# Patient Record
Sex: MALE | Race: WHITE | NOT HISPANIC OR LATINO | Employment: OTHER | URBAN - METROPOLITAN AREA
[De-identification: names, ages, dates, MRNs, and addresses within clinical notes are randomized per-mention and may not be internally consistent; named-entity substitution may affect disease eponyms.]

---

## 2024-01-03 ENCOUNTER — TRANSFERRED RECORDS (OUTPATIENT)
Dept: HEALTH INFORMATION MANAGEMENT | Facility: CLINIC | Age: 64
End: 2024-01-03

## 2024-02-05 ENCOUNTER — TRANSFERRED RECORDS (OUTPATIENT)
Dept: HEALTH INFORMATION MANAGEMENT | Facility: CLINIC | Age: 64
End: 2024-02-05

## 2024-02-13 ENCOUNTER — TRANSCRIBE ORDERS (OUTPATIENT)
Dept: OTHER | Age: 64
End: 2024-02-13

## 2024-02-13 DIAGNOSIS — D47.9 LYMPHOPROLIFERATIVE DISORDER (H): Primary | ICD-10-CM

## 2024-02-14 ENCOUNTER — PATIENT OUTREACH (OUTPATIENT)
Dept: ONCOLOGY | Facility: CLINIC | Age: 64
End: 2024-02-14
Payer: COMMERCIAL

## 2024-02-14 NOTE — PROGRESS NOTES
[facility]: Cancer Care                                                                   Hem/Onc  Referral reviewed   Adult Oncology/Hematology  Referral [[id number]]    Transcribed from faxed referral  by: Severson, Tammie, LPN on 02/14/24 0754     Referral Details    Referred By  Referred To   Rush Johnson MD   [address] [place] [address] [place] [address]   Phone: [phone]   Fax: [phone]    Diagnoses: Lymphoproliferative disorder (H)   Order: Adult Oncology/Hematology  Referral    [facility] / [facility]      Comment: Faxed referral received from Rush Johnson MD at [facility]   Phone: [phone]   Fax: [phone]        Order Questions    Question Answer   My Clinical Question Is: Lymphoproliferative disorder   Reason for Referral: Hematology   Hematology type: Malignant     ASSESSMENT      Clinical History (per Nurse review of records provided):    63 year old male patient referred as above, with skin bx and bmbx + for MF    Lives in [facility]      Records Location: [facility]   Pertinent labs -- BOOKMARKED  Pertinent pathology report(s) -- BOOKMARKED  Pertinent imaging -- BOOKMARKED  Referring provider note(s)-- BOOKMARKED    INTERVENTION(S)                                                      February 14, 2024  OUTGOING CALL to pt, no answer. Left voicemail introducing my role as nurse navigator with [facility] hematology/oncology clinics and that we have recd the referral  . Requested callback to number below (Mon - Fri 8am - 4:30pm) to speak to a  to set the consult date/time/location. Explained to pt that he will also receive a call from our scheduling intake team in the next 1-2 business days     -Referral Triage Scheduling Instructions added to Hem/Onc  referral for Patient Access rep    PLAN                                                       Hem/Onc consult at [facility]     Records Needed: YES : imaging reports and images, derm path and bmbx path slides for consult at [facility] hempath     See records team's Pre-Visit encounter documentation for additional records retrieval information.    Liyah Mccann, RN, BSN, OCN  Hematology/Oncology New Patient Nurse Navigator   [facility] [place]  [phone]

## 2024-02-16 NOTE — TELEPHONE ENCOUNTER
466  RECORDS STATUS - ALL OTHER DIAGNOSIS      Action February 16, 2024 12:20 PM    Action Taken - Request and label is faxed to [facility] Path and [facility] for slides to be sent to David at [facility].  - Request is faxed to [facility] and [facility] Film rooms to push images to  PACS.      Imaging Received  February 19, 2024 9:06 AM    Action: Images from [facility] received and resolved to PACS.     RECORDS RECEIVED FROM: [facility]   DATE RECEIVED:    NOTES STATUS DETAILS   OFFICE NOTE from referring provider [facility] 1/30/24: Dr. Rush Johnson   OFFICE NOTE from other specialist [facility] 1/3/24: BEAN Prajapati CNP   OPERATIVE REPORT [facility] 2/5/24: BMB  1/3/24: Punch Skin Bx   MEDICATION LIST [facility]    LABS     PATHOLOGY REPORTS Report in [facility]  (Slides Requested)  [facility] Fed Tracking #: [id number]  [facility] [employer] Tracking #: [id number] 2/5/24: [id number] (positive)  1/3/24: [id number] (positive)   ANYTHING RELATED TO DIAGNOSIS [facility] Most recent 1/30/24   IMAGING (NEED IMAGES & REPORT)     CT SCANS (Img req from [facility]) [facility]:  2/5/24: CT Bone Marrow Bx  2/1/24: CT Chest Abd Pel    [place]:  1/11/24: CT Head  1/11/24: CTA Chest

## 2024-02-19 PROBLEM — C84.09: Status: ACTIVE | Noted: 2024-02-19

## 2024-02-19 NOTE — PROGRESS NOTES
Virtual Visit Details    Type of service:  Video Visit     Originating Location (pt. 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  To summarize his course, he had a skin rash involving the arms that waxed an waned since about 2011.  It progressed to involve his torso.  Rash was raised, red/purple in color, and intermittently itched.  There was limited benefit from topical creams.  Skin biopsy in early 2021 suggested interface dermatitis.  Skin biopsy in 07/2021 had atypical lymphoid infiltrates but with no specific histologic diagnosis.  Skin biopsy in 01/2024 was felt to be most consistent with MF.  Bone marrow biopsy in 02/2024 had no obvious bone marrow involvement but peripheral blood flow cytometry showed about 0.5 x 10^9/L clonal T cells (Sezary cells).  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  He had no fever, night sweats, or unintentional weight loss.  He has received NBUVB treatment in the past with some improvement in itching and some relief from topical steroids but not durable.  Visit for recommendations regarding management of MF vs SS.    He is with wife Irma.  Energy level has been a bit lower - still working but not as much.  Some dyspnea since having influenza about a month ago.  Diffuse rash has been more itchy and bothersome recently.  No fevers, night sweats, or unintentional weight loss.  No headache or focal weakness or sensory changes.  Normal bowel function.  No urinary complaints.  No bleeding or unusual bruising.  No new lumps or bumps.   "Interested for recommendations regarding lymphoma.    PAST MEDICAL HISTORY:  Prothrombin gene mutation, hyperlipidemia    MEDICATIONS:  Current Outpatient Medications   Medication    clobetasol propionate (CLOBEX) 0.05 % external shampoo    pseudoePHEDrine (SUDAFED) 30 MG tablet    triamcinolone (KENALOG) 0.1 % external cream     No current facility-administered medications for this visit.     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  HEENT: Sclerae anicteric.  Lungs: Breathing comfortably. No cough.  MSK: Grossly normal movement.  Neuro: Grossly non-focal.  Skin/access: Visible skin erythematous, no visible ulceration or plaques/tumors, sparing around the eyes  Psych: Alert and oriented. No distress.  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    We reviewed his course as outlined above and the general features, natural history, and management of MF and SS.  He has pretty extensive skin involvement in the 80+% range with intermediate blood involvement (about 0.5 x 10^9/L involvement by Sezary cells) and no obvious visceral or lymph node involvement - that seems most consistent with clinical " stage IIIB MF.  We discussed that outcomes can vary widely for MF/SS and that survival for advanced stage disease has historically been in the 5-10 year range but that there is a large range with some going much better and some not as good and there are an increasing number of options for treatment.  We also discussed that standard treatments are generally not curative short of alloBMT and that continued treatment of one type or another is typically required for the best outcome.  There has been an inadequate response to skin directed therapy, I would tend to favor some type of systemic therapy with continued skin-directed treatment - possibly extracorporeal photopheresis (ECP).    I recommended a visit with our Derm Lymphoma team ASAP for their input on skin-directed treatment and consideration of ECP.  Based on the blood involvement, ECP may be a reasonable place to start if the Derm Lymphoma Team agrees to avoid the side effects and potential toxicity of other systemic treatment options.  If ECP is not favored by the Derm Lymphoma Team or not adequately effective or feasible, then systemic therapy with a systemic retinoid such as bexarotene or interferon would be reasonable choices +/- ECP.  Bexarotene may be a good choice to avoid common side effects of interferon treatment.    Once he is seen by our Derm Lymphoma Team we can discuss treatment options further including his local oncologist Dr. Rush Johnson to figure out logistics of treatment and follow-up.  I mentioned other resources our clinic can provide including Cancer Psychology, Social Work, PT, dietician, etc.  They agree with the plan.    He has no active ID issues.  I recommended Prevnar 20, the Shingrix vaccine series, a flu shot, RSV vaccine, and an updated COVID-19 vaccine if he has not received these.  He declined vaccines.    He will continue to work with his primary care clinic for health maintenance and other medical issues.  We will keep them  in the loop.    We will request a Derm Lymphoma visit ASAP and arrange additional follow-up after that visit.  I provided contact information for our clinic and asked them to contact us if questions, concerns, or new issues come up between visits.    Video start time: 12:53 PM  Video end time: 1:52 PM  Video duration: 59 minutes    Total of 85 minutes on patient visit, reviewing records, interpreting test results, placing orders, and documentation on the day of service.  Plus and additional 60 minutes reviewing records in preparation for the visit prior to date of service.    Roberto Everett MD, PhD  Attending Physician, [facility]  [phone] [facility]

## 2024-02-20 ENCOUNTER — LAB (OUTPATIENT)
Dept: LAB | Facility: CLINIC | Age: 64
End: 2024-02-20
Attending: INTERNAL MEDICINE
Payer: COMMERCIAL

## 2024-02-20 ENCOUNTER — PRE VISIT (OUTPATIENT)
Dept: ONCOLOGY | Facility: CLINIC | Age: 64
End: 2024-02-20
Payer: COMMERCIAL

## 2024-02-20 ENCOUNTER — VIRTUAL VISIT (OUTPATIENT)
Dept: ONCOLOGY | Facility: CLINIC | Age: 64
End: 2024-02-20
Attending: INTERNAL MEDICINE
Payer: COMMERCIAL

## 2024-02-20 VITALS — WEIGHT: 165 LBS | BODY MASS INDEX: 27.49 KG/M2 | HEIGHT: 65 IN

## 2024-02-20 DIAGNOSIS — C84.09 MYCOSIS FUNGOIDES INVOLVING EXTRANODAL SITE EXCLUDING SPLEEN AND OTHER SOLID ORGANS (H): Primary | ICD-10-CM

## 2024-02-20 DIAGNOSIS — D59.4 HEMOLYTIC ANEMIA ASSOCIATED WITH LYMPHOPROLIFERATIVE DISORDER (H): Primary | ICD-10-CM

## 2024-02-20 DIAGNOSIS — D47.9 HEMOLYTIC ANEMIA ASSOCIATED WITH LYMPHOPROLIFERATIVE DISORDER (H): Primary | ICD-10-CM

## 2024-02-20 PROCEDURE — 88321 CONSLTJ&REPRT SLD PREP ELSWR: CPT | Mod: GC | Performed by: PATHOLOGY

## 2024-02-20 PROCEDURE — 99205 OFFICE O/P NEW HI 60 MIN: CPT | Mod: 95 | Performed by: INTERNAL MEDICINE

## 2024-02-20 RX ORDER — TRIAMCINOLONE ACETONIDE 1 MG/G
CREAM TOPICAL
COMMUNITY

## 2024-02-20 RX ORDER — PSEUDOEPHEDRINE HCL 30 MG
30 TABLET ORAL
COMMUNITY

## 2024-02-20 RX ORDER — CLOBETASOL PROPIONATE 0.05 G/100ML
SHAMPOO TOPICAL
COMMUNITY

## 2024-02-20 NOTE — LETTER
"    2/20/2024         RE: Blane Ugalde  [address] [facility] [address]        Dear Colleague,    Thank you for referring your patient, Blane Ugalde, to the [facility]. Please see a copy of my visit note below.    Virtual Visit Details    Type of service:  Video Visit   Video Start Time: {video visit start/end time for provider to select:065661}  Video End Time:{video visit start/end time for provider to select:606723}    Originating Location (pt. Location): {video visit patient location:026921::\"Home\"}  {PROVIDER LOCATION On-site should be selected for visits conducted from your clinic location or adjoining [facility] hospital, academic office, or other nearby [facility] building. 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  To summarize his course, he had a skin rash involving the arms that waxed an waned since about 2011.  It progressed to involve his torso.  Rash was raised, red/purple in color, and intermittently itched.  There was limited benefit from topical creams.  Skin biopsy in early 2021 suggested interface dermatitis.  Skin biopsy in 07/2021 had atypical lymphoid infiltrates but with no specific histologic diagnosis.  Skin biopsy in 01/2024 was felt to be most consistent with MF.  Bone marrow biopsy in 02/2024 had no obvious bone marrow involvement but peripheral blood flow cytometry showed about 0.5 x " 10^9/L clonal T cells.  Overall, consistent with MF - clinical stage IIIB - with blood involvement approaching clinical stage IVA1 SS.  He had no fever, night sweats, or unintentional weight loss.  He has received NBUVB treatment with some improvement in itching.  Visit for recommendations regarding management of MF vs SS.    He is with ***.  Energy level is OK.  Diffuse rash has been more itchy and bothersome recently.  No fevers, night sweats, or unintentional weight loss.  No headache or focal weakness or sensory changes.  No dyspnea, cough, or chest pain.  Normal bowel function.  No urinary complaints.  No bleeding or unusual bruising.  No new lumps or bumps.  ***    PAST MEDICAL HISTORY:  Prothrombin gene mutation, hyperlipidemia    MEDICATIONS:  No current outpatient medications on file.     No current facility-administered medications for this visit.     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  Wt Readings from Last 5 Encounters:   No data found for Wt     General: Well appearing.  HEENT: Sclerae anicteric.  Lungs: Breathing comfortably. No cough.  MSK: Grossly normal movement.  Neuro: Grossly non-focal.  Skin/access: ***  Psych: Alert and oriented. No distress.  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    We reviewed his course as outlined above and the general features, natural history, and management of MF and SS.  He has pretty extensive skin involvement in the 80+% range with intermediate blood involvement (about 0.5 x 10^9/L involvement by Sezary cells) and no obvious visceral or lymph node involvement - that seems most consistent with clinical stage IIIB MF.  We discussed that outcomes can vary widely for MF/SS and that survival for advanced stage disease has historically been in the 5-10 year range but that there is a large range with some going much better and some not as good and there are an increasing number of options for treatment.  We also discussed that standard treatments are generally not curative short of alloBMT and that continued treatment of one type or another is typically required for the best outcome.  There has been an inadequate response to skin directed therapy, I would tend to favor some type of systemic therapy with continued skin-directed treatment - possibly extracorporeal photopheresis (ECP).    I would recommend a visit with our Derm Lymphoma team ASAP for their input on skin-directed treatment and consideration of ECP.  Based on the blood involvement ECP may be a reasonable place to start if the Derm Lymphoma Team agrees to avoid the side effects and potential toxicity of other systemic treatment options.  If ECP is not favored by the Derm Lymphoma Team or not adequately effective or feasible, then systemic therapy with a systemic retinoid such as bexarotene or interferon would be reasonable choices +/- ECP.  Bexarotene may be a good choice to avoid common side effects of interferon treatment.    Once he is seen by our Derm Lymphoma Team we can discuss treatment options further including his local oncologist Dr. Rush Johnson to figure out logistics of treatment and follow-up.  He agrees with the plan.    He has no active ID issues.  I recommended Prevnar 20, the Shingrix  vaccine series, a flu shot, RSV vaccine, and an updated COVID-19 vaccine if he has not received these.    He will continue to work with his primary care clinic for health maintenance and other medical issues.  We will keep them in the loop.    We will request a Derm Lymphoma visit ASA and arrange additional follow-up after that visit.  I provided contact information for our clinic and asked them to contact us if questions, concerns, or new issues come up between visits.    Video start time: ***  Video end time: ***  Video duration: *** minutes    Total of *** minutes on patient visit, reviewing records, interpreting test results, placing orders, and documentation on the day of service.  Plus and additional 60 minutes reviewing records in preparation for the visit prior to date of service.        Derm Lymphoma visit as scheduled  Further follow-up will be determined after that visit                      Virtual Visit Details    Type of service:  Video Visit     Originating Location (pt. Location): Home  {PROVIDER LOCATION On-site should be selected for visits conducted from your clinic location or adjoining [facility] hospital, academic office, or other nearby [facility] building. 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  To summarize his course, he had a skin rash involving the arms that waxed an waned since about 2011.  It progressed to involve his torso.  Rash was raised, red/purple in color, and intermittently itched.  There was limited benefit from topical creams.  Skin biopsy in  "early 2021 suggested interface dermatitis.  Skin biopsy in 07/2021 had atypical lymphoid infiltrates but with no specific histologic diagnosis.  Skin biopsy in 01/2024 was felt to be most consistent with MF.  Bone marrow biopsy in 02/2024 had no obvious bone marrow involvement but peripheral blood flow cytometry showed about 0.5 x 10^9/L clonal T cells (Sezary cells).  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  He had no fever, night sweats, or unintentional weight loss.  He has received NBUVB treatment in the past with some improvement in itching and some relief from topical steroids but not durable.  Visit for recommendations regarding management of MF vs SS.    He is with wife Irma.  Energy level has been a bit lower - still working but not as much.  Some dyspnea since having influenza about a month ago.  Diffuse rash has been more itchy and bothersome recently.  No fevers, night sweats, or unintentional weight loss.  No headache or focal weakness or sensory changes.  Normal bowel function.  No urinary complaints.  No bleeding or unusual bruising.  No new lumps or bumps.  Interested for recommendations regarding lymphoma.    PAST MEDICAL HISTORY:  Prothrombin gene mutation, hyperlipidemia    MEDICATIONS:  Current Outpatient Medications   Medication    clobetasol propionate (CLOBEX) 0.05 % external shampoo    pseudoePHEDrine (SUDAFED) 30 MG tablet    triamcinolone (KENALOG) 0.1 % external cream     No current facility-administered medications for this visit.     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  HEENT: Sclerae anicteric.  Lungs: Breathing comfortably. No cough.  MSK: Grossly normal movement.  Neuro: Grossly non-focal.  Skin/access: Visible skin erythematous, no visible ulceration or plaques/tumors, sparing around the eyes  Psych: Alert and oriented. No distress.  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    We reviewed his course as outlined above and the general features, natural history, and management of MF and SS.  He has pretty extensive skin involvement in the 80+% range with intermediate blood involvement (about 0.5 x 10^9/L involvement by Sezary cells) and no obvious visceral or lymph node involvement - that seems most consistent with clinical stage IIIB MF.  We discussed that outcomes can vary widely for MF/SS and that survival for advanced stage disease has historically been in the 5-10 year range but that there is a large range with some going much better and some not as good and there are an increasing number of options for treatment.  We also discussed that standard treatments are generally not curative short of alloBMT and that continued treatment of one type or another is typically required for the best outcome.  There has been an inadequate response to skin directed therapy, I would tend to favor some type of systemic therapy with continued skin-directed treatment - possibly extracorporeal photopheresis (ECP).    I recommended a visit with our Derm Lymphoma team ASAP for their input on  skin-directed treatment and consideration of ECP.  Based on the blood involvement, ECP may be a reasonable place to start if the Derm Lymphoma Team agrees to avoid the side effects and potential toxicity of other systemic treatment options.  If ECP is not favored by the Derm Lymphoma Team or not adequately effective or feasible, then systemic therapy with a systemic retinoid such as bexarotene or interferon would be reasonable choices +/- ECP.  Bexarotene may be a good choice to avoid common side effects of interferon treatment.    Once he is seen by our Derm Lymphoma Team we can discuss treatment options further including his local oncologist Dr. Rush Johnson to figure out logistics of treatment and follow-up.  I mentioned other resources our clinic can provide including Cancer Psychology, Social Work, PT, dietician, etc.  They agree with the plan.    He has no active ID issues.  I recommended Prevnar 20, the Shingrix vaccine series, a flu shot, RSV vaccine, and an updated COVID-19 vaccine if he has not received these.  He declined vaccines.    He will continue to work with his primary care clinic for health maintenance and other medical issues.  We will keep them in the loop.    We will request a Derm Lymphoma visit ASAP and arrange additional follow-up after that visit.  I provided contact information for our clinic and asked them to contact us if questions, concerns, or new issues come up between visits.    Video start time: 12:53 PM  Video end time: 1:52 PM  Video duration: 59 minutes    Total of *** minutes on patient visit, reviewing records, interpreting test results, placing orders, and documentation on the day of service.  Plus and additional 60 minutes reviewing records in preparation for the visit prior to date of service.        Derm Lymphoma visit as scheduled  Further follow-up will be determined after that visit                       Again, thank you for allowing me to participate in the care of your  patient.        Sincerely,        Roberto Everett MD

## 2024-02-20 NOTE — NURSING NOTE
Is the patient currently in the state of [place]? YES    Visit mode:VIDEO    If the visit is dropped, the patient can be reconnected by: TELEPHONE VISIT: Phone number: [phone]    Will anyone else be joining the visit? Yes,spouse Irma is there with.   (If patient encounters technical issues they should call [phone])    How would you like to obtain your AVS? MyChart    Are changes needed to the allergy or medication list? Yes Omeprazole 10mg as needed     Reason for visit: Consult    Stormy HARVEY

## 2024-02-20 NOTE — LETTER
2/20/2024       RE: Blane Ugalde  [address] [facility] [address]     Dear Colleague,    Thank you for referring your patient, Blane Ugalde, to the [facility]. Please see a copy of my visit note below.    Virtual Visit Details    Type of service:  Video Visit     Originating Location (pt. 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  To summarize his course, he had a skin rash involving the arms that waxed an waned since about 2011.  It progressed to involve his torso.  Rash was raised, red/purple in color, and intermittently itched.  There was limited benefit from topical creams.  Skin biopsy in early 2021 suggested interface dermatitis.  Skin biopsy in 07/2021 had atypical lymphoid infiltrates but with no specific histologic diagnosis.  Skin biopsy in 01/2024 was felt to be most consistent with MF.  Bone marrow biopsy in 02/2024 had no obvious bone marrow involvement but peripheral blood flow cytometry showed about 0.5 x 10^9/L clonal T cells (Sezary cells).  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  He had no fever, night sweats, or unintentional weight loss.  He has received NBUVB treatment in the past with some improvement in itching and some relief from topical steroids but not durable.  Visit for recommendations regarding management of MF vs SS.    He is with wife Irma.  Energy level has been a bit lower - still working but not as much.  Some dyspnea since having influenza about a month ago.  Diffuse rash has been more itchy and  "bothersome recently.  No fevers, night sweats, or unintentional weight loss.  No headache or focal weakness or sensory changes.  Normal bowel function.  No urinary complaints.  No bleeding or unusual bruising.  No new lumps or bumps.  Interested for recommendations regarding lymphoma.    PAST MEDICAL HISTORY:  Prothrombin gene mutation, hyperlipidemia    MEDICATIONS:  Current Outpatient Medications   Medication     clobetasol propionate (CLOBEX) 0.05 % external shampoo     pseudoePHEDrine (SUDAFED) 30 MG tablet     triamcinolone (KENALOG) 0.1 % external cream     No current facility-administered medications for this visit.     FAMILY HISTORY:  Family history of pancreatic and stomach cancer, no known blood cancers, several men with blood clots on father's side    SOCIAL HISTORY:  never smoker, , 3 kids, contractor, lives in [place], [place]    PHYSICAL EXAMINATION:  Ht 1.651 m (5' 5\")   Wt 74.8 kg (165 lb)   BMI 27.46 kg/m    Wt Readings from Last 5 Encounters:   02/20/24 74.8 kg (165 lb)     General: Well appearing.  HEENT: Sclerae anicteric.  Lungs: Breathing comfortably. No cough.  MSK: Grossly normal movement.  Neuro: Grossly non-focal.  Skin/access: Visible skin erythematous, no visible ulceration or plaques/tumors, sparing around the eyes  Psych: Alert and oriented. No distress.  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    We reviewed his course as outlined above and the general features, natural history, and management of MF and SS.  " He has pretty extensive skin involvement in the 80+% range with intermediate blood involvement (about 0.5 x 10^9/L involvement by Sezary cells) and no obvious visceral or lymph node involvement - that seems most consistent with clinical stage IIIB MF.  We discussed that outcomes can vary widely for MF/SS and that survival for advanced stage disease has historically been in the 5-10 year range but that there is a large range with some going much better and some not as good and there are an increasing number of options for treatment.  We also discussed that standard treatments are generally not curative short of alloBMT and that continued treatment of one type or another is typically required for the best outcome.  There has been an inadequate response to skin directed therapy, I would tend to favor some type of systemic therapy with continued skin-directed treatment - possibly extracorporeal photopheresis (ECP).    I recommended a visit with our Derm Lymphoma team ASAP for their input on skin-directed treatment and consideration of ECP.  Based on the blood involvement, ECP may be a reasonable place to start if the Derm Lymphoma Team agrees to avoid the side effects and potential toxicity of other systemic treatment options.  If ECP is not favored by the Derm Lymphoma Team or not adequately effective or feasible, then systemic therapy with a systemic retinoid such as bexarotene or interferon would be reasonable choices +/- ECP.  Bexarotene may be a good choice to avoid common side effects of interferon treatment.    Once he is seen by our Derm Lymphoma Team we can discuss treatment options further including his local oncologist Dr. Rush Johnson to figure out logistics of treatment and follow-up.  I mentioned other resources our clinic can provide including Cancer Psychology, Social Work, PT, dietician, etc.  They agree with the plan.    He has no active ID issues.  I recommended Prevnar 20, the Shingrix vaccine  series, a flu shot, RSV vaccine, and an updated COVID-19 vaccine if he has not received these.  He declined vaccines.    He will continue to work with his primary care clinic for health maintenance and other medical issues.  We will keep them in the loop.    We will request a Derm Lymphoma visit ASA and arrange additional follow-up after that visit.  I provided contact information for our clinic and asked them to contact us if questions, concerns, or new issues come up between visits.    Video start time: 12:53 PM  Video end time: 1:52 PM  Video duration: 59 minutes    Total of 85 minutes on patient visit, reviewing records, interpreting test results, placing orders, and documentation on the day of service.  Plus and additional 60 minutes reviewing records in preparation for the visit prior to date of service.    Roberto Everett MD, PhD  Attending Physician, [facility]  [phone] clinic      Again, thank you for allowing me to participate in the care of your patient.      Sincerely,    Roberto Everett MD

## 2024-02-22 ENCOUNTER — PATIENT OUTREACH (OUTPATIENT)
Dept: ONCOLOGY | Facility: CLINIC | Age: 64
End: 2024-02-22
Payer: COMMERCIAL

## 2024-02-22 LAB
PATH REPORT.COMMENTS IMP SPEC: ABNORMAL
PATH REPORT.COMMENTS IMP SPEC: YES
PATH REPORT.FINAL DX SPEC: ABNORMAL
PATH REPORT.GROSS SPEC: ABNORMAL
PATH REPORT.MICROSCOPIC SPEC OTHER STN: ABNORMAL
PATH REPORT.RELEVANT HX SPEC: ABNORMAL
PATH REPORT.RELEVANT HX SPEC: ABNORMAL
PATH REPORT.SITE OF ORIGIN SPEC: ABNORMAL

## 2024-02-22 NOTE — PROGRESS NOTES
[facility]: Cancer Care                                                                                          New pt to clinic on Tuesday.  Chart reviewed and enrollment status updated.  My chart message sent to pt to introduce self and role as RNCC.  Contact information for clinic provided.  Pt has appropriate follow up scheduled.    JACQUELINE Valderrama, RN  RN Care Coordinator  [facility]

## 2024-02-27 ENCOUNTER — OFFICE VISIT (OUTPATIENT)
Dept: DERMATOLOGY | Facility: CLINIC | Age: 64
End: 2024-02-27
Payer: COMMERCIAL

## 2024-02-27 DIAGNOSIS — C84.09 MYCOSIS FUNGOIDES INVOLVING EXTRANODAL SITE EXCLUDING SPLEEN AND OTHER SOLID ORGANS (H): ICD-10-CM

## 2024-02-27 DIAGNOSIS — Z79.899 ENCOUNTER FOR LONG-TERM (CURRENT) USE OF HIGH-RISK MEDICATION: Primary | ICD-10-CM

## 2024-02-27 PROCEDURE — 99205 OFFICE O/P NEW HI 60 MIN: CPT | Performed by: STUDENT IN AN ORGANIZED HEALTH CARE EDUCATION/TRAINING PROGRAM

## 2024-02-27 RX ORDER — OMEPRAZOLE 10 MG/1
20 CAPSULE, DELAYED RELEASE ORAL DAILY
COMMUNITY

## 2024-02-27 RX ORDER — METHOTREXATE 2.5 MG/1
25 TABLET ORAL
Qty: 40 TABLET | Refills: 3 | Status: SHIPPED | OUTPATIENT
Start: 2024-02-27 | End: 2024-06-24

## 2024-02-27 RX ORDER — FOLIC ACID 1 MG/1
1 TABLET ORAL DAILY
Qty: 26 TABLET | Refills: 11 | Status: SHIPPED | OUTPATIENT
Start: 2024-02-27

## 2024-02-27 NOTE — NURSING NOTE
Dermatology Rooming Note    Blane Ugalde's goals for this visit include:   Chief Complaint   Patient presents with    Autoimmune Disease     CTCL; discoloration and rashes all over body. Pt also states has constant pruritus.       Anthony Cano, EMEKA-B

## 2024-02-27 NOTE — PROGRESS NOTES
[facility] Dermatology Note    Encounter Date: Feb 27, 2024    Dermatology Problem List:  #MF stage IIIB  -Erythrodermic, flow with 500 Sezary cells, CT scan with 9 mm right axillary lymph node    CT scan 02/01/24  IMPRESSION:   1. Prominent, rounded but not pathologically enlarged RIGHT axillary lymph nodes indeterminate for lymphomatous involvement.   2.  No additional pathologically enlarged lymph nodes within the chest, abdomen or pelvis.   3.  Hepatic steatosis.   4.  Multiple hepatic cysts with additional hypodensities, too small to characterize.   5.  Bilateral renal cyst with additional hypodensities, too small to characterize.   6.  3-4 mm RIGHT lung pulmonary nodules, indeterminate.   7.  Small hiatal hernia.   8.  Enlarged main pulmonary artery can be seen with pulmonary arterial hypertension.   9.  Enlarged, heterogenous prostate.     -Treatment considerations include distance traveled, 300 miles from the [place]  - 02/27/2024 discussed treatment options plan to proceed with ECP in agreement with oncologist, start methotrexate 25 mg weekly with folic acid, assess response in 3 months keeping in mind that ECP has maximum results sometimes up to 6 months.  Continue home light boost therapy, not titrating up dose recommended finding the instruction manual for his light unit and titrating up as directed. Start bleach baths    ______________________________________    Impression/Plan:  Blane was seen today for autoimmune disease.    Diagnoses and all orders for this visit:    Encounter for long-term (current) use of high-risk medication  -     CBC with platelets differential; Standing  -     Comprehensive metabolic panel; Standing    Mycosis fungoides involving extranodal site excluding spleen and other solid organs (H) (chronic illness that poses risk to life, medication requiring intensive monitoring for toxicity)  -     Adult Dermatology  Referral  -     methotrexate 2.5 MG  tablet; Take 10 tablets (25 mg) by mouth every 7 days . Take the same day of the week.  -     folic acid (FOLVITE) 1 MG tablet; Take 1 tablet (1 mg) by mouth daily , except on days that you take methotrexate  -     Photopheresis Referral  -Recently met with oncologist  we discussed the variable disease course, difficult to predict prognosis, and the various treatment options available at his stage of disease  - Agree that extracorporeal photopheresis would be an excellent choice given his extensive skin involvement, mild blood involvement, and its low to minimal side effect profile.  The only issue with this treatment is that he lives about 300 miles away and treatments would at the very least starting out to be once monthly for 2 consecutive days.  Discussing this with him, his wife, and his son in the room they think that this is definitely doable and they are interested in proceeding with this treatment  - In addition methotrexate would be a good option given its relatively low side effect proper profile, good response rate and a wide range of disease stages, start 25 mg weekly, given instructions on lab monitoring frequency and use of folic acid  -Recheck in 3 months, would repeat flow cytometry at that time as well, if not improved we can consider switching to bexarotene, adding interferon, or referral for brentuximab  --Start bleach baths to help lower levels of any colonizing staph bacteria which may be present.  Exotoxins produced by staph bacteria have been shown experimentally to directly stimulate neoplastic T cells as well as stimulate nonneoplastic T cells to release growth signals which are consumed/used by the neoplastic T cells    Other orders  -     Adult Dermatology Clinic Follow-Up Order (Blank); Future        Follow-up in 3 mo .       Staff Involved:  Staff Only    Ezra Rico MD   of Dermatology  Department of Dermatology  [facility]   Medicine      CC:   Chief Complaint   Patient presents with    Autoimmune Disease     CTCL; discoloration and rashes all over body. Pt also states has constant pruritus.         History of Present Illness:  Mr. Blane Ugalde is a 63 year old male who presents as a new patient.    Pt presents today for evaluation of lonstanding waxing and waning pruritic rash ongoing for >10 years. Treated initially as eczema but was unresponsive to topical steroids. Has had multiple biopsies some in 2021 showing atypical lymphoid infiltrates but without definitive features of CTCL> RE biopsy more recently in January was c/w MF. Work up including BM bx, and flow cytometry showed no BM involvement but with 500 sezary cells. He is erythrodermic putting him at stage IIIB.     He recently had a virtual visit with Dr. Everett in Oncology on 02/20. They had an extensive discussion on the typical course of the disease and treatment options including ECP     Is doing nbUVB 3x weekly,.     Labs:      Physical exam:  Vitals: There were no vitals taken for this visit.  GEN: well developed, well-nourished, in no acute distress, in a pleasant mood.     SKIN: Beasley phototype 1  - Full skin, which includes the head/face, both arms, chest, back, abdomen,both legs, genitalia and/or groin buttocks, digits and/or nails, was examined.  -Around 80% body surface area with erythematous desquamating patches  - No other lesions of concern on areas examined.     Past Medical History:   History reviewed. No pertinent past medical history.  History reviewed. No pertinent surgical history.    Social History:   reports that he has never smoked. He has never used smokeless tobacco.    Family History:  History reviewed. No pertinent family history.    Medications:  Current Outpatient Medications   Medication Sig Dispense Refill    folic acid (FOLVITE) 1 MG tablet Take 1 tablet (1 mg) by mouth daily , except on days that you take methotrexate 26 tablet 11     methotrexate 2.5 MG tablet Take 10 tablets (25 mg) by mouth every 7 days . Take the same day of the week. 40 tablet 3    omeprazole (PRILOSEC) 10 MG DR capsule Take 20 mg by mouth daily      pseudoePHEDrine (SUDAFED) 30 MG tablet Take 30 mg by mouth      triamcinolone (KENALOG) 0.1 % external cream APPLY TO AFFECTED AREA 2 TIMES A DAY FOR 10 DAYS TO ARMS, CHEST, NECK, AND BACK      clobetasol propionate (CLOBEX) 0.05 % external shampoo APPLY TOPICALLY 3 TIMES A WEEK LEAVE IN SCALP FOR 10 MINUTES AND RINSE OUT (Patient not taking: Reported on 2/27/2024)       No Known Allergies

## 2024-02-27 NOTE — PATIENT INSTRUCTIONS
See below for instructions of your new regimen:     1.  Take 10 pills of methotrexate weekly, to the best of your ability you should take these on the same day.  You can occasionally get some mild nausea or loose stools after taking the medication if this were to happen and you found it intolerable you could split up your dose between 2 days instead of 1 day out of the week.   2.  Take 1 mg of folic acid which can taken by pill, obtained over the counter, or by prescription in a liquid  3.  You will need regular lab monitoring to check your blood counts and metabolic panel to make sure that the medication is not having any adverse effects.  Initially the interval for these lab checks will be biweekly, and eventually it would be as far spaced out as every 3 months.  We will always want to draw labs 6 days after your last dose of methotrexate, or said another way, the day before your next dose     Here is what your schedule look like during the fused first few weeks of this regimen:     Start methotrexate on day 0, redose weekly   Start folate 1 mg pills (or liquid) daily on day 1, hold folate on the day(s) that you take methotrexate   Take your second dose of methotrexate on day 7   The day before your third dose on day 13, have labs checked   Take your third dose of methotrexate on day 14   We will then recheck labs another 2 weeks after that dose   If those labs are normal we will check labs again a month from the second lab draw   If those labs are normal we will check labs again 3 months from the last lab draw     Methotrexate works well, it does work slowly we should give it at least 6 to 8 weeks for it to hit it's stride and see if it is working.  If it does not work satisfactorily, then we can look at other options. We advise you not to drink more than 2 alcohol beverages per week and to not get pregnant    I know this is a lot of information please message me or request a phone call and we can talk more if  "there is anything that I need to explain more clearly      Best,     EM     Dilute Bleach Bath Instructions     What are dilute bleach baths?  Dilute bleach baths are used to help fight bacteria that is commonly found on the skin; this bacteria may be preventing your skin from healing. If is also used to calm inflammation in skin, even if infection is not present. The dilution ratio we recommend is the same concentration that is in a swimming pool. This technique is safe and can help prevent your infant or child from needing oral antibiotics for basic staph bacteria on the skin.       Type of bleach:  - Regular, plain, household bleach used for cleaning clothing. Brand or Generic is okay.   - Make sure this is plain or concentrated bleach. The bleach bottle should not contain any of the following words \"pour safe, with fabric protection, with cloromax technology, splash free, splash less, gentle or color safe.\"   - There should not be any added fragrance to the bleach; such a lavender.     How do I make a dilute bleach bath?  - Pour 1/3 of concentrated bleach or 1/2 cup of plain of bleach into an adult size bath tub of 4-6 inches of lukewarm water.  - For smaller tubs (infant size tubs), add two tablespoons of bleach to the tub water.   - You can even add a tablespoon to a spray bottle and spray on the mixture in the shower before washing it off  - Bleach baths work better if you are able to submerge most of the  - Repeat bleach baths as recommended by your provider.     Other information:  - Do not pour bleach directly onto the skin.  - If is safe to get the bleach mixture on your face and scalp.  - Do not drink the bleach mixture.  - Keep bleach bottle out of reach of children        You can also try CLn wash which has dilute bleach in it                 "

## 2024-02-27 NOTE — LETTER
2/27/2024       RE: Blane Ugalde  [address]  [place] [facility] [address]     Dear Colleague,    Thank you for referring your patient, Blane Ugalde, to the [facility] DERMATOLOGY CLINIC [place] at [facility]. Please see a copy of my visit note below.    Dermatology Rooming Note    Blane Ugalde's goals for this visit include:   Chief Complaint   Patient presents with    Autoimmune Disease     CTCL; discoloration and rashes all over body. Pt also states has constant pruritus.       Anthony Cano, EMT-B      [facility] Dermatology Note    Encounter Date: Feb 27, 2024    Dermatology Problem List:  #MF stage IIIB  -Erythrodermic, flow with 500 Sezary cells, CT scan with 9 mm right axillary lymph node    CT scan 02/01/24  IMPRESSION:   1. Prominent, rounded but not pathologically enlarged RIGHT axillary lymph nodes indeterminate for lymphomatous involvement.   2.  No additional pathologically enlarged lymph nodes within the chest, abdomen or pelvis.   3.  Hepatic steatosis.   4.  Multiple hepatic cysts with additional hypodensities, too small to characterize.   5.  Bilateral renal cyst with additional hypodensities, too small to characterize.   6.  3-4 mm RIGHT lung pulmonary nodules, indeterminate.   7.  Small hiatal hernia.   8.  Enlarged main pulmonary artery can be seen with pulmonary arterial hypertension.   9.  Enlarged, heterogenous prostate.     -Treatment considerations include distance traveled, 300 miles from the [place]  - 02/27/2024 discussed treatment options plan to proceed with ECP in agreement with oncologist, start methotrexate 25 mg weekly with folic acid, assess response in 3 months keeping in mind that ECP has maximum results sometimes up to 6 months.  Continue home light boost therapy, not titrating up dose recommended finding the instruction manual for his light unit and titrating up as directed. Start bleach  jacqueline    ______________________________________    Impression/Plan:  Blane was seen today for autoimmune disease.    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 Take the same day of the week.  -     folic acid (FOLVITE) 1 MG tablet; Take 1 tablet (1 mg) by mouth daily , except on days that you take methotrexate  -     Photopheresis Referral  -Recently met with oncologist  we discussed the variable disease course, difficult to predict prognosis, and the various treatment options available at his stage of disease  - Agree that extracorporeal photopheresis would be an excellent choice given his extensive skin involvement, mild blood involvement, and its low to minimal side effect profile.  The only issue with this treatment is that he lives about 300 miles away and treatments would at the very least starting out to be once monthly for 2 consecutive days.  Discussing this with him, his wife, and his son in the room they think that this is definitely doable and they are interested in proceeding with this treatment  - In addition methotrexate would be a good option given its relatively low side effect proper profile, good response rate and a wide range of disease stages, start 25 mg weekly, given instructions on lab monitoring frequency and use of folic acid  -Recheck in 3 months, would repeat flow cytometry at that time as well, if not improved we can consider switching to bexarotene, adding interferon, or referral for brentuximab  --Start bleach  baths to help lower levels of any colonizing staph bacteria which may be present.  Exotoxins produced by staph bacteria have been shown experimentally to directly stimulate neoplastic T cells as well as stimulate nonneoplastic T cells to release growth signals which are consumed/used by the neoplastic T cells    Other orders  -     Adult Dermatology Clinic Follow-Up Order (Blank); Future        Follow-up in 3 mo .       Staff Involved:  Staff Only    Ezra Rico MD   of Dermatology  Department of Dermatology  [facility]      CC:   Chief Complaint   Patient presents with    Autoimmune Disease     CTCL; discoloration and rashes all over body. Pt also states has constant pruritus.         History of Present Illness:  Mr. Blane Ugalde is a 63 year old male who presents as a new patient.    Pt presents today for evaluation of lonstanding waxing and waning pruritic rash ongoing for >10 years. Treated initially as eczema but was unresponsive to topical steroids. Has had multiple biopsies some in 2021 showing atypical lymphoid infiltrates but without definitive features of CTCL> RE biopsy more recently in January was c/w MF. Work up including BM bx, and flow cytometry showed no BM involvement but with 500 sezary cells. He is erythrodermic putting him at stage IIIB.     He recently had a virtual visit with Dr. Everett in Oncology on 02/20. They had an extensive discussion on the typical course of the disease and treatment options including ECP     Is doing nbUVB 3x weekly,.     Labs:      Physical exam:  Vitals: There were no vitals taken for this visit.  GEN: well developed, well-nourished, in no acute distress, in a pleasant mood.     SKIN: Beasley phototype 1  - Full skin, which includes the head/face, both arms, chest, back, abdomen,both legs, genitalia and/or groin buttocks, digits and/or nails, was examined.  -Around 80% body surface area with erythematous  desquamating patches  - No other lesions of concern on areas examined.     Past Medical History:   History reviewed. No pertinent past medical history.  History reviewed. No pertinent surgical history.    Social History:   reports that he has never smoked. He has never used smokeless tobacco.    Family History:  History reviewed. No pertinent family history.    Medications:  Current Outpatient Medications   Medication Sig Dispense Refill    folic acid (FOLVITE) 1 MG tablet Take 1 tablet (1 mg) by mouth daily , except on days that you take methotrexate 26 tablet 11    methotrexate 2.5 MG tablet Take 10 tablets (25 mg) by mouth every 7 days . Take the same day of the week. 40 tablet 3    omeprazole (PRILOSEC) 10 MG DR capsule Take 20 mg by mouth daily      pseudoePHEDrine (SUDAFED) 30 MG tablet Take 30 mg by mouth      triamcinolone (KENALOG) 0.1 % external cream APPLY TO AFFECTED AREA 2 TIMES A DAY FOR 10 DAYS TO ARMS, CHEST, NECK, AND BACK      clobetasol propionate (CLOBEX) 0.05 % external shampoo APPLY TOPICALLY 3 TIMES A WEEK LEAVE IN SCALP FOR 10 MINUTES AND RINSE OUT (Patient not taking: Reported on 2/27/2024)       No Known Allergies

## 2024-02-28 ENCOUNTER — HOSPITAL ENCOUNTER (OUTPATIENT)
Dept: LAB | Facility: CLINIC | Age: 64
Discharge: HOME | End: 2024-02-28
Attending: STUDENT IN AN ORGANIZED HEALTH CARE EDUCATION/TRAINING PROGRAM | Admitting: STUDENT IN AN ORGANIZED HEALTH CARE EDUCATION/TRAINING PROGRAM
Payer: COMMERCIAL

## 2024-02-28 ENCOUNTER — TELEPHONE (OUTPATIENT)
Dept: DERMATOLOGY | Facility: CLINIC | Age: 64
End: 2024-02-28
Payer: COMMERCIAL

## 2024-02-28 VITALS
RESPIRATION RATE: 20 BRPM | WEIGHT: 173.72 LBS | BODY MASS INDEX: 28.94 KG/M2 | HEIGHT: 65 IN | HEART RATE: 72 BPM | DIASTOLIC BLOOD PRESSURE: 60 MMHG | SYSTOLIC BLOOD PRESSURE: 101 MMHG | TEMPERATURE: 97.8 F

## 2024-02-28 PROCEDURE — G0463 HOSPITAL OUTPT CLINIC VISIT: HCPCS

## 2024-02-28 NOTE — TELEPHONE ENCOUNTER
[facility] Call Center    Phone Message    May a detailed message be left on voicemail: yes     Reason for Call: Other: Please place order for ECP treatment. So it can submitted to his insurance. Thank you/Please call pt or spouse when complete     Action Taken: Message routed to:  [facility] DERM    Travel Screening: Not Applicable      Thank you!  [facility]

## 2024-02-28 NOTE — CONSULTS
"APHERESIS INITIAL CONSULT CHECKLIST    Current Encounter Information  Current Encounter Information: Reason for Visit, Allergies and Current Meds  Procedure Requested: Photopheresis  History of: (Reason for Apheresis): CTCL    Access Assessment  Access Assessment  Vein Assessment:  Veins are adequate: Yes (LR/BETH)  Needs a catheter placed for Apheresis?: No    Vital Signs  Vital Signs  BP: 101/60  Pulse: 72  Temp: 97.8  F (36.6  C)  Temp src: Oral  Resp: 20  Height: 165.1 cm (5' 5\")  Weight: 78.8 kg (173 lb 11.6 oz)    Reviewed   Review With Patient  Have you read the brochure Getting ready for Apheresis?: Yes  Have you had any invasive procedures, surgery, biopsy, bleeding in the last month?: Yes (bone marrow biopsy 2/5/24)  Transfusion medicine physician informed: Yes  Review medications and allergies: Yes  Have you ever been transfused?: No  Patient given tour of the unit: Yes  Photophoresis: sun precautions reviewed with patient: Yes    Additional Information  Notes, needs and time spent with patient  Explain procedure, side effects or reactions, instructions: Yes  Patient has special need?: No  Time spent: 30 minutes face to face time assessing pt. Reviewed low fat diet to follow before each procedure. Lorena Hill RN        "

## 2024-02-28 NOTE — CONSULTS
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 The patient has adequate veins, therefore central line will not be necessary for venous access at this time.      The plan is to perform 2 procedures per month on two consecutive days.      - ACD-A will be used for anticoagulation of the apheresis equipment with calcium gluconate given throughout to offset the effects.         Chief Complaint:   Transfusion medicine consultation.         History of Present Illness:   Blane Ugalde is a 63 year old male who is seen for consultation for extracorporeal photopheresis. In addition to cutaneous T-cell lymphoma (CTCL)/Sezary syndrome, his past medical history includes hyperlipidemia and he is known to have prothrombin gene mutation. He has been followed for rashes and cutaneous symptoms since 2011, but has not had a definitive diagnosis and staging for CTCL or Sezary until recently with skin biopsy, blood and bone marrow work-up in early 2024. He is currently well. The patient denies any back pain that would prevent him from tolerating the procedure and he has no allergies to latex. The procedure, risks/benefits were discussed with the patient and his wife and all of their questions were addressed at this time. Informed consent was obtained for the procedure.            Past Medical History:   No past medical history on file. (See HPI above)          Past Surgical History:   Left shoulder arthroplasty           Social History:     Social History     Tobacco Use    Smoking status: Never    Smokeless tobacco: Never   Substance Use Topics    Alcohol use:  "Not on file             Family History:   Family history of pancreatic and stomach cancer, no known blood cancers, several men with blood clots on father's side           Allergies:   No Known Allergies          Medications:     Current Outpatient Medications   Medication Sig    clobetasol propionate (CLOBEX) 0.05 % external shampoo     folic acid (FOLVITE) 1 MG tablet Take 1 tablet (1 mg) by mouth daily , except on days that you take methotrexate    methotrexate 2.5 MG tablet Take 10 tablets (25 mg) by mouth every 7 days . Take the same day of the week.    omeprazole (PRILOSEC) 10 MG DR capsule Take 20 mg by mouth daily    pseudoePHEDrine (SUDAFED) 30 MG tablet Take 30 mg by mouth    triamcinolone (KENALOG) 0.1 % external cream APPLY TO AFFECTED AREA 2 TIMES A DAY FOR 10 DAYS TO ARMS, CHEST, NECK, AND BACK     No current facility-administered medications for this encounter.             Review of Systems:     CONSTITUTIONAL:  negative for  fevers, chills, and sweats  RESPIRATORY:  negative for  dry cough, cough with sputum, and wheezing  CARDIOVASCULAR:  positive for  dyspnea on exertion  GASTROINTESTINAL:  negative for nausea, vomiting, change in bowel habits, diarrhea, and constipation  INTEGUMENT/BREAST:  positive for rash, dryness, and pruritus  HEMATOLOGIC/LYMPHATIC:  negative for easy bruising, bleeding, and petechiae  NEUROLOGICAL:  negative for headaches, seizures, and numbness           Vital Signs:   /60   Pulse 72   Temp 97.8  F (36.6  C) (Oral)   Resp 20   Ht 1.651 m (5' 5\")   Wt 78.8 kg (173 lb 11.6 oz)   BMI 28.91 kg/m           Marivel Ham MD  Resident, Laboratory medicine and pathology  [facility]  T:[phone]        ATTESTATION AND ADDENDUM: I, Jj Beebe MD, have reviewed the patient records and discussed this case with the Transfusion Medicine Resident (Dr. Ham) and apheresis nursing staff. I agree with the assessment and plan in this note.       "

## 2024-02-28 NOTE — TELEPHONE ENCOUNTER
This was placed into the patient's chart yesterday during their appointment     Informed the patient that they will be getting a call but they can call the clinic since they want to be seen CRISTINA NG, ISAC

## 2024-02-29 ENCOUNTER — TELEPHONE (OUTPATIENT)
Dept: DERMATOLOGY | Facility: CLINIC | Age: 64
End: 2024-02-29
Payer: COMMERCIAL

## 2024-02-29 NOTE — TELEPHONE ENCOUNTER
Writer called melanie to inform to get patient in for consult; pt's spouse already contact and scheduled consult.     Melanie stated they got authorization and would reach out to derm if anything arises in regards to insurance

## 2024-02-29 NOTE — TELEPHONE ENCOUNTER
Email sent to Linsey with order referral, will reach out to melanie once confirmation is heard back about insurance    Anthony Cano, EMT-B

## 2024-02-29 NOTE — TELEPHONE ENCOUNTER
Linsey Zambrano <[email or web address]>    Anthony Cano    ATTENTION: This email was sent to your  Physicians account from an external source. Please use extra caution before clicking links, opening attachments, or replying to or forwarding this email.     Cristal Bronson sent me this request yesterday. Auth has been submitted as of this morning. There is coverage for this procedure. ??

## 2024-03-05 RX ORDER — CALCIUM CARBONATE 500 MG/1
500 TABLET, CHEWABLE ORAL
Status: CANCELLED | OUTPATIENT
Start: 2024-03-05

## 2024-03-06 ENCOUNTER — HOSPITAL ENCOUNTER (OUTPATIENT)
Dept: LAB | Facility: CLINIC | Age: 64
Discharge: HOME | End: 2024-03-06
Attending: STUDENT IN AN ORGANIZED HEALTH CARE EDUCATION/TRAINING PROGRAM | Admitting: STUDENT IN AN ORGANIZED HEALTH CARE EDUCATION/TRAINING PROGRAM
Payer: COMMERCIAL

## 2024-03-06 VITALS
HEART RATE: 58 BPM | BODY MASS INDEX: 28.91 KG/M2 | TEMPERATURE: 98.1 F | DIASTOLIC BLOOD PRESSURE: 66 MMHG | WEIGHT: 173.72 LBS | SYSTOLIC BLOOD PRESSURE: 103 MMHG | RESPIRATION RATE: 18 BRPM

## 2024-03-06 LAB
BASOPHILS # BLD AUTO: 0.1 10E3/UL (ref 0–0.2)
BASOPHILS NFR BLD AUTO: 1 %
EOSINOPHIL # BLD AUTO: 0 10E3/UL (ref 0–0.7)
EOSINOPHIL NFR BLD AUTO: 0 %
ERYTHROCYTE [DISTWIDTH] IN BLOOD BY AUTOMATED COUNT: 13.1 % (ref 10–15)
HCT VFR BLD AUTO: 44 % (ref 40–53)
HGB BLD-MCNC: 14.8 G/DL (ref 13.3–17.7)
IMM GRANULOCYTES # BLD: 0 10E3/UL
IMM GRANULOCYTES NFR BLD: 0 %
LYMPHOCYTES # BLD AUTO: 1 10E3/UL (ref 0.8–5.3)
LYMPHOCYTES NFR BLD AUTO: 14 %
MCH RBC QN AUTO: 30.5 PG (ref 26.5–33)
MCHC RBC AUTO-ENTMCNC: 33.6 G/DL (ref 31.5–36.5)
MCV RBC AUTO: 91 FL (ref 78–100)
MONOCYTES # BLD AUTO: 0.7 10E3/UL (ref 0–1.3)
MONOCYTES NFR BLD AUTO: 10 %
NEUTROPHILS # BLD AUTO: 5.1 10E3/UL (ref 1.6–8.3)
NEUTROPHILS NFR BLD AUTO: 75 %
NRBC # BLD AUTO: 0 10E3/UL
NRBC BLD AUTO-RTO: 0 /100
PLATELET # BLD AUTO: 278 10E3/UL (ref 150–450)
RBC # BLD AUTO: 4.85 10E6/UL (ref 4.4–5.9)
WBC # BLD AUTO: 6.9 10E3/UL (ref 4–11)

## 2024-03-06 PROCEDURE — 36522 PHOTOPHERESIS: CPT

## 2024-03-06 PROCEDURE — 250N000011 HC RX IP 250 OP 636: Performed by: STUDENT IN AN ORGANIZED HEALTH CARE EDUCATION/TRAINING PROGRAM

## 2024-03-06 PROCEDURE — 36415 COLL VENOUS BLD VENIPUNCTURE: CPT | Performed by: STUDENT IN AN ORGANIZED HEALTH CARE EDUCATION/TRAINING PROGRAM

## 2024-03-06 PROCEDURE — 85025 COMPLETE CBC W/AUTO DIFF WBC: CPT | Performed by: STUDENT IN AN ORGANIZED HEALTH CARE EDUCATION/TRAINING PROGRAM

## 2024-03-06 PROCEDURE — 250N000009 HC RX 250: Performed by: STUDENT IN AN ORGANIZED HEALTH CARE EDUCATION/TRAINING PROGRAM

## 2024-03-06 RX ORDER — CALCIUM CARBONATE 500 MG/1
500 TABLET, CHEWABLE ORAL
Status: CANCELLED | OUTPATIENT
Start: 2024-03-06

## 2024-03-06 RX ADMIN — ANTICOAGULANT CITRATE DEXTROSE SOLUTION FORMULA A 143 ML: 12.25; 11; 3.65 SOLUTION INTRAVENOUS at 13:57

## 2024-03-06 RX ADMIN — HEPARIN SODIUM 10 ML: 1000 INJECTION INTRAVENOUS; SUBCUTANEOUS at 13:58

## 2024-03-06 NOTE — PROCEDURES
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  He is tolerating the procedure well and his next scheduled one is tomorrow     Attestation:   This patient has been seen and evaluated by me, Wilner Cifuentes.         History of Present Illness   Blane Ugalde is a 63 year old male with an h/o hyperlipidemia. a prothrombin gene mutation.  cutaneous T-cell lymphoma (CTCL)/Sézary syndrome. To summarize his course, he had a skin rash involving the arms that waxed an waned since about 2011. It progressed to involve his torso. Rash was raised, red/purple in color, and intermittently itched and basically did not improve with topical creams.  2 skin biopsies in 2021 were non-diagnostic, but one performed 01/2024 was felt to be most consistent with MF.     The plan is to perform 2 procedures per month on two consecutive days       Past Medical History:   No past medical history on file.          Past Surgical History:   No past surgical history on file.           Social History:     Social History     Tobacco Use    Smoking status: Never    Smokeless tobacco: Never   Substance Use Topics    Alcohol use: Not on file            Allergies:   No Known Allergies          Medications:     Current Outpatient Medications   Medication Sig Dispense Refill    folic acid (FOLVITE) 1 MG tablet Take 1 tablet (1 mg) by mouth daily , except on days that you take methotrexate 26 tablet 11    triamcinolone (KENALOG) 0.1 % external cream APPLY TO AFFECTED AREA 2 TIMES A DAY FOR 10  DAYS TO ARMS, CHEST, NECK, AND BACK      clobetasol propionate (CLOBEX) 0.05 % external shampoo       methotrexate 2.5 MG tablet Take 10 tablets (25 mg) by mouth every 7 days . Take the same day of the week. 40 tablet 3    omeprazole (PRILOSEC) 10 MG DR capsule Take 20 mg by mouth daily      pseudoePHEDrine (SUDAFED) 30 MG tablet Take 30 mg by mouth                 Abbreviated Physical Exam:   /66   Pulse 58   Temp 98.1  F (36.7  C) (Oral)   Resp 18   Wt 78.8 kg (173 lb 11.6 oz)   BMI 28.91 kg/m    Patient Alert & Oriented and in No Acute Distress         Laboratory Data:     BMPNo lab results found in last 7 days.  CBC  Recent Labs   Lab 03/06/24  1317   WBC 6.9   RBC 4.85   HGB 14.8   HCT 44.0   MCV 91   MCH 30.5   MCHC 33.6   RDW 13.1                 Procedure Summary:     A photopheresis is currently being  performed. Peripheral veins are being used for access. A Heparinized Saline prime was used but  Citrate  was the anticoagulant during the procedure.   The patient's vital signs are currently stable and he is tolerating the procedure well.    Attestation:   This patient has been seen and evaluated by me, Wilner Cifuentes.   Wilner Cifuentes MD   Division of Transfusion Medicine   [place] [address]   Pager: [phone]

## 2024-03-07 ENCOUNTER — HOSPITAL ENCOUNTER (OUTPATIENT)
Dept: LAB | Facility: CLINIC | Age: 64
Discharge: HOME | End: 2024-03-07
Attending: STUDENT IN AN ORGANIZED HEALTH CARE EDUCATION/TRAINING PROGRAM | Admitting: STUDENT IN AN ORGANIZED HEALTH CARE EDUCATION/TRAINING PROGRAM
Payer: COMMERCIAL

## 2024-03-07 VITALS
HEART RATE: 59 BPM | WEIGHT: 173.72 LBS | DIASTOLIC BLOOD PRESSURE: 63 MMHG | BODY MASS INDEX: 28.91 KG/M2 | TEMPERATURE: 97.9 F | SYSTOLIC BLOOD PRESSURE: 96 MMHG | RESPIRATION RATE: 18 BRPM

## 2024-03-07 PROCEDURE — 36522 PHOTOPHERESIS: CPT

## 2024-03-07 PROCEDURE — 250N000009 HC RX 250: Performed by: STUDENT IN AN ORGANIZED HEALTH CARE EDUCATION/TRAINING PROGRAM

## 2024-03-07 PROCEDURE — 250N000011 HC RX IP 250 OP 636: Performed by: STUDENT IN AN ORGANIZED HEALTH CARE EDUCATION/TRAINING PROGRAM

## 2024-03-07 RX ADMIN — HEPARIN SODIUM 10 ML: 1000 INJECTION INTRAVENOUS; SUBCUTANEOUS at 08:00

## 2024-03-07 RX ADMIN — ANTICOAGULANT CITRATE DEXTROSE SOLUTION FORMULA A 163 ML: 12.25; 11; 3.65 SOLUTION INTRAVENOUS at 08:30

## 2024-03-07 NOTE — PROCEDURES
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  He is tolerating the procedure well and his next one is scheduled for Thursday 4/4/2024.     Attestation:   This patient has been seen and evaluated by me, Wilner Cifuentes.         History of Present Illness   Blane Ugalde is a 63 year old male with an h/o hyperlipidemia. a prothrombin gene mutation.  cutaneous T-cell lymphoma (CTCL)/Sézary syndrome. To summarize his course, he had a skin rash involving the arms that waxed an waned since about 2011. It progressed to involve his torso. Rash was raised, red/purple in color, and intermittently itched and basically did not improve with topical creams.  2 skin biopsies in 2021 were non-diagnostic, but one performed 01/2024 was felt to be most consistent with MF.     The plan is to perform 2 procedures per month on two consecutive days       Past Medical History:   No past medical history on file.          Past Surgical History:   No past surgical history on file.           Social History:     Social History     Tobacco Use    Smoking status: Never    Smokeless tobacco: Never   Substance Use Topics    Alcohol use: Not on file            Allergies:   No Known Allergies          Medications:     Current Outpatient Medications   Medication Sig Dispense Refill    clobetasol propionate (CLOBEX) 0.05 % external shampoo       folic acid (FOLVITE) 1 MG tablet Take 1 tablet (1 mg) by mouth daily , except on days that you take methotrexate 26 tablet 11    omeprazole  (PRILOSEC) 10 MG DR capsule Take 20 mg by mouth daily      triamcinolone (KENALOG) 0.1 % external cream APPLY TO AFFECTED AREA 2 TIMES A DAY FOR 10 DAYS TO ARMS, CHEST, NECK, AND BACK      methotrexate 2.5 MG tablet Take 10 tablets (25 mg) by mouth every 7 days . Take the same day of the week. 40 tablet 3    pseudoePHEDrine (SUDAFED) 30 MG tablet Take 30 mg by mouth                 Abbreviated Physical Exam:   BP 96/63   Pulse 59   Temp 97.9  F (36.6  C) (Oral)   Resp 18   Wt 78.8 kg (173 lb 11.6 oz)   BMI 28.91 kg/m    Patient Alert & Oriented and in No Acute Distress         Laboratory Data:     BMPNo lab results found in last 7 days.  CBC  Recent Labs   Lab 03/06/24  1317   WBC 6.9   RBC 4.85   HGB 14.8   HCT 44.0   MCV 91   MCH 30.5   MCHC 33.6   RDW 13.1                 Procedure Summary:     A photopheresis is currently being  performed. Peripheral veins are being used for access. A Heparinized Saline prime was used but  Citrate  was the anticoagulant during the procedure.   The patient's vital signs are currently stable and he is tolerating the procedure well.    Attestation:   This patient has been seen and evaluated by me, Wilner Cifuentes.   Wilner Cifuentes MD   Division of Transfusion Medicine   Department [place] [address]   Pager: [phone]     Self/Spouse/RN

## 2024-03-10 ENCOUNTER — HEALTH MAINTENANCE LETTER (OUTPATIENT)
Age: 64
End: 2024-03-10

## 2024-03-14 ENCOUNTER — PATIENT OUTREACH (OUTPATIENT)
Dept: ONCOLOGY | Facility: CLINIC | Age: 64
End: 2024-03-14
Payer: COMMERCIAL

## 2024-03-14 NOTE — PROGRESS NOTES
[facility]: Cancer Care                                                                                          Completed chart audit to update Oncology Care Coordination enrollment status.  Reviewed POC and pt has appropriate follow up scheduled. Pt following with dermatology at this time, oncology will follow intermittently.    FAISAL ValderramaN, RN  RN Care Coordinator  [facility]

## 2024-04-04 ENCOUNTER — HOSPITAL ENCOUNTER (OUTPATIENT)
Dept: LAB | Facility: CLINIC | Age: 64
Discharge: HOME | End: 2024-04-04
Attending: STUDENT IN AN ORGANIZED HEALTH CARE EDUCATION/TRAINING PROGRAM | Admitting: PATHOLOGY
Payer: COMMERCIAL

## 2024-04-04 VITALS
TEMPERATURE: 97.8 F | SYSTOLIC BLOOD PRESSURE: 94 MMHG | BODY MASS INDEX: 28.91 KG/M2 | WEIGHT: 173.72 LBS | RESPIRATION RATE: 18 BRPM | DIASTOLIC BLOOD PRESSURE: 56 MMHG | HEART RATE: 57 BPM

## 2024-04-04 DIAGNOSIS — Z79.899 ENCOUNTER FOR LONG-TERM (CURRENT) USE OF HIGH-RISK MEDICATION: ICD-10-CM

## 2024-04-04 LAB
ALBUMIN SERPL BCG-MCNC: 4.1 G/DL (ref 3.5–5.2)
ALP SERPL-CCNC: 60 U/L (ref 40–150)
ALT SERPL W P-5'-P-CCNC: 18 U/L (ref 0–70)
ANION GAP SERPL CALCULATED.3IONS-SCNC: 8 MMOL/L (ref 7–15)
AST SERPL W P-5'-P-CCNC: 22 U/L (ref 0–45)
BASOPHILS # BLD AUTO: 0.1 10E3/UL (ref 0–0.2)
BASOPHILS NFR BLD AUTO: 1 %
BILIRUB SERPL-MCNC: 0.8 MG/DL
BUN SERPL-MCNC: 14.4 MG/DL (ref 8–23)
CALCIUM SERPL-MCNC: 9.2 MG/DL (ref 8.8–10.2)
CHLORIDE SERPL-SCNC: 102 MMOL/L (ref 98–107)
CREAT SERPL-MCNC: 0.83 MG/DL (ref 0.67–1.17)
DEPRECATED HCO3 PLAS-SCNC: 28 MMOL/L (ref 22–29)
EGFRCR SERPLBLD CKD-EPI 2021: >90 ML/MIN/1.73M2
EOSINOPHIL # BLD AUTO: 0 10E3/UL (ref 0–0.7)
EOSINOPHIL NFR BLD AUTO: 1 %
ERYTHROCYTE [DISTWIDTH] IN BLOOD BY AUTOMATED COUNT: 13.9 % (ref 10–15)
GLUCOSE SERPL-MCNC: 82 MG/DL (ref 70–99)
HCT VFR BLD AUTO: 41.7 % (ref 40–53)
HGB BLD-MCNC: 14.2 G/DL (ref 13.3–17.7)
IMM GRANULOCYTES # BLD: 0 10E3/UL
IMM GRANULOCYTES NFR BLD: 0 %
LYMPHOCYTES # BLD AUTO: 0.9 10E3/UL (ref 0.8–5.3)
LYMPHOCYTES NFR BLD AUTO: 16 %
MCH RBC QN AUTO: 31.2 PG (ref 26.5–33)
MCHC RBC AUTO-ENTMCNC: 34.1 G/DL (ref 31.5–36.5)
MCV RBC AUTO: 92 FL (ref 78–100)
MONOCYTES # BLD AUTO: 0.6 10E3/UL (ref 0–1.3)
MONOCYTES NFR BLD AUTO: 11 %
NEUTROPHILS # BLD AUTO: 3.8 10E3/UL (ref 1.6–8.3)
NEUTROPHILS NFR BLD AUTO: 71 %
NRBC # BLD AUTO: 0 10E3/UL
NRBC BLD AUTO-RTO: 0 /100
PLATELET # BLD AUTO: 234 10E3/UL (ref 150–450)
POTASSIUM SERPL-SCNC: 4.4 MMOL/L (ref 3.4–5.3)
PROT SERPL-MCNC: 6.6 G/DL (ref 6.4–8.3)
RBC # BLD AUTO: 4.55 10E6/UL (ref 4.4–5.9)
SODIUM SERPL-SCNC: 138 MMOL/L (ref 135–145)
WBC # BLD AUTO: 5.3 10E3/UL (ref 4–11)

## 2024-04-04 PROCEDURE — 250N000009 HC RX 250: Performed by: PATHOLOGY

## 2024-04-04 PROCEDURE — 80053 COMPREHEN METABOLIC PANEL: CPT

## 2024-04-04 PROCEDURE — 36415 COLL VENOUS BLD VENIPUNCTURE: CPT

## 2024-04-04 PROCEDURE — 250N000011 HC RX IP 250 OP 636: Performed by: PATHOLOGY

## 2024-04-04 PROCEDURE — 36522 PHOTOPHERESIS: CPT

## 2024-04-04 PROCEDURE — 85004 AUTOMATED DIFF WBC COUNT: CPT | Performed by: PATHOLOGY

## 2024-04-04 RX ORDER — OFLOXACIN 3 MG/ML
10 SOLUTION AURICULAR (OTIC) 2 TIMES DAILY
COMMUNITY
Start: 2024-03-28 | End: 2024-04-11

## 2024-04-04 RX ADMIN — HEPARIN SODIUM 10 ML: 1000 INJECTION INTRAVENOUS; SUBCUTANEOUS at 13:41

## 2024-04-04 RX ADMIN — ANTICOAGULANT CITRATE DEXTROSE SOLUTION FORMULA A 152 ML: 12.25; 11; 3.65 SOLUTION INTRAVENOUS at 13:41

## 2024-04-04 NOTE — PROCEDURES
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  He is tolerating the procedure well.  He notes overall feeling well today. Had a recent ear infection, and is completing a series of antibiotics.  Denies nausea, vomiting, fevers, chills. No significant changes to his skin at this point.    His next scheduled procedure is tomorrow.  Continue with plan per his clinical team.             History of Present Illness   Blane Ugalde is a 63 year old male with an h/o hyperlipidemia. a prothrombin gene mutation.  cutaneous T-cell lymphoma (CTCL)/Sézary syndrome. To summarize his course, he had a skin rash involving the arms that waxed an waned since about 2011. It progressed to involve his torso. Rash was raised, red/purple in color, and intermittently itched and basically did not improve with topical creams.  2 skin biopsies in 2021 were non-diagnostic, but one performed 01/2024 was felt to be most consistent with MF.   ECP treatment was started in March 2024.     The plan is to perform 2 procedures per month on two consecutive days       Past Medical History:   No past medical history on file.  CTCL          Past Surgical History:   No past surgical history on file.           Allergies:   No Known Allergies          Medications:     Current Outpatient Medications   Medication Sig Dispense Refill    amoxicillin-clavulanate (AUGMENTIN) 875-125 MG tablet Take 1 tablet by mouth 2 times daily      clobetasol propionate (CLOBEX) 0.05 % external shampoo       folic acid (FOLVITE) 1 MG tablet Take  1 tablet (1 mg) by mouth daily , except on days that you take methotrexate 26 tablet 11    methotrexate 2.5 MG tablet Take 10 tablets (25 mg) by mouth every 7 days . Take the same day of the week. 40 tablet 3    ofloxacin (FLOXIN) 0.3 % otic solution Place 10 drops into both ears 2 times daily      omeprazole (PRILOSEC) 10 MG DR capsule Take 20 mg by mouth daily      triamcinolone (KENALOG) 0.1 % external cream APPLY TO AFFECTED AREA 2 TIMES A DAY FOR 10 DAYS TO ARMS, CHEST, NECK, AND BACK      pseudoePHEDrine (SUDAFED) 30 MG tablet Take 30 mg by mouth                 Exam:   /74   Pulse 58   Temp 97.5  F (36.4  C) (Oral)   Resp 18   Alert, no apparent distress  Breathing appears comfortable on room air.  Peripheral IV access for the procedure.         Laboratory Data:     BMP  Recent Labs   Lab 04/04/24  1246      POTASSIUM 4.4   CHLORIDE 102   TASHA 9.2   CO2 28   BUN 14.4   CR 0.83   GLC 82     CBC  Recent Labs   Lab 04/04/24  1246   WBC 5.3   RBC 4.55   HGB 14.2   HCT 41.7   MCV 92   MCH 31.2   MCHC 34.1   RDW 13.9                     Procedure Summary:   Extracorporeal photopheresis was performed on a [employer] device. Peripheral IV was used for access. Heparinized saline was used to prime the circuit and ACD-A was used during the procedure for anticoagulation.  The patient's vital signs were stable throughout.  The patient tolerated the procedure well without complication.  See apheresis flowsheet for additional details.           Attestation: During the procedure the patient was directly seen and evaluated by me, Tremayne Gillis MD.    Tremayne Gillis MD  Transfusion Medicine Attending  Laboratory Medicine & Pathology

## 2024-04-04 NOTE — DISCHARGE INSTRUCTIONS
Apheresis Blood Donor Center Post Instructions  You may feel tired after your procedure today.   Please call your doctor if you have:  bleeding that doesn t stop, fever, pain where a needle or tube (catheter) was placed, seizures, trouble breathing, red urine, nausea or vomiting, other health concerns.     If your symptoms are severe, call 911.  If your veins were used, keep the bandages on for 2-4 hours.  Avoid heavy lifting with your arms.  If bleeding occurs from these sites, apply firm pressure for 5-10 minutes.  Call your physician if bleeding continues.    The Apheresis/Blood Donor Center is open Monday-Friday 7:30 a.m. to 5 p.m.  The phone number is [phone].  A Transfusion Medicine physician can be reached after 5:00 p.m. weekdays and on weekends /Holidays by calling [phone], and asking for the physician on call.      Photopheresis:  Avoid sunlight , and wear UVA-protective, full coverage sunglasses and sunscreen SPF 15 or higher  for 24 hours after your treatment.  The drug used in your treatment makes patients more sensitive to sunlight for about 24 hours after the treatment.

## 2024-04-05 ENCOUNTER — HOSPITAL ENCOUNTER (OUTPATIENT)
Dept: LAB | Facility: CLINIC | Age: 64
Discharge: HOME | End: 2024-04-05
Attending: STUDENT IN AN ORGANIZED HEALTH CARE EDUCATION/TRAINING PROGRAM | Admitting: STUDENT IN AN ORGANIZED HEALTH CARE EDUCATION/TRAINING PROGRAM
Payer: COMMERCIAL

## 2024-04-05 VITALS
RESPIRATION RATE: 18 BRPM | DIASTOLIC BLOOD PRESSURE: 56 MMHG | HEART RATE: 58 BPM | TEMPERATURE: 98.8 F | SYSTOLIC BLOOD PRESSURE: 93 MMHG

## 2024-04-05 PROCEDURE — 250N000009 HC RX 250: Performed by: PATHOLOGY

## 2024-04-05 PROCEDURE — 250N000011 HC RX IP 250 OP 636: Performed by: PATHOLOGY

## 2024-04-05 PROCEDURE — 36522 PHOTOPHERESIS: CPT

## 2024-04-05 RX ADMIN — HEPARIN SODIUM 10 ML: 1000 INJECTION INTRAVENOUS; SUBCUTANEOUS at 10:58

## 2024-04-05 RX ADMIN — ANTICOAGULANT CITRATE DEXTROSE SOLUTION FORMULA A: 12.25; 11; 3.65 SOLUTION INTRAVENOUS at 10:57

## 2024-04-06 NOTE — PROCEDURES
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  He is tolerating the procedure well.  He notes overall feeling well today.   Denies nausea, vomiting, fevers, chills. No significant changes to his skin at this point.    His next scheduled procedure series is in about a month.  Continue with plan per his clinical team.             History of Present Illness   Blane Ugalde is a 63 year old male with an h/o hyperlipidemia. a prothrombin gene mutation.  cutaneous T-cell lymphoma (CTCL)/Sézary syndrome. To summarize his course, he had a skin rash involving the arms that waxed an waned since about 2011. It progressed to involve his torso. Rash was raised, red/purple in color, and intermittently itched and basically did not improve with topical creams.  2 skin biopsies in 2021 were non-diagnostic, but one performed 01/2024 was felt to be most consistent with MF.   ECP treatment was started in March 2024.     The plan is to perform 2 procedures per month on two consecutive days       Past Medical History:   No past medical history on file.  CTCL          Past Surgical History:   No past surgical history on file.           Allergies:   No Known Allergies          Medications:     Current Outpatient Medications   Medication Sig Dispense Refill    amoxicillin-clavulanate (AUGMENTIN) 875-125 MG tablet Take 1 tablet by mouth 2 times daily      omeprazole (PRILOSEC) 10 MG DR capsule Take 20 mg by mouth daily      clobetasol propionate (CLOBEX) 0.05 % external shampoo       folic acid (FOLVITE) 1  MG tablet Take 1 tablet (1 mg) by mouth daily , except on days that you take methotrexate 26 tablet 11    methotrexate 2.5 MG tablet Take 10 tablets (25 mg) by mouth every 7 days . Take the same day of the week. 40 tablet 3    ofloxacin (FLOXIN) 0.3 % otic solution Place 10 drops into both ears 2 times daily      pseudoePHEDrine (SUDAFED) 30 MG tablet Take 30 mg by mouth      triamcinolone (KENALOG) 0.1 % external cream APPLY TO AFFECTED AREA 2 TIMES A DAY FOR 10 DAYS TO ARMS, CHEST, NECK, AND BACK                 Exam:   BP 93/56   Pulse 58   Temp 98.8  F (37.1  C) (Oral)   Resp 18   Alert, no apparent distress  Breathing appears comfortable on room air.  Peripheral IV access for the procedure.         Laboratory Data:     BMP  Recent Labs   Lab 04/04/24  1246      POTASSIUM 4.4   CHLORIDE 102   TASHA 9.2   CO2 28   BUN 14.4   CR 0.83   GLC 82     CBC  Recent Labs   Lab 04/04/24  1246   WBC 5.3   RBC 4.55   HGB 14.2   HCT 41.7   MCV 92   MCH 31.2   MCHC 34.1   RDW 13.9                     Procedure Summary:   Extracorporeal photopheresis was performed on a [employer] device. Peripheral IV was used for access. Heparinized saline was used to prime the circuit and ACD-A was used during the procedure for anticoagulation.  The patient's vital signs were stable throughout.  The patient tolerated the procedure well without complication.  See apheresis flowsheet for additional details.           Attestation: During the procedure the patient was directly seen and evaluated by me, Tremayne Gillis MD.    Tremayne Gillis MD  Transfusion Medicine Attending  Laboratory Medicine & Pathology

## 2024-04-18 ENCOUNTER — LAB (OUTPATIENT)
Dept: LAB | Facility: CLINIC | Age: 64
End: 2024-04-18
Payer: COMMERCIAL

## 2024-04-18 DIAGNOSIS — D47.9 LYMPHOPROLIFERATIVE DISORDER (H): Primary | ICD-10-CM

## 2024-04-18 PROCEDURE — 88321 CONSLTJ&REPRT SLD PREP ELSWR: CPT | Performed by: STUDENT IN AN ORGANIZED HEALTH CARE EDUCATION/TRAINING PROGRAM

## 2024-04-22 LAB
PATH REPORT.COMMENTS IMP SPEC: ABNORMAL
PATH REPORT.COMMENTS IMP SPEC: YES
PATH REPORT.FINAL DX SPEC: ABNORMAL
PATH REPORT.GROSS SPEC: ABNORMAL
PATH REPORT.MICROSCOPIC SPEC OTHER STN: ABNORMAL
PATH REPORT.RELEVANT HX SPEC: ABNORMAL
PATH REPORT.SITE OF ORIGIN SPEC: ABNORMAL

## 2024-04-30 DIAGNOSIS — C84.09 MYCOSIS FUNGOIDES INVOLVING EXTRANODAL SITE EXCLUDING SPLEEN AND OTHER SOLID ORGANS (H): Primary | ICD-10-CM

## 2024-05-01 ENCOUNTER — TELEPHONE (OUTPATIENT)
Dept: ONCOLOGY | Facility: CLINIC | Age: 64
End: 2024-05-01

## 2024-05-01 ENCOUNTER — ONCOLOGY VISIT (OUTPATIENT)
Dept: ONCOLOGY | Facility: CLINIC | Age: 64
End: 2024-05-01
Attending: INTERNAL MEDICINE
Payer: COMMERCIAL

## 2024-05-01 ENCOUNTER — APPOINTMENT (OUTPATIENT)
Dept: LAB | Facility: CLINIC | Age: 64
End: 2024-05-01
Attending: INTERNAL MEDICINE
Payer: COMMERCIAL

## 2024-05-01 ENCOUNTER — HOSPITAL ENCOUNTER (OUTPATIENT)
Dept: LAB | Facility: CLINIC | Age: 64
Discharge: HOME | End: 2024-05-01
Attending: STUDENT IN AN ORGANIZED HEALTH CARE EDUCATION/TRAINING PROGRAM
Payer: COMMERCIAL

## 2024-05-01 VITALS
BODY MASS INDEX: 28.91 KG/M2 | OXYGEN SATURATION: 96 % | WEIGHT: 173.7 LBS | HEART RATE: 60 BPM | TEMPERATURE: 97.7 F | RESPIRATION RATE: 16 BRPM | DIASTOLIC BLOOD PRESSURE: 78 MMHG | SYSTOLIC BLOOD PRESSURE: 122 MMHG

## 2024-05-01 VITALS
HEART RATE: 62 BPM | TEMPERATURE: 98 F | SYSTOLIC BLOOD PRESSURE: 93 MMHG | DIASTOLIC BLOOD PRESSURE: 51 MMHG | RESPIRATION RATE: 16 BRPM

## 2024-05-01 DIAGNOSIS — C84.09 MYCOSIS FUNGOIDES INVOLVING EXTRANODAL SITE EXCLUDING SPLEEN AND OTHER SOLID ORGANS (H): ICD-10-CM

## 2024-05-01 LAB
ALBUMIN SERPL BCG-MCNC: 4.3 G/DL (ref 3.5–5.2)
ALP SERPL-CCNC: 67 U/L (ref 40–150)
ALT SERPL W P-5'-P-CCNC: 27 U/L (ref 0–70)
ANION GAP SERPL CALCULATED.3IONS-SCNC: 9 MMOL/L (ref 7–15)
AST SERPL W P-5'-P-CCNC: 28 U/L (ref 0–45)
BASOPHILS # BLD AUTO: 0.1 10E3/UL (ref 0–0.2)
BASOPHILS NFR BLD AUTO: 1 %
BILIRUB SERPL-MCNC: 0.7 MG/DL
BUN SERPL-MCNC: 12.3 MG/DL (ref 8–23)
CALCIUM SERPL-MCNC: 9.4 MG/DL (ref 8.8–10.2)
CHLORIDE SERPL-SCNC: 104 MMOL/L (ref 98–107)
CREAT SERPL-MCNC: 0.95 MG/DL (ref 0.67–1.17)
DEPRECATED HCO3 PLAS-SCNC: 26 MMOL/L (ref 22–29)
EGFRCR SERPLBLD CKD-EPI 2021: 89 ML/MIN/1.73M2
EOSINOPHIL # BLD AUTO: 0 10E3/UL (ref 0–0.7)
EOSINOPHIL NFR BLD AUTO: 1 %
ERYTHROCYTE [DISTWIDTH] IN BLOOD BY AUTOMATED COUNT: 14.2 % (ref 10–15)
GLUCOSE SERPL-MCNC: 65 MG/DL (ref 70–99)
HCT VFR BLD AUTO: 43.3 % (ref 40–53)
HGB BLD-MCNC: 14.7 G/DL (ref 13.3–17.7)
IMM GRANULOCYTES # BLD: 0 10E3/UL
IMM GRANULOCYTES NFR BLD: 0 %
LDH SERPL L TO P-CCNC: 246 U/L (ref 0–250)
LYMPHOCYTES # BLD AUTO: 0.9 10E3/UL (ref 0.8–5.3)
LYMPHOCYTES NFR BLD AUTO: 17 %
MCH RBC QN AUTO: 31.8 PG (ref 26.5–33)
MCHC RBC AUTO-ENTMCNC: 33.9 G/DL (ref 31.5–36.5)
MCV RBC AUTO: 94 FL (ref 78–100)
MONOCYTES # BLD AUTO: 0.8 10E3/UL (ref 0–1.3)
MONOCYTES NFR BLD AUTO: 16 %
NEUTROPHILS # BLD AUTO: 3.4 10E3/UL (ref 1.6–8.3)
NEUTROPHILS NFR BLD AUTO: 65 %
NRBC # BLD AUTO: 0 10E3/UL
NRBC BLD AUTO-RTO: 0 /100
PATH REPORT.COMMENTS IMP SPEC: ABNORMAL
PATH REPORT.COMMENTS IMP SPEC: YES
PATH REPORT.FINAL DX SPEC: ABNORMAL
PATH REPORT.MICROSCOPIC SPEC OTHER STN: ABNORMAL
PATH REPORT.RELEVANT HX SPEC: ABNORMAL
PLATELET # BLD AUTO: 259 10E3/UL (ref 150–450)
POTASSIUM SERPL-SCNC: 4.3 MMOL/L (ref 3.4–5.3)
PROT SERPL-MCNC: 6.9 G/DL (ref 6.4–8.3)
RBC # BLD AUTO: 4.62 10E6/UL (ref 4.4–5.9)
SODIUM SERPL-SCNC: 139 MMOL/L (ref 135–145)
URATE SERPL-MCNC: 6.4 MG/DL (ref 3.4–7)
WBC # BLD AUTO: 5.1 10E3/UL (ref 4–11)

## 2024-05-01 PROCEDURE — 88184 FLOWCYTOMETRY/ TC 1 MARKER: CPT | Performed by: STUDENT IN AN ORGANIZED HEALTH CARE EDUCATION/TRAINING PROGRAM

## 2024-05-01 PROCEDURE — 36415 COLL VENOUS BLD VENIPUNCTURE: CPT | Performed by: INTERNAL MEDICINE

## 2024-05-01 PROCEDURE — 80053 COMPREHEN METABOLIC PANEL: CPT | Performed by: INTERNAL MEDICINE

## 2024-05-01 PROCEDURE — 250N000009 HC RX 250: Performed by: PATHOLOGY

## 2024-05-01 PROCEDURE — 84550 ASSAY OF BLOOD/URIC ACID: CPT | Performed by: INTERNAL MEDICINE

## 2024-05-01 PROCEDURE — 88188 FLOWCYTOMETRY/READ 9-15: CPT | Mod: GC | Performed by: STUDENT IN AN ORGANIZED HEALTH CARE EDUCATION/TRAINING PROGRAM

## 2024-05-01 PROCEDURE — 99215 OFFICE O/P EST HI 40 MIN: CPT | Performed by: INTERNAL MEDICINE

## 2024-05-01 PROCEDURE — 83615 LACTATE (LD) (LDH) ENZYME: CPT | Performed by: INTERNAL MEDICINE

## 2024-05-01 PROCEDURE — 88185 FLOWCYTOMETRY/TC ADD-ON: CPT | Performed by: INTERNAL MEDICINE

## 2024-05-01 PROCEDURE — G0463 HOSPITAL OUTPT CLINIC VISIT: HCPCS | Mod: 25 | Performed by: INTERNAL MEDICINE

## 2024-05-01 PROCEDURE — 85025 COMPLETE CBC W/AUTO DIFF WBC: CPT | Performed by: INTERNAL MEDICINE

## 2024-05-01 PROCEDURE — 36522 PHOTOPHERESIS: CPT

## 2024-05-01 PROCEDURE — 250N000011 HC RX IP 250 OP 636: Performed by: PATHOLOGY

## 2024-05-01 RX ORDER — CALCIUM CARBONATE 500 MG/1
500 TABLET, CHEWABLE ORAL
Status: DISCONTINUED | OUTPATIENT
Start: 2024-05-01 | End: 2024-05-01

## 2024-05-01 RX ORDER — CETIRIZINE HYDROCHLORIDE 10 MG/1
10 TABLET ORAL
COMMUNITY
Start: 2024-03-22 | End: 2025-03-27

## 2024-05-01 RX ORDER — HYDROXYZINE HYDROCHLORIDE 25 MG/1
25-50 TABLET, FILM COATED ORAL 3 TIMES DAILY PRN
Qty: 60 TABLET | Refills: 2 | Status: SHIPPED | OUTPATIENT
Start: 2024-05-01 | End: 2024-08-14

## 2024-05-01 RX ORDER — ACETAMINOPHEN 325 MG/1
650 TABLET ORAL
COMMUNITY

## 2024-05-01 RX ADMIN — ANTICOAGULANT CITRATE DEXTROSE SOLUTION FORMULA A 148 ML: 12.25; 11; 3.65 SOLUTION INTRAVENOUS at 15:28

## 2024-05-01 RX ADMIN — HEPARIN SODIUM 10 ML: 1000 INJECTION INTRAVENOUS; SUBCUTANEOUS at 14:02

## 2024-05-01 ASSESSMENT — PAIN SCALES - GENERAL: PAINLEVEL: NO PAIN (0)

## 2024-05-01 NOTE — PROCEDURES
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  He is tolerating the procedure well.  He notes overall feeling well today.  Denies nausea, vomiting, fevers, chills. No significant changes to his skin at this point.      His next scheduled procedure is tomorrow.  Continue with plan per his clinical team.             History of Present Illness   Blane Ugalde is a 63 year old male with an h/o hyperlipidemia. a prothrombin gene mutation.  cutaneous T-cell lymphoma (CTCL)/Sézary syndrome. To summarize his course, he had a skin rash involving the arms that waxed an waned since about 2011. It progressed to involve his torso. Rash was raised, red/purple in color, and intermittently itched and basically did not improve with topical creams.  2 skin biopsies in 2021 were non-diagnostic, but one performed 01/2024 was felt to be most consistent with MF.   ECP treatment was started in March 2024.     The plan is to perform 2 procedures per month on two consecutive days       Past Medical History:   No past medical history on file.  CTCL          Past Surgical History:   No past surgical history on file.           Allergies:   No Known Allergies          Medications:     Current Outpatient Medications   Medication Sig Dispense Refill    acetaminophen (TYLENOL) 325 MG tablet Take 650 mg by mouth      cetirizine (ZYRTEC) 10 MG tablet Take 10 mg by mouth      clobetasol propionate (CLOBEX) 0.05 % external shampoo       folic acid (FOLVITE) 1 MG tablet Take 1 tablet (1 mg) by mouth daily , except on  days that you take methotrexate 26 tablet 11    hydrOXYzine HCl (ATARAX) 25 MG tablet Take 1-2 tablets (25-50 mg) by mouth 3 times daily as needed for itching 60 tablet 2    methotrexate 2.5 MG tablet Take 10 tablets (25 mg) by mouth every 7 days . Take the same day of the week. 40 tablet 3    omeprazole (PRILOSEC) 10 MG DR capsule Take 20 mg by mouth daily      pseudoePHEDrine (SUDAFED) 30 MG tablet Take 30 mg by mouth      triamcinolone (KENALOG) 0.1 % external cream APPLY TO AFFECTED AREA 2 TIMES A DAY FOR 10 DAYS TO ARMS, CHEST, NECK, AND BACK                 Exam:   BP 93/51   Pulse 62   Temp 98  F (36.7  C) (Oral)   Resp 16   Alert, no apparent distress  Breathing appears comfortable on room air.  Peripheral IV access for the procedure.         Laboratory Data:     BMP  Recent Labs   Lab 05/01/24  1031      POTASSIUM 4.3   CHLORIDE 104   TASHA 9.4   CO2 26   BUN 12.3   CR 0.95   GLC 65*     CBC  Recent Labs   Lab 05/01/24  1031   WBC 5.1   RBC 4.62   HGB 14.7   HCT 43.3   MCV 94   MCH 31.8   MCHC 33.9   RDW 14.2                     Procedure Summary:   Extracorporeal photopheresis was performed on a [employer] device. Peripheral IV was used for access. Heparinized saline was used to prime the circuit and ACD-A was used during the procedure for anticoagulation.  The patient's vital signs were stable throughout.  The patient tolerated the procedure well without complication.  See apheresis flowsheet for additional details.       ATTESTATION STATEMENT:   During the procedure this patient was directly seen and evaluated by me , Alexa Alford MD, PhD.      Alexa Alford MD, PhD  Transfusion Medicine Attending  Medical Director, Blood Bank Laboratory  Pager [phone]

## 2024-05-01 NOTE — TELEPHONE ENCOUNTER
Retail Pharmacy Prior Authorization Team   Phone: [phone]    PA Initiation    Medication: HYDROXYZINE HCL 25 MG PO TABS  Insurance Company: [employer] - Phone [phone] Fax [phone]  Pharmacy Filling the Rx: [id number] - [place] [address] [facility]  Filling Pharmacy Phone: [phone]  Filling Pharmacy Fax: [phone]  Start Date: 5/1/2024

## 2024-05-01 NOTE — PROGRESS NOTES
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  To summarize his course, he had a skin rash involving the arms that waxed an waned since about 2011.  It progressed to involve his torso.  Rash was raised, red/purple in color, and intermittently itched.  There was limited benefit from topical creams.  Skin biopsy in early 2021 suggested interface dermatitis.  Skin biopsy in 07/2021 had atypical lymphoid infiltrates but with no specific histologic diagnosis.  Skin biopsy in 01/2024 was felt to be most consistent with MF.  Bone marrow biopsy in 02/2024 had no obvious bone marrow involvement but peripheral blood flow cytometry showed about 0.5 x 10^9/L clonal T cells (Sezary cells).  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  He had received NBUVB treatment in the past with some improvement in itching and some relief from topical steroids but not durable.  Connected with our Derm Lymphoma Team in 02/2024 and started ECP and weekly low-dose methotrexate.  Visit for recommendations regarding management of MF vs SS.  He had a CT scan on 4/26/2024 that showed prominent axillary lymphadenopathy on the right greater than left and slightly enlarged bilateral inguinal lymphadenopathy.  Visit for ongoing management of lymphoma.    He is with wife Irma and son Sterling.  Overall he is doing ok. Currently doing weekly low-dose methotrexate and once-monthly ECP, recently restarted home UV therapy. He does feel that his skin symptoms have improved, especially with improvement in the skin peeling over feet and hands which has almost resolved, the redness persists but is improving, LE more improved than UE. He also reports  being able to feel lumps/lymph nodes in his right elbow, right armpits and bilateral groins - slightly enlarged and more evident in the past .He reports significant pruritus globally, mildly improved with topical agents but is still bothersome. He had a recent ear infection 1 month ago that was treated locally with antibiotics. No fevers, night sweats, or unintentional weight loss.  No headache or focal weakness or sensory changes.  Normal bowel function.  No urinary complaints.  No bleeding or unusual bruising.  No other lumps other than described above. He is continuing to work full time, takes daily naps in the afternoon to recharge.     PAST MEDICAL HISTORY:  Prothrombin gene mutation, hyperlipidemia    MEDICATIONS:  Current Outpatient Medications   Medication Sig Dispense Refill    cetirizine (ZYRTEC) 10 MG tablet Take 10 mg by mouth      hydrOXYzine HCl (ATARAX) 25 MG tablet Take 1-2 tablets (25-50 mg) by mouth 3 times daily as needed for itching 60 tablet 2    acetaminophen (TYLENOL) 325 MG tablet Take 650 mg by mouth      clobetasol propionate (CLOBEX) 0.05 % external shampoo       folic acid (FOLVITE) 1 MG tablet Take 1 tablet (1 mg) by mouth daily , except on days that you take methotrexate 26 tablet 11    methotrexate 2.5 MG tablet Take 10 tablets (25 mg) by mouth every 7 days . Take the same day of the week. 40 tablet 3    omeprazole (PRILOSEC) 10 MG DR capsule Take 20 mg by mouth daily      pseudoePHEDrine (SUDAFED) 30 MG tablet Take 30 mg by mouth      triamcinolone (KENALOG) 0.1 % external cream APPLY TO AFFECTED AREA 2 TIMES A DAY FOR 10 DAYS TO ARMS, CHEST, NECK, AND BACK       No current facility-administered medications for this visit.     FAMILY HISTORY:  Family history of pancreatic and stomach cancer, no known blood cancers, several men with blood clots on father's side    SOCIAL HISTORY:  never smoker, , 3 kids, contractor, lives in [place], [place]    PHYSICAL EXAMINATION:  /78  (BP Location: Right arm, Patient Position: Sitting, Cuff Size: Adult Regular)   Pulse 60   Temp 97.7  F (36.5  C) (Oral)   Resp 16   Wt 78.8 kg (173 lb 11.2 oz)   SpO2 96%   BMI 28.91 kg/m    Wt Readings from Last 5 Encounters:   05/01/24 78.8 kg (173 lb 11.2 oz)   04/04/24 78.8 kg (173 lb 11.6 oz)   03/07/24 78.8 kg (173 lb 11.6 oz)   03/06/24 78.8 kg (173 lb 11.6 oz)   02/28/24 78.8 kg (173 lb 11.6 oz)     General: Well appearing. Facial erythema+  HEENT: Sclerae anicteric.  Lungs: Breathing comfortably. No cough.  MSK: Grossly normal movement.  Neuro: Grossly non-focal.  Lymph: palpable 1cm right medial epitrochlear lymph node +, 2cm diameter right axillary LN palpable, 1cm left axillary LN, palpable bilateral inguinal LN;   Abdomen: non-tender, soft, spleen not palpable.   Skin/access: Visible skin erythematous, no visible ulceration or plaques/tumors, sparing around the eyes; now improved clearing of erythema in bilateral legs, still persistent over feet; some residual signs of healing desquamation of dorsum feet and hands;   Psych: Alert and oriented. No distress.  Performance status: ECOG 1    LABORATORY DATA: Reviewed by me  Labs reviewed in Care Everywhere  4/24/2024 WBC 5.06, Hb 13.5, platelet 247, creatinine 0.9, liver labs normal    Recent Labs   Lab Test 04/04/24  1246 03/06/24  1317   WBC 5.3 6.9   HGB 14.2 14.8    278   ANEUTAUTO 3.8 5.1   ALYMPAUTO 0.9 1.0     Recent Labs   Lab Test 04/04/24  1246      POTASSIUM 4.4   CHLORIDE 102   CO2 28   BUN 14.4   CR 0.83   TASHA 9.2     Recent Labs   Lab Test 04/04/24  1246   AST 22   ALT 18   ALKPHOS 60   BILITOTAL 0.8      IMAGING DATA: CT CAP 4/26/2024 images reviewed by me showed prominent axillary lymphadenopathy on the right greater than left and bilateral inguinal lymphadenopathy - modestly increased from 02/2024    IMPRESSION AND PLAN:  Blane Tram is a 63 year old with cutaneous T cell lymphoma most consistent with mycosis fungoides  (MF) vs Sézary Syndrome (SS).    Clinical stage IIIB MF has been treated with ECP([id number]) and weekly oral methotrexate since early 03/2024.  There has not been much change to skin, some peeling that seems to be associated with concurrent NBUVB treatments that improved with holding them, and some modest lymphadenopathy in the epitrochlear and inguinal areas.  We discussed that responses can often take weeks to several months - so it may be to early to make any major changes.  While there is some new but modest lymphadenopathy. Given the temporal correlation of these enlarged lymph nodes with his recent skin desquamation episode, it is uncertain whether this could be reactive vs lymphoma related. If there is continued progression of lymph nodes, skin, or symptoms we discussed obtaining a PET/CT to better characterize LAD and consider a biopsy of any larger and/or more hypermetabolic lymph nodes (also to assess for CD30 expression.  In the mean time, there is no urgent need to make changes, and I recommended he continue the current treatment that could still lead to an improved response.    If there is no improvement in near future, then changing methotrexate to something like bexarotene would be reasonable and if there is worrisome/threatening progression and/or grecia disease we could consider transitioning to something more intensive like romidepsin or brentuximab depending on CD30 expression.    He does have significant pruritus, only moderately improved with topical agents, will add on PO Atarax as needed, discussed concerns with urinary retention, sedation.     He has no active ID issues.  I have recommended Prevnar 20, the Shingrix vaccine series, a flu shot, RSV vaccine, and an updated COVID-19 vaccine if he has not received these.  He declined vaccines.    He will continue to work with his primary care clinic for health maintenance and other medical issues.  We will keep them in the loop.    He will keep his  scheduled  Derm Lymphoma visit with Dr. Rico on 5/28.  We will also touch base with Dr. Rico regarding our impressions today and schedule follow-up with our clinic in 3 months time.  I provided contact information for our clinic and asked them to contact us if questions, concerns, or new issues come up between visits.    The patient was seen with and the above assessment and plan was discussed with staff physician Dr. Roberto Everett PhD MD who agreed with the same.    Lenin Shay   PGY-6 Fellow, Hematology,Oncology and BMT  [facility]        ATTENDING ATTESTATION:  The patient was seen and evaluated by me.  I have reviewed the vital signs, medications, labs, and imaging results independently, and have discussed the patient and plan with the resident/fellow, and agree with the findings and plan outlined in this note.  The impression and plan were jointly made.    Roberto Everett MD, PhD  Attending Physician, [facility]  [phone] [facility]

## 2024-05-01 NOTE — NURSING NOTE
"Oncology Rooming Note    May 1, 2024 10:49 AM   Blane Ugalde is a 63 year old male who presents for:    Chief Complaint   Patient presents with    Blood Draw     Labs drawn via  by RN in lab. VS Taken.     Oncology Clinic Visit     Mycosis fungoides involving extranodal site excluding spleen and other solid organs      Initial Vitals: /78 (BP Location: Right arm, Patient Position: Sitting, Cuff Size: Adult Regular)   Pulse 60   Temp 97.7  F (36.5  C) (Oral)   Resp 16   Wt 78.8 kg (173 lb 11.2 oz)   SpO2 96%   BMI 28.91 kg/m   Estimated body mass index is 28.91 kg/m  as calculated from the following:    Height as of 2/28/24: 1.651 m (5' 5\").    Weight as of this encounter: 78.8 kg (173 lb 11.2 oz). Body surface area is 1.9 meters squared.  No Pain (0) Comment: Data Unavailable   No LMP for male patient.  Allergies reviewed: Yes  Medications reviewed: Yes    Medications: Medication refills not needed today.  Pharmacy name entered into EPIC: [id number] - [place], [place] - [address] [facility]    Frailty Screening:   Is the patient here for a new oncology consult visit in cancer care? 2. No      Clinical concerns: would like to know if he can continue kenalog or if he can get a refill.       Obed Coe              "

## 2024-05-01 NOTE — LETTER
5/1/2024         RE: Blane Ugalde  [address] [facility] [address]        Dear Colleague,    Thank you for referring your patient, Blane Ugalde, to the [facility]. Please see a copy of my visit note below.    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  To summarize his course, he had a skin rash involving the arms that waxed an waned since about 2011.  It progressed to involve his torso.  Rash was raised, red/purple in color, and intermittently itched.  There was limited benefit from topical creams.  Skin biopsy in early 2021 suggested interface dermatitis.  Skin biopsy in 07/2021 had atypical lymphoid infiltrates but with no specific histologic diagnosis.  Skin biopsy in 01/2024 was felt to be most consistent with MF.  Bone marrow biopsy in 02/2024 had no obvious bone marrow involvement but peripheral blood flow cytometry showed about 0.5 x 10^9/L clonal T cells (Sezary cells).  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  He had received NBUVB treatment in the past with some improvement in itching and some relief from topical steroids but not durable.  Connected with our Derm Lymphoma Team in 02/2024 and started ECP and weekly low-dose methotrexate.  Visit for recommendations regarding management of MF vs SS.  He had a CT scan on 4/26/2024 that showed prominent axillary lymphadenopathy on the right greater than left and slightly enlarged bilateral inguinal lymphadenopathy.  Visit for ongoing management of lymphoma.    He is with wife Irma and son Sterling.  Overall he is doing ok. Currently doing weekly low-dose methotrexate and once-monthly ECP, recently  restarted home UV therapy. He does feel that his skin symptoms have improved, especially with improvement in the skin peeling over feet and hands which has almost resolved, the redness persists but is improving, LE more improved than UE. He also reports being able to feel lumps/lymph nodes in his right elbow, right armpits and bilateral groins - slightly enlarged and more evident in the past .He reports significant pruritus globally, mildly improved with topical agents but is still bothersome. He had a recent ear infection 1 month ago that was treated locally with antibiotics. No fevers, night sweats, or unintentional weight loss.  No headache or focal weakness or sensory changes.  Normal bowel function.  No urinary complaints.  No bleeding or unusual bruising.  No other lumps other than described above. He is continuing to work full time, takes daily naps in the afternoon to recharge.     PAST MEDICAL HISTORY:  Prothrombin gene mutation, hyperlipidemia    MEDICATIONS:  Current Outpatient Medications   Medication Sig Dispense Refill    cetirizine (ZYRTEC) 10 MG tablet Take 10 mg by mouth      hydrOXYzine HCl (ATARAX) 25 MG tablet Take 1-2 tablets (25-50 mg) by mouth 3 times daily as needed for itching 60 tablet 2    acetaminophen (TYLENOL) 325 MG tablet Take 650 mg by mouth      clobetasol propionate (CLOBEX) 0.05 % external shampoo       folic acid (FOLVITE) 1 MG tablet Take 1 tablet (1 mg) by mouth daily , except on days that you take methotrexate 26 tablet 11    methotrexate 2.5 MG tablet Take 10 tablets (25 mg) by mouth every 7 days . Take the same day of the week. 40 tablet 3    omeprazole (PRILOSEC) 10 MG DR capsule Take 20 mg by mouth daily      pseudoePHEDrine (SUDAFED) 30 MG tablet Take 30 mg by mouth      triamcinolone (KENALOG) 0.1 % external cream APPLY TO AFFECTED AREA 2 TIMES A DAY FOR 10 DAYS TO ARMS, CHEST, NECK, AND BACK       No current facility-administered medications for this visit.     FAMILY  HISTORY:  Family history of pancreatic and stomach cancer, no known blood cancers, several men with blood clots on father's side    SOCIAL HISTORY:  never smoker, , 3 kids, contractor, lives in [place], [place]    PHYSICAL EXAMINATION:  /78 (BP Location: Right arm, Patient Position: Sitting, Cuff Size: Adult Regular)   Pulse 60   Temp 97.7  F (36.5  C) (Oral)   Resp 16   Wt 78.8 kg (173 lb 11.2 oz)   SpO2 96%   BMI 28.91 kg/m    Wt Readings from Last 5 Encounters:   05/01/24 78.8 kg (173 lb 11.2 oz)   04/04/24 78.8 kg (173 lb 11.6 oz)   03/07/24 78.8 kg (173 lb 11.6 oz)   03/06/24 78.8 kg (173 lb 11.6 oz)   02/28/24 78.8 kg (173 lb 11.6 oz)     General: Well appearing. Facial erythema+  HEENT: Sclerae anicteric.  Lungs: Breathing comfortably. No cough.  MSK: Grossly normal movement.  Neuro: Grossly non-focal.  Lymph: palpable 1cm right medial epitrochlear lymph node +, 2cm diameter right axillary LN palpable, 1cm left axillary LN, palpable bilateral inguinal LN;   Abdomen: non-tender, soft, spleen not palpable.   Skin/access: Visible skin erythematous, no visible ulceration or plaques/tumors, sparing around the eyes; now improved clearing of erythema in bilateral legs, still persistent over feet; some residual signs of healing desquamation of dorsum feet and hands;   Psych: Alert and oriented. No distress.  Performance status: ECOG 1    LABORATORY DATA: Reviewed by me  Labs reviewed in Care Everywhere  4/24/2024 WBC 5.06, Hb 13.5, platelet 247, creatinine 0.9, liver labs normal    Recent Labs   Lab Test 04/04/24  1246 03/06/24  1317   WBC 5.3 6.9   HGB 14.2 14.8    278   ANEUTAUTO 3.8 5.1   ALYMPAUTO 0.9 1.0     Recent Labs   Lab Test 04/04/24  1246      POTASSIUM 4.4   CHLORIDE 102   CO2 28   BUN 14.4   CR 0.83   TASHA 9.2     Recent Labs   Lab Test 04/04/24  1246   AST 22   ALT 18   ALKPHOS 60   BILITOTAL 0.8      IMAGING DATA: CT CAP 4/26/2024 images reviewed by me showed prominent  axillary lymphadenopathy on the right greater than left and bilateral inguinal lymphadenopathy - modestly increased from 02/2024    IMPRESSION AND PLAN:  Blane Ugalde is a 63 year old with 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    Clinical stage IIIB MF has been treated with ECP([id number]) and weekly oral methotrexate since early 03/2024.  There has not been much change to skin, some peeling that seems to be associated with concurrent NBUVB treatments that improved with holding them, and some modest lymphadenopathy in the epitrochlear and inguinal areas.  We discussed that responses can often take weeks to several months - so it may be to early to make any major changes.  While there is some new but modest lymphadenopathy. Given the temporal correlation of these enlarged lymph nodes with his recent skin desquamation episode, it is uncertain whether this could be reactive vs lymphoma related. If there is continued progression of lymph nodes, skin, or symptoms we discussed obtaining a PET/CT to better characterize LAD and consider a biopsy of any larger and/or more hypermetabolic lymph nodes (also to assess for CD30 expression.  In the mean time, there is no urgent need to make changes, and I recommended he continue the current treatment that could still lead to an improved response.    If there is no improvement in near future, then changing methotrexate to something like bexarotene would be reasonable and if there is worrisome/threatening progression and/or grecia disease we could consider transitioning to something more intensive like romidepsin or brentuximab depending on CD30 expression.    He does have significant pruritus, only moderately improved with topical agents, will add on PO Atarax as needed, discussed concerns with urinary retention, sedation.     He has no active ID issues.  I have recommended Prevnar 20, the Shingrix vaccine series, a flu shot,  RSV vaccine, and an updated COVID-19 vaccine if he has not received these.  He declined vaccines.    He will continue to work with his primary care clinic for health maintenance and other medical issues.  We will keep them in the loop.    He will keep his scheduled  Derm Lymphoma visit with Dr. Rico on 5/28.  We will also touch base with Dr. Rico regarding our impressions today and schedule follow-up with our clinic in 3 months time.  I provided contact information for our clinic and asked them to contact us if questions, concerns, or new issues come up between visits.    The patient was seen with and the above assessment and plan was discussed with staff physician Dr. Roberto Everett PhD MD who agreed with the same.    Lenin Shay   PGY-6 Fellow, Hematology,Oncology and BMT  [facility]        ATTENDING ATTESTATION:  The patient was seen and evaluated by me.  I have reviewed the vital signs, medications, labs, and imaging results independently, and have discussed the patient and plan with the resident/fellow, and agree with the findings and plan outlined in this note.  The impression and plan were jointly made.    Roberto Everett MD, PhD  Attending Physician, [facility]  [phone] [facility]

## 2024-05-01 NOTE — NURSING NOTE
Chief Complaint   Patient presents with    Blood Draw     Labs drawn via  by RN in lab. VS Taken.      Labs collected from venipuncture by RN. Vital signs taken. Checked in for appointment(s).    Nicky Morataya RN

## 2024-05-02 ENCOUNTER — HOSPITAL ENCOUNTER (OUTPATIENT)
Dept: LAB | Facility: CLINIC | Age: 64
Discharge: HOME | End: 2024-05-02
Attending: STUDENT IN AN ORGANIZED HEALTH CARE EDUCATION/TRAINING PROGRAM | Admitting: STUDENT IN AN ORGANIZED HEALTH CARE EDUCATION/TRAINING PROGRAM
Payer: COMMERCIAL

## 2024-05-02 VITALS
RESPIRATION RATE: 18 BRPM | DIASTOLIC BLOOD PRESSURE: 60 MMHG | SYSTOLIC BLOOD PRESSURE: 102 MMHG | TEMPERATURE: 98.3 F | HEART RATE: 60 BPM

## 2024-05-02 PROCEDURE — 250N000011 HC RX IP 250 OP 636: Performed by: PATHOLOGY

## 2024-05-02 PROCEDURE — 250N000009 HC RX 250: Performed by: PATHOLOGY

## 2024-05-02 PROCEDURE — 36522 PHOTOPHERESIS: CPT

## 2024-05-02 RX ADMIN — HEPARIN SODIUM 10 ML: 1000 INJECTION INTRAVENOUS; SUBCUTANEOUS at 08:30

## 2024-05-02 RX ADMIN — ANTICOAGULANT CITRATE DEXTROSE SOLUTION FORMULA A 152 ML: 12.25; 11; 3.65 SOLUTION INTRAVENOUS at 08:45

## 2024-05-02 NOTE — PROCEDURES
Laboratory Medicine and Pathology  Transfusion Medicine - Apheresis Procedure Note    Blane Ugalde MRN# [id number]   YOB: 1960 Age: 63 year old   Procedure: Extracorporeal photopheresis (ECP)   Reason for Procedure: CTCL/Sézary s Syndrome                     Assessment and Plan:   Blane Ugalde is a 63 year old male with history of cutaneous T cell lymphoma (mycosis fungoides)/Sézary syndrome who  is undergoing extracorporeal photopheresis therapy.  He tolerated the procedure with no complaints. Denies nausea, vomiting, fevers, chills. No significant changes to his skin at this point.       Continue with plan per his clinical team. Next procedure in approximately one month.            History of Present Illness   Blane Ugalde is a 63 year old male with an h/o hyperlipidemia. a prothrombin gene mutation.  cutaneous T-cell lymphoma (CTCL)/Sézary syndrome. To summarize his course, he had a skin rash involving the arms that waxed an waned since about 2011. It progressed to involve his torso. Rash was raised, red/purple in color, and intermittently itched and basically did not improve with topical creams.  2 skin biopsies in 2021 were non-diagnostic, but one performed 01/2024 was felt to be most consistent with MF.   ECP treatment was started in March 2024.     The plan is to perform 2 procedures per month on two consecutive days       Past Medical History:   No past medical history on file.  CTCL          Past Surgical History:   No past surgical history on file.           Allergies:   No Known Allergies          Medications:     Current Outpatient Medications   Medication Sig Dispense Refill    acetaminophen (TYLENOL) 325 MG tablet Take 650 mg by mouth      cetirizine (ZYRTEC) 10 MG tablet Take 10 mg by mouth      clobetasol propionate (CLOBEX) 0.05 % external shampoo       folic acid (FOLVITE) 1 MG tablet Take 1 tablet (1 mg) by mouth daily , except on days that you take  methotrexate 26 tablet 11    methotrexate 2.5 MG tablet Take 10 tablets (25 mg) by mouth every 7 days . Take the same day of the week. 40 tablet 3    omeprazole (PRILOSEC) 10 MG DR capsule Take 20 mg by mouth daily      triamcinolone (KENALOG) 0.1 % external cream APPLY TO AFFECTED AREA 2 TIMES A DAY FOR 10 DAYS TO ARMS, CHEST, NECK, AND BACK      hydrOXYzine HCl (ATARAX) 25 MG tablet Take 1-2 tablets (25-50 mg) by mouth 3 times daily as needed for itching 60 tablet 2    pseudoePHEDrine (SUDAFED) 30 MG tablet Take 30 mg by mouth                 Exam:   /60   Pulse 60   Temp 98.3  F (36.8  C) (Oral)   Resp 18   Alert, no apparent distress  Breathing appears comfortable on room air.  Peripheral IV access for the procedure.         Laboratory Data:     BMP  Recent Labs   Lab 05/01/24  1031      POTASSIUM 4.3   CHLORIDE 104   TASHA 9.4   CO2 26   BUN 12.3   CR 0.95   GLC 65*     CBC  Recent Labs   Lab 05/01/24  1031   WBC 5.1   RBC 4.62   HGB 14.7   HCT 43.3   MCV 94   MCH 31.8   MCHC 33.9   RDW 14.2                     Procedure Summary:   Extracorporeal photopheresis was performed on a [employer] device. Peripheral IV was used for access. Heparinized saline was used to prime the circuit and ACD-A was used during the procedure for anticoagulation.  The patient's vital signs were stable throughout.  The patient tolerated the procedure well without complication.  See apheresis flowsheet for additional details.       ATTESTATION STATEMENT:   During the procedure this patient was directly seen and evaluated by me , Alexa Alford MD, PhD.      Alexa Alford MD, PhD  Transfusion Medicine Attending  Medical Director, Blood Bank Laboratory  Pager [phone]

## 2024-05-02 NOTE — DISCHARGE INSTRUCTIONS
Apheresis Blood Donor Center Post Instructions  You may feel tired after your procedure today.   Please call your doctor if you have:  bleeding that doesn t stop, pain where a needle was placed, seizures, other health concerns.     If your symptoms are severe, call 911.  Your vein was used, keep the bandages on for 2-4 hours.  Avoid heavy lifting with your arms.  If bleeding occurs from these sites, apply firm pressure for 5-10 minutes.  Call your physician if bleeding continues.    The Apheresis/Blood Donor Center is open Monday-Friday 7:30 a.m. to 5 p.m.  The phone number is [phone].    Photopheresis:  Avoid sunlight , and wear UVA-protective, full coverage sunglasses and sunscreen SPF 15 or higher  for 24 hours after your treatment.  The drug used in your treatment makes patients more sensitive to sunlight for about 24 hours after the treatment.

## 2024-05-02 NOTE — TELEPHONE ENCOUNTER
Prior Authorization Approval    Medication: HYDROXYZINE HCL 25 MG PO TABS  Authorization Effective Date: 2/1/2024  Authorization Expiration Date: 5/1/2025  Approved Dose/Quantity:   Reference #:     Insurance Company: [employer] - Phone [phone] Fax [phone]  Expected CoPay: $    CoPay Card Available:      Financial Assistance Needed:   Which Pharmacy is filling the prescription: [id number] - [place] [address] [facility]  Pharmacy Notified: Yes  Patient Notified:

## 2024-05-28 ENCOUNTER — HOSPITAL ENCOUNTER (OUTPATIENT)
Dept: LAB | Facility: CLINIC | Age: 64
Discharge: HOME | End: 2024-05-28
Attending: DERMATOLOGY | Admitting: DERMATOLOGY
Payer: COMMERCIAL

## 2024-05-28 ENCOUNTER — TELEPHONE (OUTPATIENT)
Dept: DERMATOLOGY | Facility: CLINIC | Age: 64
End: 2024-05-28

## 2024-05-28 ENCOUNTER — OFFICE VISIT (OUTPATIENT)
Dept: DERMATOLOGY | Facility: CLINIC | Age: 64
End: 2024-05-28
Attending: STUDENT IN AN ORGANIZED HEALTH CARE EDUCATION/TRAINING PROGRAM
Payer: COMMERCIAL

## 2024-05-28 VITALS
RESPIRATION RATE: 16 BRPM | HEART RATE: 57 BPM | TEMPERATURE: 97.5 F | SYSTOLIC BLOOD PRESSURE: 105 MMHG | DIASTOLIC BLOOD PRESSURE: 73 MMHG | BODY MASS INDEX: 29.24 KG/M2 | WEIGHT: 175.71 LBS

## 2024-05-28 DIAGNOSIS — C84.00 MYCOSIS FUNGOIDES, UNSPECIFIED BODY REGION (H): Primary | ICD-10-CM

## 2024-05-28 DIAGNOSIS — Z79.899 ENCOUNTER FOR LONG-TERM (CURRENT) USE OF HIGH-RISK MEDICATION: ICD-10-CM

## 2024-05-28 LAB
ACANTHOCYTES BLD QL SMEAR: SLIGHT
ALBUMIN SERPL BCG-MCNC: 4 G/DL (ref 3.5–5.2)
ALP SERPL-CCNC: 64 U/L (ref 40–150)
ALT SERPL W P-5'-P-CCNC: 18 U/L (ref 0–70)
ANION GAP SERPL CALCULATED.3IONS-SCNC: 10 MMOL/L (ref 7–15)
AST SERPL W P-5'-P-CCNC: 30 U/L (ref 0–45)
BASOPHILS # BLD AUTO: ABNORMAL 10*3/UL
BASOPHILS # BLD MANUAL: 0.1 10E3/UL (ref 0–0.2)
BASOPHILS NFR BLD AUTO: ABNORMAL %
BASOPHILS NFR BLD MANUAL: 2 %
BILIRUB SERPL-MCNC: 0.9 MG/DL
BUN SERPL-MCNC: 13.1 MG/DL (ref 8–23)
CALCIUM SERPL-MCNC: 9.1 MG/DL (ref 8.8–10.2)
CHLORIDE SERPL-SCNC: 102 MMOL/L (ref 98–107)
CREAT SERPL-MCNC: 0.87 MG/DL (ref 0.67–1.17)
DEPRECATED HCO3 PLAS-SCNC: 26 MMOL/L (ref 22–29)
EGFRCR SERPLBLD CKD-EPI 2021: >90 ML/MIN/1.73M2
EOSINOPHIL # BLD AUTO: ABNORMAL 10*3/UL
EOSINOPHIL # BLD MANUAL: 0.1 10E3/UL (ref 0–0.7)
EOSINOPHIL NFR BLD AUTO: ABNORMAL %
EOSINOPHIL NFR BLD MANUAL: 1 %
ERYTHROCYTE [DISTWIDTH] IN BLOOD BY AUTOMATED COUNT: 13.6 % (ref 10–15)
GLUCOSE SERPL-MCNC: 94 MG/DL (ref 70–99)
HCT VFR BLD AUTO: 40.1 % (ref 40–53)
HGB BLD-MCNC: 13.7 G/DL (ref 13.3–17.7)
IMM GRANULOCYTES # BLD: ABNORMAL 10*3/UL
IMM GRANULOCYTES NFR BLD: ABNORMAL %
LYMPHOCYTES # BLD AUTO: ABNORMAL 10*3/UL
LYMPHOCYTES # BLD MANUAL: 0.8 10E3/UL (ref 0.8–5.3)
LYMPHOCYTES NFR BLD AUTO: ABNORMAL %
LYMPHOCYTES NFR BLD MANUAL: 16 %
MCH RBC QN AUTO: 32.1 PG (ref 26.5–33)
MCHC RBC AUTO-ENTMCNC: 34.2 G/DL (ref 31.5–36.5)
MCV RBC AUTO: 94 FL (ref 78–100)
MONOCYTES # BLD AUTO: ABNORMAL 10*3/UL
MONOCYTES # BLD MANUAL: 0.5 10E3/UL (ref 0–1.3)
MONOCYTES NFR BLD AUTO: ABNORMAL %
MONOCYTES NFR BLD MANUAL: 10 %
NEUTROPHILS # BLD AUTO: ABNORMAL 10*3/UL
NEUTROPHILS # BLD MANUAL: 3.6 10E3/UL (ref 1.6–8.3)
NEUTROPHILS NFR BLD AUTO: ABNORMAL %
NEUTROPHILS NFR BLD MANUAL: 71 %
NRBC # BLD AUTO: 0 10E3/UL
NRBC BLD AUTO-RTO: 0 /100
PLAT MORPH BLD: ABNORMAL
PLATELET # BLD AUTO: 242 10E3/UL (ref 150–450)
POTASSIUM SERPL-SCNC: 4.2 MMOL/L (ref 3.4–5.3)
PROT SERPL-MCNC: 6.3 G/DL (ref 6.4–8.3)
RBC # BLD AUTO: 4.27 10E6/UL (ref 4.4–5.9)
RBC MORPH BLD: ABNORMAL
SODIUM SERPL-SCNC: 138 MMOL/L (ref 135–145)
WBC # BLD AUTO: 5.1 10E3/UL (ref 4–11)

## 2024-05-28 PROCEDURE — 82040 ASSAY OF SERUM ALBUMIN: CPT

## 2024-05-28 PROCEDURE — 36522 PHOTOPHERESIS: CPT

## 2024-05-28 PROCEDURE — G2211 COMPLEX E/M VISIT ADD ON: HCPCS | Performed by: STUDENT IN AN ORGANIZED HEALTH CARE EDUCATION/TRAINING PROGRAM

## 2024-05-28 PROCEDURE — 250N000011 HC RX IP 250 OP 636: Performed by: PATHOLOGY

## 2024-05-28 PROCEDURE — 85007 BL SMEAR W/DIFF WBC COUNT: CPT

## 2024-05-28 PROCEDURE — 85018 HEMOGLOBIN: CPT

## 2024-05-28 PROCEDURE — 36415 COLL VENOUS BLD VENIPUNCTURE: CPT

## 2024-05-28 PROCEDURE — 99215 OFFICE O/P EST HI 40 MIN: CPT | Performed by: STUDENT IN AN ORGANIZED HEALTH CARE EDUCATION/TRAINING PROGRAM

## 2024-05-28 PROCEDURE — 250N000009 HC RX 250: Performed by: PATHOLOGY

## 2024-05-28 RX ORDER — TAZAROTENE 1 MG/G
CREAM TOPICAL
Qty: 60 G | Refills: 3 | Status: SHIPPED | OUTPATIENT
Start: 2024-05-28 | End: 2024-05-29

## 2024-05-28 RX ORDER — CLOBETASOL PROPIONATE 0.5 MG/G
OINTMENT TOPICAL 2 TIMES DAILY
Qty: 60 G | Refills: 3 | Status: SHIPPED | OUTPATIENT
Start: 2024-05-28

## 2024-05-28 RX ADMIN — ANTICOAGULANT CITRATE DEXTROSE SOLUTION FORMULA A 150 ML: 12.25; 11; 3.65 SOLUTION INTRAVENOUS at 12:49

## 2024-05-28 RX ADMIN — HEPARIN SODIUM 10 ML: 1000 INJECTION INTRAVENOUS; SUBCUTANEOUS at 12:49

## 2024-05-28 NOTE — PROGRESS NOTES
[facility] Dermatology Note    Encounter Date: May 28, 2024    Dermatology Problem List:  #MF stage IIIB  -Erythrodermic, flow with 500 Sezary cells, CT scan with 9 mm right axillary lymph node    CT scan 02/01/24  IMPRESSION:   1. Prominent, rounded but not pathologically enlarged RIGHT axillary lymph nodes indeterminate for lymphomatous involvement.   2.  No additional pathologically enlarged lymph nodes within the chest, abdomen or pelvis.   3.  Hepatic steatosis.   4.  Multiple hepatic cysts with additional hypodensities, too small to characterize.   5.  Bilateral renal cyst with additional hypodensities, too small to characterize.   6.  3-4 mm RIGHT lung pulmonary nodules, indeterminate.   7.  Small hiatal hernia.   8.  Enlarged main pulmonary artery can be seen with pulmonary arterial hypertension.   9.  Enlarged, heterogenous prostate.     -Treatment considerations include distance traveled, 300 miles from the [place]  - 02/27/2024 discussed treatment options plan to proceed with ECP in agreement with oncologist, start methotrexate 25 mg weekly with folic acid, assess response in 3 months keeping in mind that ECP has maximum results sometimes up to 6 months.  Continue home light boost therapy, not titrating up dose recommended finding the instruction manual for his light unit and titrating up as directed. Start bleach baths  - 04/28/24 enlarged but still nominally normal LN in axilla, new inguinal LAD  - 05/01/24 Eval'd by onc, enlarged LN noted discussed careful monitoring w/ consideration of future imaging and bx  - 05/28/24 after discussion w/ pt they would prefer US of lN for size/structure eval. IR referral for bx L LN. Consider transition from methotrexate to bexarotene or IFN. Updated Onc.     ______________________________________    Impression/Plan:  Blane was seen today for autoimmune disease.    Diagnoses and all orders for this visit:    Encounter for long-term (current) use  of high-risk medication  -     CBC with platelets differential; Standing  -     Comprehensive metabolic panel; Standing    #Mycosis fungoides involving extranodal site excluding spleen and other solid organs (H) (chronic illness that poses risk to life, medication requiring intensive monitoring for toxicity)(Continuing focal point for medical care services related to serious/complex condition)  Recently met with oncologist  on May1st, no major worsening but pt also denies sig improvements in pruritus. Notes worsening of palms which is very bothersome. Frequently picks at the hyperkeratosis Last CT 4/26 showing new inguinal lymphadenopathy and enlarging but normal axillary LAD. Onc physical exam w/ borderline enlarged LN axillae. After discussion w/ pt of options. Will complete ultrasound and IR biopsy of nodules and lymphadenopathy  -Continue PCP methotrexate as patient has noted some improvement in the overall redness of the skin and irritation of his scalp, discussed that there may be additional improvement on the table with continued ECP, future treatments if we do not succeed in obtaining additional improvement might be transition to bexarotene plus or minus the addition of interferon.  Additionally pending the results of the lymph node biopsy he be a candidate for systemic therapy such as an H DAC inhibitor or brentuximab    -     Adult Dermatology  Referral  -     methotrexate 2.5 MG tablet; Take 10 tablets (25 mg) by mouth every 7 days . Take the same day of the week.  -     folic acid (FOLVITE) 1 MG tablet; Take 1 tablet (1 mg) by mouth daily , except on days that you take methotrexate  -     Start Clobetasol (TEMOVATE) 0.05 % external ointment BID to hyperkeratotic hands  -  Start Tazarotene (TAZORAC) 0.1 % external cream BID to hyperkeratotic hands  - Continue Triamcinolone PRN on body for pruritus  - Continue Hydroxyzine 50mg nightly for pruritus  - Continue and increase bleach baths  to 3x/week to help lower levels of any colonizing staph bacteria which may be present.  Exotoxins produced by staph bacteria have been shown experimentally to directly stimulate neoplastic T cells  -  repeat CT chest abdomen on 8/26/24  - Ordered IR biopsy for lymphadenopathy on L axilla  -  Ordered US Soft Tissue Chest Wall or Upper Back US Soft Tissue Pelvic Region  -Recheck in 3 months    Psoriasis  - red scaly skin  - tazorac daily    Other orders  -     Adult Dermatology Clinic Follow-Up Order (Blank); Future        Follow-up in 3 mo .       Staff and Medical Student involved:    Jt Mosqueda, MS4  Ezra Rico MD   of Dermatology  Department of Dermatology  [facility] [employer]      CC:   No chief complaint on file.      History of Present Illness:  Mr. 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 Rico.      Since last visit with dermatology, patient noted nodules found on bilateral elbows since elbow. Non painful.  Also swelling in axillas and inguinal region consistent with lymphadenopathy. Patient is concerned about skin peeling and rough texture of hands that cause pruritus and pain when peeling.      CT chest abdomen/chest 4/26 New Bilateral inguinal lymphadenopathy   Flow Cytometry 5/1: (improved from last flow cytometry results): CD4 positive T cells negative for CD7 comprise 78% of lymphocytes and are monotypic negative  for TCRcB1 (values above 40% and monotypic TCRcB1 may indicate circulating CTCL)..   17% T cells with a CD4:CD8 ratio of 42:1    Current Medication Regimen:  methotrexate 3/2/2024 w/ folic acid. Last labs were 5/1/24  extracorporeal photopheresis 1 treatment per month  bleach baths 2 times per week  and home light boost therapy Held do due concern of skin peeling but has been approved to continue again (completed 4 treatments  at this time) with schedule of 3x per week  For pruritus: Hydroxyzine 50mg nightly Zyrtec 10mg morning, triamcinolone 2x per week       No significant weight lose noted, no night sweats, no fever  Patient notes Runny nose comes out clear, watere eyes, itchy eyes no known allergies    Labs:      Physical exam:  Vitals: There were no vitals taken for this visit.  GEN: well developed, well-nourished, in no acute distress, in a pleasant mood.     SKIN: Beasley phototype 1  - Full skin, which includes the head/face, both arms, chest, back, abdomen,both legs, genitalia and/or groin buttocks, digits and/or nails, was examined.  -Around 80% body surface area with erythematous violacious changes desquamating patches throughout body with scale on lower back,   Keratoderma, hyperkeratosis, and scale on palmar side of hands,       - No other lesions of concern on areas examined.     Past Medical History:   No past medical history on file.  No past surgical history on file.    Social History:   reports that he has never smoked. He has never used smokeless tobacco.    Family History:  No family history on file.    Medications:  Current Outpatient Medications   Medication Sig Dispense Refill    acetaminophen (TYLENOL) 325 MG tablet Take 650 mg by mouth      cetirizine (ZYRTEC) 10 MG tablet Take 10 mg by mouth      clobetasol propionate (CLOBEX) 0.05 % external shampoo       folic acid (FOLVITE) 1 MG tablet Take 1 tablet (1 mg) by mouth daily , except on days that you take methotrexate 26 tablet 11    hydrOXYzine HCl (ATARAX) 25 MG tablet Take 1-2 tablets (25-50 mg) by mouth 3 times daily as needed for itching 60 tablet 2    methotrexate 2.5 MG tablet Take 10 tablets (25 mg) by mouth every 7 days . Take the same day of the week. 40 tablet 3    omeprazole (PRILOSEC) 10 MG DR capsule Take 20 mg by mouth daily      pseudoePHEDrine (SUDAFED) 30 MG tablet Take 30 mg by mouth      triamcinolone (KENALOG) 0.1 % external cream APPLY TO  AFFECTED AREA 2 TIMES A DAY FOR 10 DAYS TO ARMS, CHEST, NECK, AND BACK       No Known Allergies

## 2024-05-28 NOTE — PROGRESS NOTES
Dermatology Rooming Note    Blane Ugalde's goals for this visit include:   Chief Complaint   Patient presents with    Autoimmune Disease     Follow up for mycosis fungoides, currently doing photopheresis.      Anthony Cano, EMT-B

## 2024-05-28 NOTE — LETTER
5/28/2024       RE: Blane Ugalde  [address]  [facility] [address]     Dear Colleague,    Thank you for referring your patient, Blane Ugalde, to the [facility]ERMAT[facility] [place] at [facility]. Please see a copy of my visit note below.    [facility] Dermatology Note    Encounter Date: May 28, 2024    Dermatology Problem List:  #MF stage IIIB  -Erythrodermic, flow with 500 Sezary cells, CT scan with 9 mm right axillary lymph node    CT scan 02/01/24  IMPRESSION:   1. Prominent, rounded but not pathologically enlarged RIGHT axillary lymph nodes indeterminate for lymphomatous involvement.   2.  No additional pathologically enlarged lymph nodes within the chest, abdomen or pelvis.   3.  Hepatic steatosis.   4.  Multiple hepatic cysts with additional hypodensities, too small to characterize.   5.  Bilateral renal cyst with additional hypodensities, too small to characterize.   6.  3-4 mm RIGHT lung pulmonary nodules, indeterminate.   7.  Small hiatal hernia.   8.  Enlarged main pulmonary artery can be seen with pulmonary arterial hypertension.   9.  Enlarged, heterogenous prostate.     -Treatment considerations include distance traveled, 300 miles from the [place]  - 02/27/2024 discussed treatment options plan to proceed with ECP in agreement with oncologist, start methotrexate 25 mg weekly with folic acid, assess response in 3 months keeping in mind that ECP has maximum results sometimes up to 6 months.  Continue home light boost therapy, not titrating up dose recommended finding the instruction manual for his light unit and titrating up as directed. Start bleach baths  - 04/28/24 enlarged but still nominally normal LN in axilla, new inguinal LAD  - 05/01/24 Eval'd by onc, enlarged LN noted discussed careful monitoring w/ consideration of future imaging and bx  - 05/28/24 after discussion w/ pt they would prefer US of lN  for size/structure eval. IR referral for bx L LN. Consider transition from methotrexate to bexarotene or IFN. Updated Onc.     ______________________________________    Impression/Plan:  Blane was seen today for autoimmune disease.    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 Notes worsening of palms which is very bothersome. Frequently picks at the hyperkeratosis Last CT 4/26 showing new inguinal lymphadenopathy and enlarging but normal axillary LAD. Onc physical exam w/ borderline enlarged LN axillae. After discussion w/ pt of options. Will complete ultrasound and IR biopsy of nodules and lymphadenopathy  -Continue PCP methotrexate as patient has noted some improvement in the overall redness of the skin and irritation of his scalp, discussed that there may be additional improvement on the table with continued ECP, future treatments if we do not succeed in obtaining additional improvement might be transition to bexarotene plus or minus the addition of interferon.  Additionally pending the results of the lymph node biopsy he be a candidate for systemic therapy such as an H DAC inhibitor or brentuximab    -     Adult Dermatology  Referral  -     methotrexate 2.5 MG tablet; Take 10 tablets (25 mg) by mouth every 7 days . Take the same day of the week.  -     folic acid (FOLVITE) 1 MG tablet; Take 1 tablet (1 mg) by mouth daily , except on days that you take  methotrexate  -     Start Clobetasol (TEMOVATE) 0.05 % external ointment BID to hyperkeratotic hands  -  Start Tazarotene (TAZORAC) 0.1 % external cream BID to hyperkeratotic hands  - Continue Triamcinolone PRN on body for pruritus  - Continue Hydroxyzine 50mg nightly for pruritus  - Continue and increase bleach baths to 3x/week to help lower levels of any colonizing staph bacteria which may be present.  Exotoxins produced by staph bacteria have been shown experimentally to directly stimulate neoplastic T cells  -  repeat CT chest abdomen on 8/26/24  - Ordered IR biopsy for lymphadenopathy on L axilla  -  Ordered US Soft Tissue Chest Wall or Upper Back US Soft Tissue Pelvic Region  -Recheck in 3 months      Other orders  -     Adult Dermatology Clinic Follow-Up Order (Blank); Future        Follow-up in 3 mo .       Staff and Medical Student involved:    Jt Mosqueda, MS4  Ezra Rico MD   of Dermatology  Department of Dermatology  [employer] [employer]      CC:   No chief complaint on file.      History of Present Illness:  Mr. 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 Rico.      Since last visit with dermatology, patient noted nodules found on bilateral elbows since elbow. Non painful.  Also swelling in axillas and inguinal region consistent with lymphadenopathy. Patient is concerned about skin peeling and rough texture of hands that cause pruritus and pain when peeling.      CT chest abdomen/chest 4/26 New Bilateral inguinal lymphadenopathy   Flow Cytometry 5/1: (improved from last flow cytometry results): CD4 positive T cells negative for CD7 comprise 78% of lymphocytes and are monotypic negative  for TCRcB1 (values above 40% and monotypic TCRcB1 may indicate circulating CTCL)..   17% T cells with a CD4:CD8 ratio of 42:1    Current Medication  Regimen:  methotrexate 3/2/2024 w/ folic acid. Last labs were 5/1/24  extracorporeal photopheresis 1 treatment per month  bleach baths 2 times per week  and home light boost therapy Held do due concern of skin peeling but has been approved to continue again (completed 4 treatments at this time) with schedule of 3x per week  For pruritus: Hydroxyzine 50mg nightly Zyrtec 10mg morning, triamcinolone 2x per week       No significant weight lose noted, no night sweats, no fever  Patient notes Runny nose comes out clear, watere eyes, itchy eyes no known allergies    Labs:      Physical exam:  Vitals: There were no vitals taken for this visit.  GEN: well developed, well-nourished, in no acute distress, in a pleasant mood.     SKIN: Beasley phototype 1  - Full skin, which includes the head/face, both arms, chest, back, abdomen,both legs, genitalia and/or groin buttocks, digits and/or nails, was examined.  -Around 80% body surface area with erythematous violacious changes desquamating patches throughout body with scale on lower back,   Keratoderma, hyperkeratosis, and scale on palmar side of hands,       - No other lesions of concern on areas examined.     Past Medical History:   No past medical history on file.  No past surgical history on file.    Social History:   reports that he has never smoked. He has never used smokeless tobacco.    Family History:  No family history on file.    Medications:  Current Outpatient Medications   Medication Sig Dispense Refill    acetaminophen (TYLENOL) 325 MG tablet Take 650 mg by mouth      cetirizine (ZYRTEC) 10 MG tablet Take 10 mg by mouth      clobetasol propionate (CLOBEX) 0.05 % external shampoo       folic acid (FOLVITE) 1 MG tablet Take 1 tablet (1 mg) by mouth daily , except on days that you take methotrexate 26 tablet 11    hydrOXYzine HCl (ATARAX) 25 MG tablet Take 1-2 tablets (25-50 mg) by mouth 3 times daily as needed for itching 60 tablet 2    methotrexate 2.5 MG tablet  Take 10 tablets (25 mg) by mouth every 7 days . Take the same day of the week. 40 tablet 3    omeprazole (PRILOSEC) 10 MG DR capsule Take 20 mg by mouth daily      pseudoePHEDrine (SUDAFED) 30 MG tablet Take 30 mg by mouth      triamcinolone (KENALOG) 0.1 % external cream APPLY TO AFFECTED AREA 2 TIMES A DAY FOR 10 DAYS TO ARMS, CHEST, NECK, AND BACK       No Known Allergies                Attestation with edits by Ezra Rico MD at 5/28/2024  2:06 PM:  I have personally examined this patient and agree with the medical student's documentation and plan of care. I have reviewed and amended the medical student's note as necessary.The documentation accurately reflects my clinical observations, diagnoses, treatment and follow-up plans.     Ezra Rico MD  Dermatology Staff      Dermatology Rooming Note    Blane MATTHEW Tram's goals for this visit include:   Chief Complaint   Patient presents with    Autoimmune Disease     Follow up for mycosis fungoides, currently doing photopheresis.      Anthony Cano, EMT-B

## 2024-05-28 NOTE — TELEPHONE ENCOUNTER
[facility] to schedule appointment, clinic number provided to call back. Needs Return T-Cell scheduled for early August with AICHA Landry

## 2024-05-28 NOTE — PATIENT INSTRUCTIONS
Use clobetasol twice daily during the day for hands and feet. Can also apply to bothersome areas on the body    Use tazorac to hands and feet only, use at night.     If you don't receive a call to be scheduled for your imaging appointment within 48 hours, please call this number to initiate the scheduling process: [phone]

## 2024-05-28 NOTE — PROCEDURES
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  He is tolerating the procedure well.  He notes overall feeling well today.  Denies nausea, vomiting, fevers, chills. No significant changes to his skin at this point.      His next scheduled procedure is tomorrow.  Continue with plan per his clinical team.             History of Present Illness   Blane Ugalde is a 63 year old male with an h/o hyperlipidemia. a prothrombin gene mutation.  cutaneous T-cell lymphoma (CTCL)/Sézary syndrome. To summarize his course, he had a skin rash involving the arms that waxed an waned since about 2011. It progressed to involve his torso. Rash was raised, red/purple in color, and intermittently itched and basically did not improve with topical creams.  2 skin biopsies in 2021 were non-diagnostic, but one performed 01/2024 was felt to be most consistent with MF.   ECP treatment was started in March 2024.     The plan is to perform 2 procedures per month on two consecutive days       Past Medical History:   No past medical history on file.  CTCL          Past Surgical History:   No past surgical history on file.           Allergies:   No Known Allergies          Medications:     Current Outpatient Medications   Medication Sig Dispense Refill    acetaminophen (TYLENOL) 325 MG tablet Take 650 mg by mouth      cetirizine (ZYRTEC) 10 MG tablet Take 10 mg by mouth      clobetasol (TEMOVATE) 0.05 % external ointment Apply topically 2 times daily 60 g 3    clobetasol propionate (CLOBEX) 0.05 % external shampoo        folic acid (FOLVITE) 1 MG tablet Take 1 tablet (1 mg) by mouth daily , except on days that you take methotrexate 26 tablet 11    hydrOXYzine HCl (ATARAX) 25 MG tablet Take 1-2 tablets (25-50 mg) by mouth 3 times daily as needed for itching 60 tablet 2    methotrexate 2.5 MG tablet Take 10 tablets (25 mg) by mouth every 7 days . Take the same day of the week. 40 tablet 3    omeprazole (PRILOSEC) 10 MG DR capsule Take 20 mg by mouth daily      pseudoePHEDrine (SUDAFED) 30 MG tablet Take 30 mg by mouth (Patient not taking: Reported on 5/28/2024)      tazarotene (TAZORAC) 0.1 % external cream Apply to areas of thick scaling on hands and feet 60 g 3    triamcinolone (KENALOG) 0.1 % external cream APPLY TO AFFECTED AREA 2 TIMES A DAY FOR 10 DAYS TO ARMS, CHEST, NECK, AND BACK                 Exam:   /73   Pulse 57   Temp 97.5  F (36.4  C) (Oral)   Resp 16   Wt 79.7 kg (175 lb 11.3 oz)   BMI 29.24 kg/m    Alert, no apparent distress  Breathing appears comfortable on room air.  Peripheral IV access for the procedure.         Laboratory Data:     BMP  Recent Labs   Lab 05/28/24  1249      POTASSIUM 4.2   CHLORIDE 102   TASHA 9.1   CO2 26   BUN 13.1   CR 0.87   GLC 94     CBC  Recent Labs   Lab 05/28/24  1249   WBC 5.1   RBC 4.27*   HGB 13.7   HCT 40.1   MCV 94   MCH 32.1   MCHC 34.2   RDW 13.6                     Procedure Summary:   Extracorporeal photopheresis was performed on a [employer] device. Peripheral IV was used for access. Heparinized saline was used to prime the circuit and ACD-A was used during the procedure for anticoagulation.  The patient's vital signs were stable throughout.  The patient tolerated the procedure well without complication.  See apheresis flowsheet for additional details.       Attestation: During the procedure the patient was directly seen and evaluated by me, Alexa Alford MD.  I have reviewed the chart and pertinent laboratory findings, and discussed the patient  and the current procedure with the Apheresis nursing staff.    Alexa Alford MD  Transfusion Medicine Attending  Laboratory Medicine & Pathology

## 2024-05-28 NOTE — PROGRESS NOTES
Outpatient IR Referral  05/28/24    Referring Provider: Ezra Rico MD   IR Referral Request: Left axillary lymph node biopsy   Recommendations/Plan:  US guided biopsy of left axillary Lymph node (superior node which is larger).  See 4/26/2024 CT  series 6 image 48.  Confirmed with Dr. Rico that he wants the larger lymph node for biopsy.      Surg and leukemia lymphoma path entered under referrer.      Case and imaging was reviewed with Dr. Caroline Lion MD.  IR recommendations also relayed Epic messaging.    IR referral converted to procedure order.      Brief History:    Blane Ugalde is a 63 year old male with history of with advanced stage mycosis fungoides and lymph node involvement with multiple lymphadenopathy on recent CT.    Pertinent Medications:    Current Outpatient Medications   Medication Sig Dispense Refill    acetaminophen (TYLENOL) 325 MG tablet Take 650 mg by mouth      cetirizine (ZYRTEC) 10 MG tablet Take 10 mg by mouth      clobetasol (TEMOVATE) 0.05 % external ointment Apply topically 2 times daily 60 g 3    clobetasol propionate (CLOBEX) 0.05 % external shampoo       folic acid (FOLVITE) 1 MG tablet Take 1 tablet (1 mg) by mouth daily , except on days that you take methotrexate 26 tablet 11    hydrOXYzine HCl (ATARAX) 25 MG tablet Take 1-2 tablets (25-50 mg) by mouth 3 times daily as needed for itching 60 tablet 2    methotrexate 2.5 MG tablet Take 10 tablets (25 mg) by mouth every 7 days . Take the same day of the week. 40 tablet 3    omeprazole (PRILOSEC) 10 MG DR capsule Take 20 mg by mouth daily      pseudoePHEDrine (SUDAFED) 30 MG tablet Take 30 mg by mouth (Patient not taking: Reported on 5/28/2024)      tazarotene (TAZORAC) 0.1 % external cream Apply to areas of thick scaling on hands and feet 60 g 3    triamcinolone (KENALOG) 0.1 % external cream APPLY TO AFFECTED AREA 2 TIMES A DAY FOR 10 DAYS TO ARMS, CHEST, NECK, AND BACK       No current facility-administered medications  "for this visit.     Facility-Administered Medications Ordered in Other Visits   Medication Dose Route Frequency Provider Last Rate Last Admin    Anticoagulant Citrate Dextrose Formula A at ratio of 1:10 with blood (Apheresis Center)   Apheresis During Apheresis (from stock) Rei Pollock MD        Heparinized Saline to be used in [facility] for Prime  10 mL Apheresis During Apheresis (from stock) Rei Pollock MD        methoxsalen (photopheresis) SOLN   Apheresis DOES NOT GO TO MAR Rei Pollock MD           Pertinent Imaging Reviewed:    4/26/2024:  CT scan       Most Recent Labs:  Lab Results   Component Value Date    WBC 5.1 05/01/2024     Lab Results   Component Value Date    RBC 4.62 05/01/2024     Lab Results   Component Value Date    HGB 14.7 05/01/2024     Lab Results   Component Value Date    HCT 43.3 05/01/2024     Lab Results   Component Value Date     05/01/2024    Last Comprehensive Metabolic Panel:  Lab Results   Component Value Date     05/01/2024    POTASSIUM 4.3 05/01/2024    CHLORIDE 104 05/01/2024    CO2 26 05/01/2024    ANIONGAP 9 05/01/2024    GLC 65 (L) 05/01/2024    BUN 12.3 05/01/2024    CR 0.95 05/01/2024    GFRESTIMATED 89 05/01/2024    TASHA 9.4 05/01/2024      No results found for: \"INR\"       If requesting team would like sample sent for anything else please use the IR order set \"IR RAD Biopsy or Fluid Aspirate Specimens\" to select your necessary diagnostic labs and pend for admission.     If there are labs you desire that are not found in this order set or you have questions regarding specific diagnostic labs please call the associated lab personnel.       Anjana Everett PA-C  Interventional Radiology   [phone] (IR RN triage)    "

## 2024-05-28 NOTE — TELEPHONE ENCOUNTER
----- Message from Ezra Rico MD sent at 5/28/2024 12:59 PM CDT -----  Can you get him scheduled for early August please? Ok to do 12:45 slot if necessary

## 2024-05-29 ENCOUNTER — TELEPHONE (OUTPATIENT)
Dept: DERMATOLOGY | Facility: CLINIC | Age: 64
End: 2024-05-29

## 2024-05-29 ENCOUNTER — TELEPHONE (OUTPATIENT)
Dept: DERMATOLOGY | Facility: CLINIC | Age: 64
End: 2024-05-29
Payer: COMMERCIAL

## 2024-05-29 ENCOUNTER — HOSPITAL ENCOUNTER (OUTPATIENT)
Dept: LAB | Facility: CLINIC | Age: 64
Discharge: HOME | End: 2024-05-29
Attending: DERMATOLOGY | Admitting: PATHOLOGY
Payer: COMMERCIAL

## 2024-05-29 VITALS
HEART RATE: 52 BPM | DIASTOLIC BLOOD PRESSURE: 66 MMHG | SYSTOLIC BLOOD PRESSURE: 99 MMHG | TEMPERATURE: 97.9 F | RESPIRATION RATE: 16 BRPM

## 2024-05-29 DIAGNOSIS — C84.00 MYCOSIS FUNGOIDES, UNSPECIFIED BODY REGION (H): ICD-10-CM

## 2024-05-29 PROCEDURE — 36522 PHOTOPHERESIS: CPT

## 2024-05-29 PROCEDURE — 250N000011 HC RX IP 250 OP 636

## 2024-05-29 PROCEDURE — 250N000009 HC RX 250

## 2024-05-29 RX ORDER — TAZAROTENE 1 MG/G
CREAM TOPICAL
Qty: 60 G | Refills: 3 | Status: SHIPPED | OUTPATIENT
Start: 2024-05-29 | End: 2024-05-30

## 2024-05-29 RX ADMIN — ANTICOAGULANT CITRATE DEXTROSE SOLUTION FORMULA A 154 ML: 12.25; 11; 3.65 SOLUTION INTRAVENOUS at 08:32

## 2024-05-29 RX ADMIN — HEPARIN SODIUM 10 ML: 1000 INJECTION INTRAVENOUS; SUBCUTANEOUS at 08:32

## 2024-05-29 NOTE — DISCHARGE INSTRUCTIONS
Photopheresis:  Avoid sunlight , and wear UVA-protective, full coverage sunglasses and sunscreen SPF 15 or higher  for 24 hours after your treatment.  The drug used in your treatment makes patients more sensitive to sunlight for about 24 hours after the treatment.  Apheresis Blood Donor Center Post Instructions  You may feel tired after your procedure today.   Please call your doctor if you have:  bleeding that doesn t stop, fever, pain where a needle or tube (catheter) was placed, seizures, trouble breathing, red urine, nausea or vomiting, other health concerns.     If your symptoms are severe, call 911.  If your veins were used, keep the bandages on for 2-4 hours.  Avoid heavy lifting with your arms.  If bleeding occurs from these sites, apply firm pressure for 5-10 minutes.  Call your physician if bleeding continues.    The Apheresis/Blood Donor Center is open Monday-Friday 7:30 a.m. to 5 p.m.  The phone number is [phone].  A Transfusion Medicine physician can be reached after 5:00 p.m. weekdays and on weekends /Holidays by calling [phone], and asking for the physician on call.

## 2024-05-29 NOTE — PROCEDURES
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  He is tolerating the procedure well.  He notes overall feeling well today.  Denies nausea, vomiting, fevers, chills. No significant changes to his skin at this point.      This is ECP day 2 of 2.  Continue with plan per his clinical team.             History of Present Illness   Blane Ugalde is a 63 year old male with an h/o hyperlipidemia. a prothrombin gene mutation.  cutaneous T-cell lymphoma (CTCL)/Sézary syndrome. To summarize his course, he had a skin rash involving the arms that waxed an waned since about 2011. It progressed to involve his torso. Rash was raised, red/purple in color, and intermittently itched and basically did not improve with topical creams.  2 skin biopsies in 2021 were non-diagnostic, but one performed 01/2024 was felt to be most consistent with MF.   ECP treatment was started in March 2024.     The plan is to perform 2 procedures per month on two consecutive days       Past Medical History:   No past medical history on file.  CTCL          Past Surgical History:   No past surgical history on file.           Allergies:   No Known Allergies          Medications:     Current Outpatient Medications   Medication Sig Dispense Refill    acetaminophen (TYLENOL) 325 MG tablet Take 650 mg by mouth      cetirizine (ZYRTEC) 10 MG tablet Take 10 mg by mouth      clobetasol (TEMOVATE) 0.05 % external ointment Apply topically 2 times daily 60 g 3    clobetasol propionate (CLOBEX) 0.05 % external shampoo       folic acid  (FOLVITE) 1 MG tablet Take 1 tablet (1 mg) by mouth daily , except on days that you take methotrexate 26 tablet 11    hydrOXYzine HCl (ATARAX) 25 MG tablet Take 1-2 tablets (25-50 mg) by mouth 3 times daily as needed for itching 60 tablet 2    methotrexate 2.5 MG tablet Take 10 tablets (25 mg) by mouth every 7 days . Take the same day of the week. 40 tablet 3    omeprazole (PRILOSEC) 10 MG DR capsule Take 20 mg by mouth daily      pseudoePHEDrine (SUDAFED) 30 MG tablet Take 30 mg by mouth (Patient not taking: Reported on 5/28/2024)      tazarotene (TAZORAC) 0.1 % external cream Apply to areas of thick scaling on hands and feet 60 g 3    triamcinolone (KENALOG) 0.1 % external cream APPLY TO AFFECTED AREA 2 TIMES A DAY FOR 10 DAYS TO ARMS, CHEST, NECK, AND BACK                 Exam:   BP 99/66   Pulse 52   Temp 97.9  F (36.6  C) (Oral)   Resp 16   Alert, no apparent distress  Breathing appears comfortable on room air.  Peripheral IV access for the procedure.         Laboratory Data:     BMP  Recent Labs   Lab 05/28/24  1249      POTASSIUM 4.2   CHLORIDE 102   TASHA 9.1   CO2 26   BUN 13.1   CR 0.87   GLC 94     CBC  Recent Labs   Lab 05/28/24  1249   WBC 5.1   RBC 4.27*   HGB 13.7   HCT 40.1   MCV 94   MCH 32.1   MCHC 34.2   RDW 13.6                     Procedure Summary:   Extracorporeal photopheresis was performed on a [employer] device. Peripheral IV was used for access. Heparinized saline was used to prime the circuit and ACD-A was used during the procedure for anticoagulation.  The patient's vital signs were stable throughout.  The patient tolerated the procedure well without complication.  See apheresis flowsheet for additional details.       Attestation: During the procedure the patient was directly seen and evaluated by me, Alexa Alford MD.  I have reviewed the chart and pertinent laboratory findings, and discussed the patient and the current procedure with the Apheresis nursing  staff.    Alexa Alford MD  Transfusion Medicine Attending  Laboratory Medicine & Pathology

## 2024-05-29 NOTE — TELEPHONE ENCOUNTER
tazarotene (TAZORAC) 0.1 % external cream 60 g 3 5/28/2024 -- No   Sig: Apply to areas of thick scaling on hands and feet     Associated Diagnoses    Mycosis fungoides, unspecified body region (H) [C84.00]  - Primary

## 2024-05-29 NOTE — TELEPHONE ENCOUNTER
[facility] Call Center    Phone Message    May a detailed message be left on voicemail: yes     Reason for Call: Medication Question or concern regarding medication   Prescription Clarification  Name of Medication:    Disp Refills Start End BRANDO   tazarotene (TAZORAC) 0.1 % external cream          Prescribing Provider: Dr. Rico   Pharmacy:   [id number] [place], [place] - [address]      What on the order needs clarification? Pharmacist states they need directions clarified, how many times a day should the Rx be applied.       Action Taken: Message routed to:  [facility] Derm    Travel Screening: Not Applicable

## 2024-05-30 DIAGNOSIS — C84.00 MYCOSIS FUNGOIDES, UNSPECIFIED BODY REGION (H): ICD-10-CM

## 2024-05-30 DIAGNOSIS — L40.9 PSORIASIS: Primary | ICD-10-CM

## 2024-05-30 RX ORDER — TAZAROTENE 1 MG/G
CREAM TOPICAL
Qty: 60 G | Refills: 3 | Status: SHIPPED | OUTPATIENT
Start: 2024-05-30

## 2024-05-30 NOTE — TELEPHONE ENCOUNTER
PRIOR AUTHORIZATION DENIED    Medication: TAZAROTENE 0.1 % EX CREA  Insurance Company: [employer] [place] - Phone [phone] Fax [phone]  Denial Date: 5/29/2024  Denial Reason(s): NOT COVERED FOR DX    Appeal Information:     Patient Notified: NO

## 2024-06-05 ENCOUNTER — TELEPHONE (OUTPATIENT)
Dept: DERMATOLOGY | Facility: CLINIC | Age: 64
End: 2024-06-05
Payer: COMMERCIAL

## 2024-06-05 NOTE — TELEPHONE ENCOUNTER
[facility] Call Center    Phone Message    May a detailed message be left on voicemail: yes     Reason for Call: Other: Patient called to see about getting his biopsy scheduled. Please call patient back to discuss. Thanks.     Action Taken: Message routed to:  [facility] Derm    Travel Screening: Not Applicable     Date of Service:

## 2024-06-05 NOTE — TELEPHONE ENCOUNTER
Patient was asking for a lymph node biopsy not a skin biopsy. Clinic phone number given to patient    Mariano NG CMA

## 2024-06-12 DIAGNOSIS — C84.09 MYCOSIS FUNGOIDES INVOLVING EXTRANODAL SITE EXCLUDING SPLEEN AND OTHER SOLID ORGANS (H): ICD-10-CM

## 2024-06-18 NOTE — TELEPHONE ENCOUNTER
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 As a dermatologist specializing in the treatment of lymphoma-associated dermatologic conditions, I have been managing Mr. Ugalde s case closely.    Blane Ugalde presents with a profound case of palmoplantar keratoderma, which has arisen as a debilitating consequence of his underlying mycosis fungoides. This condition manifests as painful hyperkeratotic plaques on the palms and soles, severely limiting his ability to perform daily activities due to pain and functional impairment. The excessive keratinization has led to fissuring and discomfort, significantly impacting his quality of life.    Tazorac, a topical retinoid containing tazarotene, has been selected as a therapeutic option due to its efficacy in reducing hyperkeratosis and promoting desquamation of abnormal keratinocytes. The mechanism of action of tazarotene involves modulation of cell differentiation and proliferation, thereby addressing the pathological hyperkeratosis observed in Blane noriega palmoplantar keratoderma.    I kindly request approval for the prescription of Tazorac for Blane Ugalde under his current medical insurance coverage. This treatment is essential to mitigate his suffering, improve his quality of life, and address the significant functional limitations imposed by his condition. I am available to provide any additional information or discuss Blane noriega case further to facilitate the approval process.    Thank you for your attention to this matter. Your prompt consideration of this request is greatly appreciated.    Sincerely,    Ezra Rico   of Dermatology   [facility]

## 2024-06-19 NOTE — TELEPHONE ENCOUNTER
Medication Appeal Initiation    Medication: TAZAROTENE 0.1 % EX CREA  Appeal Start Date:  6/19/2024  Insurance Company: [employer]  Insurance Phone: [phone]  Insurance Fax: [phone]  Comments: Sent appeal with LMN, chart note and tried/failed meds  [id number]

## 2024-06-20 ENCOUNTER — MYC MEDICAL ADVICE (OUTPATIENT)
Dept: INTERVENTIONAL RADIOLOGY/VASCULAR | Facility: CLINIC | Age: 64
End: 2024-06-20
Payer: COMMERCIAL

## 2024-06-20 NOTE — TELEPHONE ENCOUNTER
MEDICATION APPEAL APPROVED    Medication: TAZAROTENE 0.1 % EX CREA  Authorization Effective Date: 3/21/2024  Authorization Expiration Date: 6/19/2025  Approved Dose/Quantity: 60G PER 30 DAYS  Filling Pharmacy: [id number] - [place] [address] [address] [facility]  Patient Notified: YES AND PHARMACY - Instructed pharmacy to notify patient once order is ready.   Comments:

## 2024-06-21 NOTE — TELEPHONE ENCOUNTER
methotrexate 2.5 MG tablet 40 tablet 3 2/27/2024 -- No   Sig - Route: Take 10 tablets (25 mg) by mouth every 7 days . Take the same day of the week. - Oral     ----------------------  Last Office Visit : 5/28/2024  [facility] [place]     Ezra Rico MD     Future Office visit:     7/30/2024 11:15 AM (15 min)  [place]   Arrive by: 11:00 AM   RETURN T-CELL   [facility]   Ezra Rico MD     ----------------------      Medication not on protocol.

## 2024-06-24 RX ORDER — METHOTREXATE 2.5 MG/1
25 TABLET ORAL
Qty: 40 TABLET | Refills: 5 | Status: SHIPPED | OUTPATIENT
Start: 2024-06-24

## 2024-06-28 RX ORDER — CALCIUM CARBONATE 500 MG/1
500 TABLET, CHEWABLE ORAL
Status: CANCELLED | OUTPATIENT
Start: 2024-06-28

## 2024-07-01 ENCOUNTER — HOSPITAL ENCOUNTER (OUTPATIENT)
Dept: LAB | Facility: CLINIC | Age: 64
Discharge: HOME | End: 2024-07-01
Attending: INTERNAL MEDICINE | Admitting: INTERNAL MEDICINE
Payer: COMMERCIAL

## 2024-07-01 VITALS
TEMPERATURE: 98.5 F | DIASTOLIC BLOOD PRESSURE: 61 MMHG | SYSTOLIC BLOOD PRESSURE: 102 MMHG | RESPIRATION RATE: 18 BRPM | HEART RATE: 60 BPM

## 2024-07-01 LAB
BASOPHILS # BLD AUTO: NORMAL 10*3/UL
BASOPHILS # BLD MANUAL: 0 10E3/UL (ref 0–0.2)
BASOPHILS NFR BLD AUTO: NORMAL %
BASOPHILS NFR BLD MANUAL: 0 %
EOSINOPHIL # BLD AUTO: NORMAL 10*3/UL
EOSINOPHIL # BLD MANUAL: 0 10E3/UL (ref 0–0.7)
EOSINOPHIL NFR BLD AUTO: NORMAL %
EOSINOPHIL NFR BLD MANUAL: 1 %
ERYTHROCYTE [DISTWIDTH] IN BLOOD BY AUTOMATED COUNT: 13.5 % (ref 10–15)
HCT VFR BLD AUTO: 41.3 % (ref 40–53)
HGB BLD-MCNC: 14.2 G/DL (ref 13.3–17.7)
IMM GRANULOCYTES # BLD: NORMAL 10*3/UL
IMM GRANULOCYTES NFR BLD: NORMAL %
LYMPHOCYTES # BLD AUTO: NORMAL 10*3/UL
LYMPHOCYTES # BLD MANUAL: 0.8 10E3/UL (ref 0.8–5.3)
LYMPHOCYTES NFR BLD AUTO: NORMAL %
LYMPHOCYTES NFR BLD MANUAL: 17 %
MCH RBC QN AUTO: 32 PG (ref 26.5–33)
MCHC RBC AUTO-ENTMCNC: 34.4 G/DL (ref 31.5–36.5)
MCV RBC AUTO: 93 FL (ref 78–100)
MONOCYTES # BLD AUTO: NORMAL 10*3/UL
MONOCYTES # BLD MANUAL: 0.4 10E3/UL (ref 0–1.3)
MONOCYTES NFR BLD AUTO: NORMAL %
MONOCYTES NFR BLD MANUAL: 8 %
NEUTROPHILS # BLD AUTO: NORMAL 10*3/UL
NEUTROPHILS # BLD MANUAL: 3.4 10E3/UL (ref 1.6–8.3)
NEUTROPHILS NFR BLD AUTO: NORMAL %
NEUTROPHILS NFR BLD MANUAL: 74 %
NRBC # BLD AUTO: 0 10E3/UL
NRBC BLD AUTO-RTO: 0 /100
PLAT MORPH BLD: NORMAL
PLATELET # BLD AUTO: 232 10E3/UL (ref 150–450)
RBC # BLD AUTO: 4.44 10E6/UL (ref 4.4–5.9)
RBC MORPH BLD: NORMAL
WBC # BLD AUTO: 4.6 10E3/UL (ref 4–11)

## 2024-07-01 PROCEDURE — 85041 AUTOMATED RBC COUNT: CPT | Performed by: STUDENT IN AN ORGANIZED HEALTH CARE EDUCATION/TRAINING PROGRAM

## 2024-07-01 PROCEDURE — 36415 COLL VENOUS BLD VENIPUNCTURE: CPT | Performed by: STUDENT IN AN ORGANIZED HEALTH CARE EDUCATION/TRAINING PROGRAM

## 2024-07-01 PROCEDURE — 250N000011 HC RX IP 250 OP 636: Performed by: STUDENT IN AN ORGANIZED HEALTH CARE EDUCATION/TRAINING PROGRAM

## 2024-07-01 PROCEDURE — 250N000009 HC RX 250: Performed by: STUDENT IN AN ORGANIZED HEALTH CARE EDUCATION/TRAINING PROGRAM

## 2024-07-01 PROCEDURE — 36522 PHOTOPHERESIS: CPT

## 2024-07-01 PROCEDURE — 36514 APHERESIS PLASMA: CPT

## 2024-07-01 PROCEDURE — 85007 BL SMEAR W/DIFF WBC COUNT: CPT | Performed by: STUDENT IN AN ORGANIZED HEALTH CARE EDUCATION/TRAINING PROGRAM

## 2024-07-01 RX ORDER — CALCIUM CARBONATE 500 MG/1
500 TABLET, CHEWABLE ORAL
Status: CANCELLED | OUTPATIENT
Start: 2024-07-01

## 2024-07-01 RX ADMIN — HEPARIN SODIUM 10 ML: 1000 INJECTION INTRAVENOUS; SUBCUTANEOUS at 12:45

## 2024-07-01 RX ADMIN — ANTICOAGULANT CITRATE DEXTROSE SOLUTION FORMULA A: 12.25; 11; 3.65 SOLUTION INTRAVENOUS at 13:00

## 2024-07-01 NOTE — DISCHARGE INSTRUCTIONS
Apheresis Blood Donor Center Post Instructions  You may feel tired after your procedure today.  Your vein was used, keep the bandages on for 2-4 hours.  Avoid heavy lifting with your arms.  If bleeding occurs from these sites, apply firm pressure for 5-10 minutes.  Call your physician if bleeding continues.    The Apheresis/Blood Donor Center is open Monday-Friday 7:30 a.m. to 5 p.m.  The phone number is [phone].    Photopheresis:  Avoid sunlight , and wear UVA-protective, full coverage sunglasses and sunscreen SPF 15 or higher  for 24 hours after your treatment.  The drug used in your treatment makes patients more sensitive to sunlight for about 24 hours after the treatment.

## 2024-07-02 ENCOUNTER — APPOINTMENT (OUTPATIENT)
Dept: MEDSURG UNIT | Facility: CLINIC | Age: 64
End: 2024-07-02
Attending: INTERNAL MEDICINE
Payer: COMMERCIAL

## 2024-07-02 ENCOUNTER — APPOINTMENT (OUTPATIENT)
Dept: INTERVENTIONAL RADIOLOGY/VASCULAR | Facility: CLINIC | Age: 64
End: 2024-07-02
Attending: STUDENT IN AN ORGANIZED HEALTH CARE EDUCATION/TRAINING PROGRAM
Payer: COMMERCIAL

## 2024-07-02 ENCOUNTER — HOSPITAL ENCOUNTER (OUTPATIENT)
Facility: CLINIC | Age: 64
Discharge: HOME | End: 2024-07-02
Attending: INTERNAL MEDICINE | Admitting: RADIOLOGY
Payer: COMMERCIAL

## 2024-07-02 ENCOUNTER — HOSPITAL ENCOUNTER (OUTPATIENT)
Dept: LAB | Facility: CLINIC | Age: 64
Discharge: HOME | End: 2024-07-02
Attending: STUDENT IN AN ORGANIZED HEALTH CARE EDUCATION/TRAINING PROGRAM | Admitting: STUDENT IN AN ORGANIZED HEALTH CARE EDUCATION/TRAINING PROGRAM
Payer: COMMERCIAL

## 2024-07-02 VITALS
DIASTOLIC BLOOD PRESSURE: 69 MMHG | SYSTOLIC BLOOD PRESSURE: 107 MMHG | OXYGEN SATURATION: 99 % | HEART RATE: 68 BPM | RESPIRATION RATE: 16 BRPM

## 2024-07-02 VITALS
HEART RATE: 52 BPM | DIASTOLIC BLOOD PRESSURE: 71 MMHG | TEMPERATURE: 97.7 F | RESPIRATION RATE: 16 BRPM | SYSTOLIC BLOOD PRESSURE: 107 MMHG

## 2024-07-02 DIAGNOSIS — C84.00 MYCOSIS FUNGOIDES, UNSPECIFIED BODY REGION (H): ICD-10-CM

## 2024-07-02 DIAGNOSIS — Z79.899 ENCOUNTER FOR LONG-TERM (CURRENT) USE OF HIGH-RISK MEDICATION: ICD-10-CM

## 2024-07-02 LAB
ALBUMIN SERPL BCG-MCNC: 4 G/DL (ref 3.5–5.2)
ALP SERPL-CCNC: 68 U/L (ref 40–150)
ALT SERPL W P-5'-P-CCNC: 18 U/L (ref 0–70)
ANION GAP SERPL CALCULATED.3IONS-SCNC: 10 MMOL/L (ref 7–15)
AST SERPL W P-5'-P-CCNC: 24 U/L (ref 0–45)
BILIRUB SERPL-MCNC: 1 MG/DL
BUN SERPL-MCNC: 9.9 MG/DL (ref 8–23)
CALCIUM SERPL-MCNC: 9.3 MG/DL (ref 8.8–10.2)
CHLORIDE SERPL-SCNC: 103 MMOL/L (ref 98–107)
CREAT SERPL-MCNC: 0.98 MG/DL (ref 0.67–1.17)
DEPRECATED HCO3 PLAS-SCNC: 26 MMOL/L (ref 22–29)
EGFRCR SERPLBLD CKD-EPI 2021: 86 ML/MIN/1.73M2
GLUCOSE SERPL-MCNC: 102 MG/DL (ref 70–99)
INR PPP: 0.9 (ref 0.85–1.15)
POTASSIUM SERPL-SCNC: 4.3 MMOL/L (ref 3.4–5.3)
PROT SERPL-MCNC: 6.3 G/DL (ref 6.4–8.3)
SODIUM SERPL-SCNC: 139 MMOL/L (ref 135–145)

## 2024-07-02 PROCEDURE — 258N000003 HC RX IP 258 OP 636: Performed by: NURSE PRACTITIONER

## 2024-07-02 PROCEDURE — 999N000132 HC STATISTIC PP CARE STAGE 1

## 2024-07-02 PROCEDURE — 250N000011 HC RX IP 250 OP 636

## 2024-07-02 PROCEDURE — 88305 TISSUE EXAM BY PATHOLOGIST: CPT | Mod: 26 | Performed by: PATHOLOGY

## 2024-07-02 PROCEDURE — 88333 PATH CONSLTJ SURG CYTO XM 1: CPT | Mod: 26 | Performed by: PATHOLOGY

## 2024-07-02 PROCEDURE — 88184 FLOWCYTOMETRY/ TC 1 MARKER: CPT | Performed by: STUDENT IN AN ORGANIZED HEALTH CARE EDUCATION/TRAINING PROGRAM

## 2024-07-02 PROCEDURE — 999N000142 HC STATISTIC PROCEDURE PREP ONLY

## 2024-07-02 PROCEDURE — 88341 IMHCHEM/IMCYTCHM EA ADD ANTB: CPT | Mod: 26 | Performed by: PATHOLOGY

## 2024-07-02 PROCEDURE — 88342 IMHCHEM/IMCYTCHM 1ST ANTB: CPT | Mod: 26 | Performed by: PATHOLOGY

## 2024-07-02 PROCEDURE — 88188 FLOWCYTOMETRY/READ 9-15: CPT | Mod: GC | Performed by: STUDENT IN AN ORGANIZED HEALTH CARE EDUCATION/TRAINING PROGRAM

## 2024-07-02 PROCEDURE — 93005 ELECTROCARDIOGRAM TRACING: CPT | Mod: XU

## 2024-07-02 PROCEDURE — 36415 COLL VENOUS BLD VENIPUNCTURE: CPT

## 2024-07-02 PROCEDURE — 85610 PROTHROMBIN TIME: CPT | Performed by: NURSE PRACTITIONER

## 2024-07-02 PROCEDURE — 36415 COLL VENOUS BLD VENIPUNCTURE: CPT | Performed by: NURSE PRACTITIONER

## 2024-07-02 PROCEDURE — 250N000009 HC RX 250: Performed by: PATHOLOGY

## 2024-07-02 PROCEDURE — 38505 NEEDLE BIOPSY LYMPH NODES: CPT | Mod: LT | Performed by: RADIOLOGY

## 2024-07-02 PROCEDURE — 99152 MOD SED SAME PHYS/QHP 5/>YRS: CPT

## 2024-07-02 PROCEDURE — 88333 PATH CONSLTJ SURG CYTO XM 1: CPT | Mod: TC | Performed by: STUDENT IN AN ORGANIZED HEALTH CARE EDUCATION/TRAINING PROGRAM

## 2024-07-02 PROCEDURE — 88342 IMHCHEM/IMCYTCHM 1ST ANTB: CPT | Mod: TC,XU | Performed by: STUDENT IN AN ORGANIZED HEALTH CARE EDUCATION/TRAINING PROGRAM

## 2024-07-02 PROCEDURE — 250N000009 HC RX 250

## 2024-07-02 PROCEDURE — 88185 FLOWCYTOMETRY/TC ADD-ON: CPT | Performed by: STUDENT IN AN ORGANIZED HEALTH CARE EDUCATION/TRAINING PROGRAM

## 2024-07-02 PROCEDURE — 36522 PHOTOPHERESIS: CPT

## 2024-07-02 PROCEDURE — 82040 ASSAY OF SERUM ALBUMIN: CPT

## 2024-07-02 PROCEDURE — 76942 ECHO GUIDE FOR BIOPSY: CPT | Mod: 26 | Performed by: RADIOLOGY

## 2024-07-02 RX ORDER — NALOXONE HYDROCHLORIDE 0.4 MG/ML
0.4 INJECTION, SOLUTION INTRAMUSCULAR; INTRAVENOUS; SUBCUTANEOUS
Status: DISCONTINUED | OUTPATIENT
Start: 2024-07-02 | End: 2024-07-02 | Stop reason: HOSPADM

## 2024-07-02 RX ORDER — NALOXONE HYDROCHLORIDE 0.4 MG/ML
0.2 INJECTION, SOLUTION INTRAMUSCULAR; INTRAVENOUS; SUBCUTANEOUS
Status: DISCONTINUED | OUTPATIENT
Start: 2024-07-02 | End: 2024-07-02 | Stop reason: HOSPADM

## 2024-07-02 RX ORDER — LIDOCAINE 40 MG/G
CREAM TOPICAL
Status: DISCONTINUED | OUTPATIENT
Start: 2024-07-02 | End: 2024-07-02 | Stop reason: HOSPADM

## 2024-07-02 RX ORDER — SODIUM CHLORIDE 9 MG/ML
INJECTION, SOLUTION INTRAVENOUS CONTINUOUS
Status: DISCONTINUED | OUTPATIENT
Start: 2024-07-02 | End: 2024-07-02 | Stop reason: HOSPADM

## 2024-07-02 RX ORDER — FLUMAZENIL 0.1 MG/ML
0.2 INJECTION, SOLUTION INTRAVENOUS
Status: DISCONTINUED | OUTPATIENT
Start: 2024-07-02 | End: 2024-07-02 | Stop reason: HOSPADM

## 2024-07-02 RX ORDER — FENTANYL CITRATE 50 UG/ML
25-50 INJECTION, SOLUTION INTRAMUSCULAR; INTRAVENOUS EVERY 5 MIN PRN
Status: DISCONTINUED | OUTPATIENT
Start: 2024-07-02 | End: 2024-07-02 | Stop reason: HOSPADM

## 2024-07-02 RX ADMIN — ANTICOAGULANT CITRATE DEXTROSE SOLUTION FORMULA A 209 ML: 12.25; 11; 3.65 SOLUTION INTRAVENOUS at 09:19

## 2024-07-02 RX ADMIN — FENTANYL CITRATE 50 MCG: 50 INJECTION, SOLUTION INTRAMUSCULAR; INTRAVENOUS at 13:54

## 2024-07-02 RX ADMIN — MIDAZOLAM 1 MG: 1 INJECTION INTRAMUSCULAR; INTRAVENOUS at 13:54

## 2024-07-02 RX ADMIN — SODIUM CHLORIDE: 0.9 INJECTION, SOLUTION INTRAVENOUS at 12:43

## 2024-07-02 RX ADMIN — Medication: at 09:19

## 2024-07-02 RX ADMIN — LIDOCAINE HYDROCHLORIDE 15 ML: 10 INJECTION, SOLUTION EPIDURAL; INFILTRATION; INTRACAUDAL; PERINEURAL at 13:56

## 2024-07-02 ASSESSMENT — ACTIVITIES OF DAILY LIVING (ADL)
ADLS_ACUITY_SCORE: 35

## 2024-07-02 NOTE — PROGRESS NOTES
Prep for lymph node biopsy. Pt alert and oriented. VSS. Denies pain. Appropriately NPO. Left PIV infusing. INR processing. EKG- Sinus pipe. Wife Irma bedside.

## 2024-07-02 NOTE — PROCEDURES
[facility]    Procedure: IR Procedure Note    Date/Time: 7/2/2024 2:24 PM    Performed by: Margarita Ramos MD  Authorized by: Oral Burnett MD  IR Fellow Physician: Norma Sanchez DO  Radiology Resident Physician: Nakia Ramos MD        UNIVERSAL PROTOCOL   Site Marked: Yes  Prior Images Obtained and Reviewed:  Yes  Required items: Required blood products, implants, devices and special equipment available    Patient identity confirmed:  Verbally with patient and anonymous protocol, patient vented/unresponsive  Patient was reevaluated immediately before administering moderate or deep sedation or anesthesia  Confirmation Checklist:  Patient's identity using two indicators  Time out: Immediately prior to the procedure a time out was called    Universal Protocol: the Joint Commission Universal Protocol was followed    Preparation: Patient was prepped and draped in usual sterile fashion       ANESTHESIA    Local Anesthetic: Lidocaine 1% without epinephrine      SEDATION  Patient Sedated: Yes    Vital signs: Vital signs monitored during sedation    Findings: Left axillary lymph node core needle biopsies x6     Specimens: core needle biopsy specimens sent for pathological analysis    Complications: None    Condition: Stable    Plan: Bedrest 1 hr      PROCEDURE    Length of time physician/provider present for 1:1 monitoring during sedation: 25    Nakia Ramos MD  PGY-3 IR/

## 2024-07-02 NOTE — PROCEDURES
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  He tolerated the procedure well.  He notes overall feeling well today, no changes.  Denies nausea, vomiting, fevers, chills. No significant changes to his skin at this point.       His next scheduled procedure is in about a month.  Continue with plan per his clinical team.  He has a lymph node biopsy scheduled today.              History of Present Illness:   Blane Ugalde is a 63 year old male with an h/o hyperlipidemia. a prothrombin gene mutation.  cutaneous T-cell lymphoma (CTCL)/Sézary syndrome. To summarize his course, he had a skin rash involving the arms that waxed an waned since about 2011. It progressed to involve his torso. Rash was raised, red/purple in color, and intermittently itched and basically did not improve with topical creams.  2 skin biopsies in 2021 were non-diagnostic, but one performed 01/2024 was felt to be most consistent with MF.   ECP treatment was started in March 2024.      The plan is to perform 2 procedures per month on two consecutive days              Past Medical History:   No past medical history on file.  CTCL          Past Surgical History:     Past Surgical History:   Procedure Laterality Date    APPENDECTOMY               Allergies:   No Known Allergies          Medications:     Current Outpatient Medications   Medication Sig Dispense Refill    acetaminophen (TYLENOL) 325 MG tablet Take 650 mg by mouth      cetirizine (ZYRTEC) 10 MG tablet Take 10 mg by mouth      clobetasol (TEMOVATE) 0.05 % external ointment Apply topically 2 times daily 60 g 3    clobetasol propionate (CLOBEX) 0.05 % external shampoo        folic acid (FOLVITE) 1 MG tablet Take 1 tablet (1 mg) by mouth daily , except on days that you take methotrexate 26 tablet 11    hydrOXYzine HCl (ATARAX) 25 MG tablet Take 1-2 tablets (25-50 mg) by mouth 3 times daily as needed for itching 60 tablet 2    methotrexate 2.5 MG tablet Take 10 tablets (25 mg) by mouth every 7 days . Take the same day of the week. 40 tablet 5    omeprazole (PRILOSEC) 10 MG DR capsule Take 20 mg by mouth daily      pseudoePHEDrine (SUDAFED) 30 MG tablet Take 30 mg by mouth (Patient not taking: Reported on 5/28/2024)      tazarotene (TAZORAC) 0.1 % external cream Apply to areas of thick scaling on hands and feet daily 60 g 3    triamcinolone (KENALOG) 0.1 % external cream APPLY TO AFFECTED AREA 2 TIMES A DAY FOR 10 DAYS TO ARMS, CHEST, NECK, AND BACK       Current Facility-Administered Medications   Medication Dose Route Frequency Provider Last Rate Last Admin    methoxsalen (photopheresis) SOLN   Apheresis DOES NOT GO TO Tremayne Peters MD         Facility-Administered Medications Ordered in Other Encounters   Medication Dose Route Frequency Provider Last Rate Last Admin    fentaNYL (PF) (SUBLIMAZE) injection 25-50 mcg  25-50 mcg Intravenous Q5 Min PRN Margarita Ramos MD   50 mcg at 07/02/24 1354    flumazenil (ROMAZICON) injection 0.2 mg  0.2 mg Intravenous q1 min prn Margarita Ramos MD        lidocaine (LMX4) cream   Topical Q1H PRN Stephanie Sosa APRN CNP        lidocaine 1 % 0.1-1 mL  0.1-1 mL Other Q1H PRN Stephanie Sosa APRN CNP        midazolam (VERSED) injection 0.5-2 mg  0.5-2 mg Intravenous Q4 Min PRN Margarita Ramos MD   1 mg at 07/02/24 1354    naloxone (NARCAN) injection 0.2 mg  0.2 mg Intravenous Q2 Min PRN Margarita Ramos MD        Or    naloxone (NARCAN) injection 0.4 mg  0.4 mg Intravenous Q2 Min PRN Margarita Ramos MD        Or    naloxone (NARCAN) injection 0.2 mg  0.2 mg Intramuscular Q2 Min PRN Margarita Ramos MD        Or     naloxone (NARCAN) injection 0.4 mg  0.4 mg Intramuscular Q2 Min PRN Margarita Ramos MD        sodium chloride (PF) 0.9% PF flush 3 mL  3 mL Intracatheter Q8H Stephanie Soas APRN CNP        sodium chloride (PF) 0.9% PF flush 3 mL  3 mL Intracatheter q1 min prn Stephanie Sosa APRN CNP        sodium chloride 0.9 % infusion   Intravenous Continuous Patrizia-Stephanie Umaña APRN CNP 75 mL/hr at 07/02/24 1243 New Bag at 07/02/24 1243             Review of Systems:     See above           Exam:   /71   Pulse 52   Temp 97.7  F (36.5  C) (Oral)   Resp 16   Alert, no apparent distress  Breathing appears comfortable on room air  Peripheral IV access for the procedure.             Data:       BMP  Recent Labs   Lab 07/02/24  0823      POTASSIUM 4.3   CHLORIDE 103   TASHA 9.3   CO2 26   BUN 9.9   CR 0.98   *     CBC  Recent Labs   Lab 07/01/24  1316   WBC 4.6   RBC 4.44   HGB 14.2   HCT 41.3   MCV 93   MCH 32.0   MCHC 34.4   RDW 13.5        INR  Recent Labs   Lab 07/02/24  1234   INR 0.90                  Procedure Summary:   Extracorporeal photopheresis was performed on a [employer] device. Peripheral IV was used for access.   ACD-A/saline was used to prime the circuit and ACD-A was used during the procedure for anticoagulation.  The patient's vital signs were stable throughout.  The patient tolerated the procedure well without complication.  See apheresis flowsheet for additional details.         Attestation: During the procedure the patient was directly seen and evaluated by me, Tremayne Gillis MD.  The medical student, Rylee Yakymi, was also present for the evaluation.  I have reviewed the chart and pertinent laboratory findings, and discussed the patient and the current procedure with the Apheresis nursing staff.    Tremayne Gillis MD  Transfusion Medicine Attending  Laboratory Medicine and Pathology

## 2024-07-02 NOTE — PROGRESS NOTES
S/p left axilla lymph biopsy. Pt alert and oriented. VSS. Left axilla site intact. Denies pain and nausea. Pt eating and drinking. Bedrest x 1 hour

## 2024-07-02 NOTE — DISCHARGE INSTRUCTIONS
Formerly [facility]    Interventional Radiology  Patient Instructions Following Lymph node Biopsy    AFTER YOU GO HOME  If you were given sedation, for the first 24 hours: we recommend that an adult stay with you, DO NOT drive or operate machinery at home or at work, DO NOT smoke, DO NOT make any important or legal decisions.  DO NOT drink alcoholic beverages the day of your procedure  Drink plenty of fluids   Resume your regular diet, unless otherwise instructed by your Primary Physician  Relax and take it easy for 48 hours  DO NOT do any strenuous exercise or lifting (> 10 lbs) for at least 5 days following your procedure  Keep the dressing dry and in place for 24 hours. Replace with Band aid for 2 days.  Never leave a wet dressing in place.  Do not take a shower for at least 12 hours following your procedure. No tub bath, hot tub, or swimming for 5 days.  There should be minimum drainage from the biopsy site    CALL THE PHYSICIAN IF:  You start bleeding from the procedure site.  If you do start to bleed from that site, lie down flat and hold pressure on the site for a minimum of 10 minutes.  Your physician will tell you if you need to return to the hospital  You develop nausea or vomiting  You have excessive swelling, redness, or tenderness at the site  You have drainage that looks like it is infected.  You experience severe pain  You develop hives or a rash or unexplained itching  You develop shortness of breath  You develop chest pain or cough up blood, lightheadedness or fainting      [facility] INTERVENTIONAL RADIOLOGY DEPARTMENT  Procedure Physician:       Date of procedure: July 2, 2024  Telephone Numbers: [phone]      Monday-Friday 7:30 am to 4:00 pm  [phone] After 4:00 pm Monday-Friday, Weekends & Holidays.   Ask for the Interventional Radiologist on call.  Someone is on call 24 hrs/day  [facility] toll free number: [phone] Monday-Friday 8:00 am to 4:30 pm  [facility] Emergency  Dept: [phone]

## 2024-07-02 NOTE — IR NOTE
Patient Name: Blane Ugalde  Medical Record Number: [id number]  Today's Date: 7/2/2024    Procedure: left lymph node biopsy  Proceduralist: VIPUL Ramos and DOMINGUEZ Sanchez  Pathology present: yes    Procedure Start: 1353  Procedure end: 1417  Sedation medications administered:   Fentanyl- 50 mcg  Versed-  1 mg     Report given to:   : n/a    Other Notes: Pt arrived to IR room 6 from . Consent reviewed. Pt denies any questions or concerns regarding procedure. Pt positioned supine and monitored per protocol. Pt tolerated procedure without any noted complications. Pt transferred back to .    4 samples taken with pathology    2 samples for flow

## 2024-07-02 NOTE — PRE-PROCEDURE
GENERAL PRE-PROCEDURE:   Procedure:  Lymph node biopsy  Date/Time:  7/2/2024 1:08 PM    Verbal consent obtained?: Yes    Written consent obtained?: Yes    Risks and benefits: Risks, benefits and alternatives were discussed    Consent given by:  Patient  Patient states understanding of procedure being performed: Yes    Patient's understanding of procedure matches consent: Yes    Procedure consent matches procedure scheduled: Yes    Expected level of sedation:  Moderate  Appropriately NPO:  Yes  Mallampati  :  Grade 2- soft palate, base of uvula, tonsillar pillars, and portion of posterior pharyngeal wall visible  Lungs:  Lungs clear with good breath sounds bilaterally  Heart:  Normal heart sounds and rate  History & Physical reviewed:  History and physical reviewed and no updates needed  Statement of review:  I have reviewed the lab findings, diagnostic data, medications, and the plan for sedation    Nakia Ramos MD  PGY-3 IR/

## 2024-07-02 NOTE — PROGRESS NOTES
Pt is tolerating liquids and foods, ambulating, urinating, puncture site is stable (no bleeding and no hematoma). Pt has a  - wife.  A/O x4 and making needs known.  VSS.  IV access removed.  Discharge education completed and outlined in AVS by previous nurse.  All questions answered and addressed.  Belongings returned to pt at discharge.  Discharged to self care.  [facility] by family.

## 2024-07-02 NOTE — PROCEDURES
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  He is tolerating the procedure well.  He notes overall feeling well today.  Denies nausea, vomiting, fevers, chills. No significant changes to his skin at this point.       His next scheduled procedure is tomorrow.  Continue with plan per his clinical team.              History of Present Illness:   Blane Ugalde is a 63 year old male with an h/o hyperlipidemia. a prothrombin gene mutation.  cutaneous T-cell lymphoma (CTCL)/Sézary syndrome. To summarize his course, he had a skin rash involving the arms that waxed an waned since about 2011. It progressed to involve his torso. Rash was raised, red/purple in color, and intermittently itched and basically did not improve with topical creams.  2 skin biopsies in 2021 were non-diagnostic, but one performed 01/2024 was felt to be most consistent with MF.   ECP treatment was started in March 2024.      The plan is to perform 2 procedures per month on two consecutive days              Past Medical History:   No past medical history on file.  CTCL          Past Surgical History:   No past surgical history on file.          Allergies:   No Known Allergies          Medications:     Current Outpatient Medications   Medication Sig Dispense Refill    acetaminophen (TYLENOL) 325 MG tablet Take 650 mg by mouth      cetirizine (ZYRTEC) 10 MG tablet Take 10 mg by mouth      clobetasol (TEMOVATE) 0.05 % external ointment Apply topically 2 times daily 60 g 3    clobetasol propionate (CLOBEX) 0.05 % external shampoo       folic acid (FOLVITE) 1 MG tablet Take 1 tablet (1 mg) by mouth daily , except on days that you take  methotrexate 26 tablet 11    hydrOXYzine HCl (ATARAX) 25 MG tablet Take 1-2 tablets (25-50 mg) by mouth 3 times daily as needed for itching 60 tablet 2    methotrexate 2.5 MG tablet Take 10 tablets (25 mg) by mouth every 7 days . Take the same day of the week. 40 tablet 5    omeprazole (PRILOSEC) 10 MG DR capsule Take 20 mg by mouth daily      tazarotene (TAZORAC) 0.1 % external cream Apply to areas of thick scaling on hands and feet daily 60 g 3    triamcinolone (KENALOG) 0.1 % external cream APPLY TO AFFECTED AREA 2 TIMES A DAY FOR 10 DAYS TO ARMS, CHEST, NECK, AND BACK      pseudoePHEDrine (SUDAFED) 30 MG tablet Take 30 mg by mouth (Patient not taking: Reported on 5/28/2024)       Current Facility-Administered Medications   Medication Dose Route Frequency Provider Last Rate Last Admin    methoxsalen (photopheresis) SOLN   Apheresis DOES NOT GO TO Rob Williamson MD                 Review of Systems:     See above           Exam:   /61   Pulse 60   Temp 98.5  F (36.9  C) (Oral)   Resp 18   Alert, no apparent distress  Breathing appears comfortable on room air  Peripheral IV access for the procedure.             Data:     Results for orders placed or performed during the hospital encounter of 07/01/24   CBC with platelets and differential     Status: None   Result Value Ref Range    WBC Count 4.6 4.0 - 11.0 10e3/uL    RBC Count 4.44 4.40 - 5.90 10e6/uL    Hemoglobin 14.2 13.3 - 17.7 g/dL    Hematocrit 41.3 40.0 - 53.0 %    MCV 93 78 - 100 fL    MCH 32.0 26.5 - 33.0 pg    MCHC 34.4 31.5 - 36.5 g/dL    RDW 13.5 10.0 - 15.0 %    Platelet Count 232 150 - 450 10e3/uL    % Neutrophils      % Lymphocytes      % Monocytes      % Eosinophils      % Basophils      % Immature Granulocytes      NRBCs per 100 WBC 0 <1 /100    Absolute Neutrophils      Absolute Lymphocytes      Absolute Monocytes      Absolute Eosinophils      Absolute Basophils      Absolute Immature Granulocytes      Absolute NRBCs 0.0 10e3/uL    Manual Differential     Status: None   Result Value Ref Range    % Neutrophils 74 %    % Lymphocytes 17 %    % Monocytes 8 %    % Eosinophils 1 %    % Basophils 0 %    Absolute Neutrophils 3.4 1.6 - 8.3 10e3/uL    Absolute Lymphocytes 0.8 0.8 - 5.3 10e3/uL    Absolute Monocytes 0.4 0.0 - 1.3 10e3/uL    Absolute Eosinophils 0.0 0.0 - 0.7 10e3/uL    Absolute Basophils 0.0 0.0 - 0.2 10e3/uL    RBC Morphology Confirmed RBC Indices     Platelet Assessment  Automated Count Confirmed. Platelet morphology is normal.     Automated Count Confirmed. Platelet morphology is normal.   CBC with platelets differential     Status: None    Narrative    The following orders were created for panel order CBC with platelets differential.  Procedure                               Abnormality         Status                     ---------                               -----------         ------                     CBC with platelets and d...[[id number]]                      Final result               Manual Differential[[id number]]                              Final result                 Please view results for these tests on the individual orders.                Procedure Summary:   Extracorporeal photopheresis was performed on a [employer] device. Peripheral IV was used for access.   Heparinized saline was used to prime the circuit and ACD-A was used during the procedure for anticoagulation.  The patient's vital signs were stable throughout.  The patient tolerated the procedure well without complication.  See apheresis flowsheet for additional details.         Attestation: During the procedure the patient was directly seen and evaluated by me, Tremayne Gillis MD.  The medical student, Rylee Yakymi, was also present for the evaluation.  I have reviewed the chart and pertinent laboratory findings, and discussed the patient and the current procedure with the Apheresis nursing staff.    Tremayne Gillis MD  Transfusion Medicine  Attending  Laboratory Medicine and Pathology

## 2024-07-03 LAB
ATRIAL RATE - MUSE: 58 BPM
DIASTOLIC BLOOD PRESSURE - MUSE: NORMAL MMHG
INTERPRETATION ECG - MUSE: NORMAL
P AXIS - MUSE: 34 DEGREES
PR INTERVAL - MUSE: 134 MS
QRS DURATION - MUSE: 84 MS
QT - MUSE: 444 MS
QTC - MUSE: 435 MS
R AXIS - MUSE: -11 DEGREES
SYSTOLIC BLOOD PRESSURE - MUSE: NORMAL MMHG
T AXIS - MUSE: -2 DEGREES
VENTRICULAR RATE- MUSE: 58 BPM

## 2024-07-04 LAB
PATH REPORT.COMMENTS IMP SPEC: ABNORMAL
PATH REPORT.COMMENTS IMP SPEC: YES
PATH REPORT.FINAL DX SPEC: ABNORMAL
PATH REPORT.MICROSCOPIC SPEC OTHER STN: ABNORMAL
PATH REPORT.RELEVANT HX SPEC: ABNORMAL

## 2024-07-05 LAB
PATH REPORT.COMMENTS IMP SPEC: ABNORMAL
PATH REPORT.COMMENTS IMP SPEC: YES
PATH REPORT.FINAL DX SPEC: ABNORMAL
PATH REPORT.GROSS SPEC: ABNORMAL
PATH REPORT.MICROSCOPIC SPEC OTHER STN: ABNORMAL
PATH REPORT.RELEVANT HX SPEC: ABNORMAL
PHOTO IMAGE: ABNORMAL

## 2024-07-18 ENCOUNTER — TELEPHONE (OUTPATIENT)
Dept: DERMATOLOGY | Facility: CLINIC | Age: 64
End: 2024-07-18
Payer: COMMERCIAL

## 2024-07-18 ENCOUNTER — TELEPHONE (OUTPATIENT)
Dept: ONCOLOGY | Facility: CLINIC | Age: 64
End: 2024-07-18
Payer: COMMERCIAL

## 2024-07-18 NOTE — TELEPHONE ENCOUNTER
1154 Shama RN called requesting  Provider to provider consult request from Dr. Rush Johnson of [facility].     Paged Dr. Everett to call Dr. Johnson.

## 2024-07-18 NOTE — TELEPHONE ENCOUNTER
[facility] Call Center    Phone Message    May a detailed message be left on voicemail: yes     Reason for Call: Irma nurse at cancer center wants to set up appt for doctor to speak with Rico - please call back [phone]    Action Taken: Message routed to:  [facility] Derm    Travel Screening: Not Applicable     Date of Service:

## 2024-07-26 RX ORDER — CALCIUM CARBONATE 500 MG/1
500 TABLET, CHEWABLE ORAL
Status: CANCELLED | OUTPATIENT
Start: 2024-07-26

## 2024-07-26 RX ORDER — HEPARIN SODIUM 1000 [USP'U]/ML
3 INJECTION, SOLUTION INTRAVENOUS; SUBCUTANEOUS ONCE
Status: CANCELLED | OUTPATIENT
Start: 2024-07-26 | End: 2024-07-26

## 2024-07-29 ENCOUNTER — HOSPITAL ENCOUNTER (OUTPATIENT)
Dept: LAB | Facility: CLINIC | Age: 64
Discharge: HOME | End: 2024-07-29
Attending: STUDENT IN AN ORGANIZED HEALTH CARE EDUCATION/TRAINING PROGRAM | Admitting: STUDENT IN AN ORGANIZED HEALTH CARE EDUCATION/TRAINING PROGRAM
Payer: COMMERCIAL

## 2024-07-29 VITALS
DIASTOLIC BLOOD PRESSURE: 65 MMHG | BODY MASS INDEX: 28.14 KG/M2 | WEIGHT: 169.09 LBS | HEART RATE: 60 BPM | TEMPERATURE: 97.5 F | SYSTOLIC BLOOD PRESSURE: 106 MMHG | RESPIRATION RATE: 18 BRPM

## 2024-07-29 DIAGNOSIS — Z79.899 ENCOUNTER FOR LONG-TERM (CURRENT) USE OF HIGH-RISK MEDICATION: ICD-10-CM

## 2024-07-29 LAB
ALBUMIN SERPL BCG-MCNC: 4.2 G/DL (ref 3.5–5.2)
ALP SERPL-CCNC: 70 U/L (ref 40–150)
ALT SERPL W P-5'-P-CCNC: 19 U/L (ref 0–70)
ANION GAP SERPL CALCULATED.3IONS-SCNC: 10 MMOL/L (ref 7–15)
AST SERPL W P-5'-P-CCNC: 27 U/L (ref 0–45)
BASOPHILS # BLD AUTO: ABNORMAL 10*3/UL
BASOPHILS # BLD MANUAL: 0.1 10E3/UL (ref 0–0.2)
BASOPHILS NFR BLD AUTO: ABNORMAL %
BASOPHILS NFR BLD MANUAL: 1 %
BILIRUB SERPL-MCNC: 0.8 MG/DL
BUN SERPL-MCNC: 9.6 MG/DL (ref 8–23)
CALCIUM SERPL-MCNC: 9.5 MG/DL (ref 8.8–10.4)
CHLORIDE SERPL-SCNC: 103 MMOL/L (ref 98–107)
CREAT SERPL-MCNC: 0.8 MG/DL (ref 0.67–1.17)
EGFRCR SERPLBLD CKD-EPI 2021: >90 ML/MIN/1.73M2
EOSINOPHIL # BLD AUTO: ABNORMAL 10*3/UL
EOSINOPHIL # BLD MANUAL: 0 10E3/UL (ref 0–0.7)
EOSINOPHIL NFR BLD AUTO: ABNORMAL %
EOSINOPHIL NFR BLD MANUAL: 0 %
ERYTHROCYTE [DISTWIDTH] IN BLOOD BY AUTOMATED COUNT: 14.2 % (ref 10–15)
GLUCOSE SERPL-MCNC: 89 MG/DL (ref 70–99)
HCO3 SERPL-SCNC: 26 MMOL/L (ref 22–29)
HCT VFR BLD AUTO: 40.9 % (ref 40–53)
HGB BLD-MCNC: 13.6 G/DL (ref 13.3–17.7)
IMM GRANULOCYTES # BLD: ABNORMAL 10*3/UL
IMM GRANULOCYTES NFR BLD: ABNORMAL %
LYMPHOCYTES # BLD AUTO: ABNORMAL 10*3/UL
LYMPHOCYTES # BLD MANUAL: 1.5 10E3/UL (ref 0.8–5.3)
LYMPHOCYTES NFR BLD AUTO: ABNORMAL %
LYMPHOCYTES NFR BLD MANUAL: 26 %
MCH RBC QN AUTO: 31.3 PG (ref 26.5–33)
MCHC RBC AUTO-ENTMCNC: 33.3 G/DL (ref 31.5–36.5)
MCV RBC AUTO: 94 FL (ref 78–100)
MONOCYTES # BLD AUTO: ABNORMAL 10*3/UL
MONOCYTES # BLD MANUAL: 0.5 10E3/UL (ref 0–1.3)
MONOCYTES NFR BLD AUTO: ABNORMAL %
MONOCYTES NFR BLD MANUAL: 9 %
NEUTROPHILS # BLD AUTO: ABNORMAL 10*3/UL
NEUTROPHILS # BLD MANUAL: 3.7 10E3/UL (ref 1.6–8.3)
NEUTROPHILS NFR BLD AUTO: ABNORMAL %
NEUTROPHILS NFR BLD MANUAL: 64 %
NRBC # BLD AUTO: 0 10E3/UL
NRBC BLD AUTO-RTO: 0 /100
PLAT MORPH BLD: NORMAL
PLATELET # BLD AUTO: 284 10E3/UL (ref 150–450)
POTASSIUM SERPL-SCNC: 4.2 MMOL/L (ref 3.4–5.3)
PROT SERPL-MCNC: 6.9 G/DL (ref 6.4–8.3)
RBC # BLD AUTO: 4.34 10E6/UL (ref 4.4–5.9)
RBC MORPH BLD: NORMAL
SODIUM SERPL-SCNC: 139 MMOL/L (ref 135–145)
WBC # BLD AUTO: 5.8 10E3/UL (ref 4–11)

## 2024-07-29 PROCEDURE — 36415 COLL VENOUS BLD VENIPUNCTURE: CPT

## 2024-07-29 PROCEDURE — 250N000009 HC RX 250

## 2024-07-29 PROCEDURE — 82040 ASSAY OF SERUM ALBUMIN: CPT

## 2024-07-29 PROCEDURE — 85027 COMPLETE CBC AUTOMATED: CPT

## 2024-07-29 PROCEDURE — 36522 PHOTOPHERESIS: CPT

## 2024-07-29 PROCEDURE — 250N000011 HC RX IP 250 OP 636

## 2024-07-29 PROCEDURE — 85007 BL SMEAR W/DIFF WBC COUNT: CPT

## 2024-07-29 RX ORDER — CALCIUM CARBONATE 500 MG/1
500 TABLET, CHEWABLE ORAL
Status: CANCELLED | OUTPATIENT
Start: 2024-07-29

## 2024-07-29 RX ADMIN — HEPARIN SODIUM 10 ML: 1000 INJECTION INTRAVENOUS; SUBCUTANEOUS at 12:30

## 2024-07-29 RX ADMIN — ANTICOAGULANT CITRATE DEXTROSE SOLUTION FORMULA A 159 ML: 12.25; 11; 3.65 SOLUTION INTRAVENOUS at 13:07

## 2024-07-30 ENCOUNTER — OFFICE VISIT (OUTPATIENT)
Dept: DERMATOLOGY | Facility: CLINIC | Age: 64
End: 2024-07-30
Payer: COMMERCIAL

## 2024-07-30 ENCOUNTER — HOSPITAL ENCOUNTER (OUTPATIENT)
Dept: LAB | Facility: CLINIC | Age: 64
Discharge: HOME | End: 2024-07-30
Attending: STUDENT IN AN ORGANIZED HEALTH CARE EDUCATION/TRAINING PROGRAM | Admitting: STUDENT IN AN ORGANIZED HEALTH CARE EDUCATION/TRAINING PROGRAM
Payer: COMMERCIAL

## 2024-07-30 VITALS
HEART RATE: 62 BPM | RESPIRATION RATE: 18 BRPM | SYSTOLIC BLOOD PRESSURE: 104 MMHG | DIASTOLIC BLOOD PRESSURE: 62 MMHG | TEMPERATURE: 98.1 F

## 2024-07-30 DIAGNOSIS — C84.04 MYCOSIS FUNGOIDES INVOLVING LYMPH NODES OF AXILLA (H): Primary | ICD-10-CM

## 2024-07-30 DIAGNOSIS — Z51.81 THERAPEUTIC DRUG MONITORING: ICD-10-CM

## 2024-07-30 PROCEDURE — 250N000009 HC RX 250: Performed by: PATHOLOGY

## 2024-07-30 PROCEDURE — 250N000011 HC RX IP 250 OP 636

## 2024-07-30 PROCEDURE — 99215 OFFICE O/P EST HI 40 MIN: CPT | Performed by: STUDENT IN AN ORGANIZED HEALTH CARE EDUCATION/TRAINING PROGRAM

## 2024-07-30 PROCEDURE — G2211 COMPLEX E/M VISIT ADD ON: HCPCS | Performed by: STUDENT IN AN ORGANIZED HEALTH CARE EDUCATION/TRAINING PROGRAM

## 2024-07-30 PROCEDURE — 250N000011 HC RX IP 250 OP 636: Performed by: PATHOLOGY

## 2024-07-30 PROCEDURE — 250N000009 HC RX 250

## 2024-07-30 PROCEDURE — 36522 PHOTOPHERESIS: CPT

## 2024-07-30 RX ADMIN — ANTICOAGULANT CITRATE DEXTROSE SOLUTION FORMULA A 165 ML: 12.25; 11; 3.65 SOLUTION INTRAVENOUS at 12:47

## 2024-07-30 RX ADMIN — ANTICOAGULANT CITRATE DEXTROSE SOLUTION FORMULA A: 12.25; 11; 3.65 SOLUTION INTRAVENOUS at 08:39

## 2024-07-30 RX ADMIN — HEPARIN SODIUM 10 ML: 1000 INJECTION INTRAVENOUS; SUBCUTANEOUS at 12:30

## 2024-07-30 RX ADMIN — HEPARIN SODIUM 10 ML: 1000 INJECTION INTRAVENOUS; SUBCUTANEOUS at 08:39

## 2024-07-30 ASSESSMENT — PAIN SCALES - GENERAL: PAINLEVEL: NO PAIN (0)

## 2024-07-30 NOTE — LETTER
7/30/2024       RE: Blane Ugalde  [address]  [place] [facility] [address]     Dear Colleague,    Thank you for referring your patient, Blane Ugalde, to the [facility] [place] at [facility]. Please see a copy of my visit note below.    I have personally examined this patient and agree with the medical student's documentation and plan of care. I have reviewed and amended the medical student's note as necessary.The documentation accurately reflects my clinical observations, diagnoses, treatment and follow-up plans.     Ezra Rico MD  Dermatology Staff      [facility] Dermatology Note    Encounter Date: Jul 30, 2024    Dermatology Problem List:  #MF stage IVA2; methotrexate 25mg/weekly, ECP, nbUVB (Treatment considerations include distance traveled, 300 miles from the [place])  -Erythrodermic, flow with 500 Sezary cells, CT scan with 9 mm right axillary lymph node    CT scan 02/01/24  IMPRESSION:   1. Prominent, rounded but not pathologically enlarged RIGHT axillary lymph nodes indeterminate for lymphomatous involvement.   2.  No additional pathologically enlarged lymph nodes within the chest, abdomen or pelvis.   3.  Hepatic steatosis.   8.  Enlarged main pulmonary artery can be seen with pulmonary arterial hypertension.     - 02/27/2024 discussed treatment options plan to proceed with ECP in agreement with oncologist, start methotrexate 25 mg weekly with folic acid, assess response in 3 months keeping in mind that ECP has maximum results sometimes up to 6 months.  Continue home light boost therapy, not titrating up dose recommended finding the instruction manual for his light unit and titrating up as directed. Start bleach baths  - 04/28/24 enlarged but still nominally normal LN in axilla, new inguinal LAD  - 05/01/24 Eval'd by onc, enlarged LN noted discussed careful monitoring w/ consideration of future imaging  and bx  - 05/28/24 after discussion w/ pt they would prefer US of lN for size/structure eval. IR referral for bx L LN. Consider transition from methotrexate to bexarotene or IFN. Updated Onc.   - 07/02/2 LN + for MF (no LCT). Now stage IVA2   - 07/17/24 discussed Ania vs IFN  - 07/23/24 2nd opinion w/ Gore who recommended mogamulizumab  - 07/30/24 Discussed risks and benefits for treatment options: methotrexate, interferon,  bexarotine, mogamolizumab, and photopheresis and possible adverse effects. Will speak w/ Karlos  ______________________________________    Impression/Plan:  Blane was seen today for follow up of CTCL     Follow-up in 3 months     #Mycosis fungoides Stage IVA2  (H) (chronic illness that poses risk to life, medication requiring intensive monitoring for toxicity)(Continuing focal point for medical care services related to serious/complex condition)  - currently on methotrexate, nbUVB and once monthly ECP.  - Patient was recently seen by Dr. Blankenship (Gore) for a second opinion on 7/23/24 who recommended adding mogamulizumab to his current regiment and option for methotrexate / local radiation therapy in case of treatment failure.   - Today, discussed risks and benefits of treatment options: methotrexate, interferon, bexarotine, mogamolizumab, and photopheresis at length with patient and his wife, who accompanied him today.   - Recommended patient discuss his preferred treatment options with oncologist Dr. Everett, has an appointment scheduled in 1 month.   - Patient continues to have pruritus, irritation, and exfoliation/ scaling of his skin with associated discomfort. Plan to:   - Continue methotrexate 2.5 MG tablet; Take 10 tablets (25 mg) by mouth every 7 days . Take the same day of the week  - Continue folic acid (FOLVITE) 1 MG tablet; Take 1 tablet (1 mg) by mouth daily , except on days that you take methotrexate  - Continue Clobetasol (TEMOVATE) 0.05 % external ointment BID to hyperkeratotic  hands  - Continue Tazarotene (TAZORAC) 0.1 % external cream BID to hyperkeratotic hands  - Continue Triamcinolone PRN on body for pruritus  - Continue Hydroxyzine 50mg nightly for pruritus  - Continue bleach baths 3x/week to help lower levels of any colonizing staph bacteria which may be present.  Exotoxins produced by staph bacteria have been shown experimentally to directly stimulate neoplastic T cells  - Scheduled for repeat CT chest abdomen on 8/26/24 believe was scheduled for follow-up of the lymphadenopathy that has now been biopsied.  Recommend that he discuss with his oncologist about the utility of this follow-up scan      Staff Involved:  Sommer Hall MS4  Ezra Rico MD   of Dermatology  Department of Dermatology  [employer] [facility] [employer]    CC:   No chief complaint on file.    History of Present Illness:  Mr. 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 Patient states he feels well and stable overall, had two episodes of fever (one after lymph node biopsy) but denies weight loss, night sweats, has had slight decrease in appetite. Patient reports that he feels nodules in his inguinal area which are getting larger. Recently saw Dr. Blankenship for a second opinion on 7/23/24.     Current medications:   - Methotrexate 25mg weekly  - folic acid 1mg daily   - Clobetasol ointment and tazarotine cream for hands   - Triamcinolone cream for body   - Hydroxyzine 50mg nightly     Labs:CBC and CMP from 7/29/24       Physical exam:  Vitals: There were no vitals taken for this visit.  GEN: well developed, well-nourished, in no acute distress, in a pleasant mood.     SKIN: Beasley phototype 2  - Sun-exposed skin, which includes the head/face, neck, both arms, digits, and/or nails was examined. Skin is remarkable for diffuse and widespread erythema of whole body, with  scaling and keratoderma most notable on the face and palms.   - No other lesions of concern on areas examined.     Past Medical History:   No past medical history on file.  Past Surgical History:   Procedure Laterality Date     APPENDECTOMY       IR LYMPH NODE BIOPSY  7/2/2024       Social History:   reports that he has never smoked. He has never used smokeless tobacco.    Family History:  No family history on file.    Medications:  Current Outpatient Medications   Medication Sig Dispense Refill     acetaminophen (TYLENOL) 325 MG tablet Take 650 mg by mouth       cetirizine (ZYRTEC) 10 MG tablet Take 10 mg by mouth       clobetasol (TEMOVATE) 0.05 % external ointment Apply topically 2 times daily 60 g 3     clobetasol propionate (CLOBEX) 0.05 % external shampoo        folic acid (FOLVITE) 1 MG tablet Take 1 tablet (1 mg) by mouth daily , except on days that you take methotrexate 26 tablet 11     hydrOXYzine HCl (ATARAX) 25 MG tablet Take 1-2 tablets (25-50 mg) by mouth 3 times daily as needed for itching 60 tablet 2     methotrexate 2.5 MG tablet Take 10 tablets (25 mg) by mouth every 7 days . Take the same day of the week. 40 tablet 5     omeprazole (PRILOSEC) 10 MG DR capsule Take 20 mg by mouth daily       pseudoePHEDrine (SUDAFED) 30 MG tablet Take 30 mg by mouth       tazarotene (TAZORAC) 0.1 % external cream Apply to areas of thick scaling on hands and feet daily 60 g 3     triamcinolone (KENALOG) 0.1 % external cream APPLY TO AFFECTED AREA 2 TIMES A DAY FOR 10 DAYS TO ARMS, CHEST, NECK, AND BACK       No Known Allergies              Again, thank you for allowing me to participate in the care of your patient.      Sincerely,    Ezra Rico MD

## 2024-07-30 NOTE — NURSING NOTE
Dermatology Rooming Note    Blane Ugalde's goals for this visit include:   Chief Complaint   Patient presents with    Derm Problem     Blane is here for a mycosis fungoides follow-up, states condition has remained the same since last seen.     Kumar Guerra, EMT

## 2024-07-30 NOTE — PROCEDURES
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  He is tolerating the procedure well.  He notes overall feeling well today.  Denies nausea, vomiting, fevers, chills, diarrhea. No significant changes to his skin at this point.       His next scheduled procedure is tomorrow.  Continue with plan per his clinical team. He has an upcoming follow up appointment with his team in August.              History of Present Illness:   Blane Ugalde is a 63 year old male with an h/o hyperlipidemia. a prothrombin gene mutation.  cutaneous T-cell lymphoma (CTCL)/Sézary syndrome. To summarize his course, he had a skin rash involving the arms that waxed an waned since about 2011. It progressed to involve his torso. Rash was raised, red/purple in color, and intermittently itched and basically did not improve with topical creams.  2 skin biopsies in 2021 were non-diagnostic, but one performed 01/2024 was felt to be most consistent with MF.   ECP treatment was started in March 2024.      The plan is to perform 2 procedures per month on two consecutive days              Past Medical History:   No past medical history on file.  CTCL          Past Surgical History:     Past Surgical History:   Procedure Laterality Date    APPENDECTOMY      IR LYMPH NODE BIOPSY  7/2/2024             Allergies:   No Known Allergies          Medications:     Current Outpatient Medications   Medication Sig Dispense Refill    acetaminophen (TYLENOL) 325 MG tablet Take 650 mg by mouth      cetirizine (ZYRTEC) 10 MG tablet Take 10 mg by mouth      clobetasol (TEMOVATE) 0.05 % external ointment Apply topically 2 times daily 60 g 3    clobetasol  propionate (CLOBEX) 0.05 % external shampoo       folic acid (FOLVITE) 1 MG tablet Take 1 tablet (1 mg) by mouth daily , except on days that you take methotrexate 26 tablet 11    hydrOXYzine HCl (ATARAX) 25 MG tablet Take 1-2 tablets (25-50 mg) by mouth 3 times daily as needed for itching 60 tablet 2    omeprazole (PRILOSEC) 10 MG DR capsule Take 20 mg by mouth daily      pseudoePHEDrine (SUDAFED) 30 MG tablet Take 30 mg by mouth      tazarotene (TAZORAC) 0.1 % external cream Apply to areas of thick scaling on hands and feet daily 60 g 3    triamcinolone (KENALOG) 0.1 % external cream APPLY TO AFFECTED AREA 2 TIMES A DAY FOR 10 DAYS TO ARMS, CHEST, NECK, AND BACK      methotrexate 2.5 MG tablet Take 10 tablets (25 mg) by mouth every 7 days . Take the same day of the week. 40 tablet 5     Current Facility-Administered Medications   Medication Dose Route Frequency Provider Last Rate Last Admin    methoxsalen (photopheresis) SOLN   Apheresis DOES NOT GO TO Jessica Valerio MD                 Review of Systems:     See above           Exam:   /65   Pulse 60   Temp 97.5  F (36.4  C) (Oral)   Resp 18   Wt 76.7 kg (169 lb 1.5 oz)   BMI 28.14 kg/m    Alert, no apparent distress  Breathing appears comfortable on room air  Peripheral IV access for the procedure.             Data:     Results for orders placed or performed during the hospital encounter of 07/29/24   Comprehensive metabolic panel     Status: Normal   Result Value Ref Range    Sodium 139 135 - 145 mmol/L    Potassium 4.2 3.4 - 5.3 mmol/L    Carbon Dioxide (CO2) 26 22 - 29 mmol/L    Anion Gap 10 7 - 15 mmol/L    Urea Nitrogen 9.6 8.0 - 23.0 mg/dL    Creatinine 0.80 0.67 - 1.17 mg/dL    GFR Estimate >90 >60 mL/min/1.73m2    Calcium 9.5 8.8 - 10.4 mg/dL    Chloride 103 98 - 107 mmol/L    Glucose 89 70 - 99 mg/dL    Alkaline Phosphatase 70 40 - 150 U/L    AST 27 0 - 45 U/L    ALT 19 0 - 70 U/L    Protein Total 6.9 6.4 - 8.3 g/dL    Albumin 4.2 3.5 -  5.2 g/dL    Bilirubin Total 0.8 <=1.2 mg/dL   CBC with platelets and differential     Status: Abnormal   Result Value Ref Range    WBC Count 5.8 4.0 - 11.0 10e3/uL    RBC Count 4.34 (L) 4.40 - 5.90 10e6/uL    Hemoglobin 13.6 13.3 - 17.7 g/dL    Hematocrit 40.9 40.0 - 53.0 %    MCV 94 78 - 100 fL    MCH 31.3 26.5 - 33.0 pg    MCHC 33.3 31.5 - 36.5 g/dL    RDW 14.2 10.0 - 15.0 %    Platelet Count 284 150 - 450 10e3/uL    % Neutrophils      % Lymphocytes      % Monocytes      % Eosinophils      % Basophils      % Immature Granulocytes      NRBCs per 100 WBC 0 <1 /100    Absolute Neutrophils      Absolute Lymphocytes      Absolute Monocytes      Absolute Eosinophils      Absolute Basophils      Absolute Immature Granulocytes      Absolute NRBCs 0.0 10e3/uL   Manual Differential     Status: None   Result Value Ref Range    % Neutrophils 64 %    % Lymphocytes 26 %    % Monocytes 9 %    % Eosinophils 0 %    % Basophils 1 %    Absolute Neutrophils 3.7 1.6 - 8.3 10e3/uL    Absolute Lymphocytes 1.5 0.8 - 5.3 10e3/uL    Absolute Monocytes 0.5 0.0 - 1.3 10e3/uL    Absolute Eosinophils 0.0 0.0 - 0.7 10e3/uL    Absolute Basophils 0.1 0.0 - 0.2 10e3/uL    RBC Morphology Confirmed RBC Indices     Platelet Assessment  Automated Count Confirmed. Platelet morphology is normal.     Automated Count Confirmed. Platelet morphology is normal.   CBC with platelets differential     Status: Abnormal    Narrative    The following orders were created for panel order CBC with platelets differential.  Procedure                               Abnormality         Status                     ---------                               -----------         ------                     CBC with platelets and d...[[id number]]  Abnormal            Final result               Manual Differential[[id number]]                              Final result                 Please view results for these tests on the individual orders.                Procedure Summary:    Extracorporeal photopheresis was performed on a [employer] device. Peripheral IV was used for access. Heparinized saline was used to prime the circuit and ACD-A was used during the procedure for anticoagulation.  The patient's vital signs were stable throughout.  The patient tolerated the procedure well without complication.  See apheresis flowsheet for additional details.       Attestation: During the procedure the patient was directly seen and evaluated by me, Tremayne Gillis MD.  I have reviewed the chart and pertinent laboratory findings, and discussed the patient and the current procedure with the Apheresis nursing staff.    Tremayne Gillis MD  Transfusion Medicine Attending  Laboratory Medicine & Pathology

## 2024-07-30 NOTE — PROGRESS NOTES
I have personally examined this patient and agree with the medical student's documentation and plan of care. I have reviewed and amended the medical student's note as necessary.The documentation accurately reflects my clinical observations, diagnoses, treatment and follow-up plans.     Ezra Rico MD  Dermatology Staff      [facility] Dermatology Note    Encounter Date: Jul 30, 2024    Dermatology Problem List:  #MF stage IVA2; methotrexate 25mg/weekly, ECP, nbUVB (Treatment considerations include distance traveled, 300 miles from the [place])  -Erythrodermic, flow with 500 Sezary cells, CT scan with 9 mm right axillary lymph node    CT scan 02/01/24  IMPRESSION:   1. Prominent, rounded but not pathologically enlarged RIGHT axillary lymph nodes indeterminate for lymphomatous involvement.   2.  No additional pathologically enlarged lymph nodes within the chest, abdomen or pelvis.   3.  Hepatic steatosis.   8.  Enlarged main pulmonary artery can be seen with pulmonary arterial hypertension.     - 02/27/2024 discussed treatment options plan to proceed with ECP in agreement with oncologist, start methotrexate 25 mg weekly with folic acid, assess response in 3 months keeping in mind that ECP has maximum results sometimes up to 6 months.  Continue home light boost therapy, not titrating up dose recommended finding the instruction manual for his light unit and titrating up as directed. Start bleach baths  - 04/28/24 enlarged but still nominally normal LN in axilla, new inguinal LAD  - 05/01/24 Eval'd by onc, enlarged LN noted discussed careful monitoring w/ consideration of future imaging and bx  - 05/28/24 after discussion w/ pt they would prefer US of lN for size/structure eval. IR referral for bx L LN. Consider transition from methotrexate to bexarotene or IFN. Updated Onc.   - 07/02/2 LN + for MF (no LCT). Now stage IVA2   - 07/17/24 discussed Ania vs IFN  - 07/23/24 2nd opinion w/ [place] who  recommended mogamulizumab  - 07/30/24 Discussed risks and benefits for treatment options: methotrexate, interferon,  bexarotine, mogamolizumab, and photopheresis and possible adverse effects. Will speak w/ Karlos  ______________________________________    Impression/Plan:  Blane was seen today for follow up of CTCL     Follow-up in 3 months     #Mycosis fungoides Stage IVA2  (H) (chronic illness that poses risk to life, medication requiring intensive monitoring for toxicity)(Continuing focal point for medical care services related to serious/complex condition)  - currently on methotrexate, nbUVB and once monthly ECP.  - Patient was recently seen by Dr. Blankenship ([place]) for a second opinion on 7/23/24 who recommended adding mogamulizumab to his current regiment and option for methotrexate / local radiation therapy in case of treatment failure.   - Today, discussed risks and benefits of treatment options: methotrexate, interferon, bexarotine, mogamolizumab, and photopheresis at length with patient and his wife, who accompanied him today.   - Recommended patient discuss his preferred treatment options with oncologist Dr. Everett, has an appointment scheduled in 1 month.   - Patient continues to have pruritus, irritation, and exfoliation/ scaling of his skin with associated discomfort. Plan to:   - Continue methotrexate 2.5 MG tablet; Take 10 tablets (25 mg) by mouth every 7 days . Take the same day of the week  - Continue folic acid (FOLVITE) 1 MG tablet; Take 1 tablet (1 mg) by mouth daily , except on days that you take methotrexate  - Continue Clobetasol (TEMOVATE) 0.05 % external ointment BID to hyperkeratotic hands  - Continue Tazarotene (TAZORAC) 0.1 % external cream BID to hyperkeratotic hands  - Continue Triamcinolone PRN on body for pruritus  - Continue Hydroxyzine 50mg nightly for pruritus  - Continue bleach baths 3x/week to help lower levels of any colonizing staph bacteria which may be present.  Exotoxins  produced by staph bacteria have been shown experimentally to directly stimulate neoplastic T cells  - Scheduled for repeat CT chest abdomen on 8/26/24 believe was scheduled for follow-up of the lymphadenopathy that has now been biopsied.  Recommend that he discuss with his oncologist about the utility of this follow-up scan      Staff Involved:  Sommer Hall MS4  Ezra Rico MD   of Dermatology  Department of Dermatology  [employer]    CC:   No chief complaint on file.    History of Present Illness:  Mr. 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 Patient states he feels well and stable overall, had two episodes of fever (one after lymph node biopsy) but denies weight loss, night sweats, has had slight decrease in appetite. Patient reports that he feels nodules in his inguinal area which are getting larger. Recently saw Dr. Blankenship for a second opinion on 7/23/24.     Current medications:   - Methotrexate 25mg weekly  - folic acid 1mg daily   - Clobetasol ointment and tazarotine cream for hands   - Triamcinolone cream for body   - Hydroxyzine 50mg nightly     Labs:CBC and CMP from 7/29/24       Physical exam:  Vitals: There were no vitals taken for this visit.  GEN: well developed, well-nourished, in no acute distress, in a pleasant mood.     SKIN: [place] phototype 2  - Sun-exposed skin, which includes the head/face, neck, both arms, digits, and/or nails was examined. Skin is remarkable for diffuse and widespread erythema of whole body, with scaling and keratoderma most notable on the face and palms.   - No other lesions of concern on areas examined.     Past Medical History:   No past medical history on file.  Past Surgical History:   Procedure Laterality Date    APPENDECTOMY      IR LYMPH NODE BIOPSY  7/2/2024       Social History:   reports  that he has never smoked. He has never used smokeless tobacco.    Family History:  No family history on file.    Medications:  Current Outpatient Medications   Medication Sig Dispense Refill    acetaminophen (TYLENOL) 325 MG tablet Take 650 mg by mouth      cetirizine (ZYRTEC) 10 MG tablet Take 10 mg by mouth      clobetasol (TEMOVATE) 0.05 % external ointment Apply topically 2 times daily 60 g 3    clobetasol propionate (CLOBEX) 0.05 % external shampoo       folic acid (FOLVITE) 1 MG tablet Take 1 tablet (1 mg) by mouth daily , except on days that you take methotrexate 26 tablet 11    hydrOXYzine HCl (ATARAX) 25 MG tablet Take 1-2 tablets (25-50 mg) by mouth 3 times daily as needed for itching 60 tablet 2    methotrexate 2.5 MG tablet Take 10 tablets (25 mg) by mouth every 7 days . Take the same day of the week. 40 tablet 5    omeprazole (PRILOSEC) 10 MG DR capsule Take 20 mg by mouth daily      pseudoePHEDrine (SUDAFED) 30 MG tablet Take 30 mg by mouth      tazarotene (TAZORAC) 0.1 % external cream Apply to areas of thick scaling on hands and feet daily 60 g 3    triamcinolone (KENALOG) 0.1 % external cream APPLY TO AFFECTED AREA 2 TIMES A DAY FOR 10 DAYS TO ARMS, CHEST, NECK, AND BACK       No Known Allergies

## 2024-07-30 NOTE — PROCEDURES
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   There were some technical issues with the apheresis device today, while troubleshooting the situation his access clotted off and we weren't able to continue the procedure.  Due to another clinic appointment for the patient, we haulted the procedure at this time. We were able to complete the procedure in the afternoon. No changes since yesterday.       Continue with plan per his clinical team. He has an upcoming follow up appointment with his team in August.              History of Present Illness:   Blane Ugalde is a 63 year old male with an h/o hyperlipidemia. a prothrombin gene mutation.  cutaneous T-cell lymphoma (CTCL)/Sézary syndrome. To summarize his course, he had a skin rash involving the arms that waxed an waned since about 2011. It progressed to involve his torso. Rash was raised, red/purple in color, and intermittently itched and basically did not improve with topical creams.  2 skin biopsies in 2021 were non-diagnostic, but one performed 01/2024 was felt to be most consistent with MF.   ECP treatment was started in March 2024.      The plan is to perform 2 procedures per month on two consecutive days              Past Medical History:   No past medical history on file.  CTCL          Past Surgical History:     Past Surgical History:   Procedure Laterality Date    APPENDECTOMY      IR LYMPH NODE BIOPSY  7/2/2024             Allergies:   No Known Allergies          Medications:     Current Outpatient Medications   Medication Sig Dispense Refill    acetaminophen (TYLENOL) 325 MG tablet Take 650 mg by mouth      cetirizine (ZYRTEC) 10 MG tablet  Take 10 mg by mouth      clobetasol (TEMOVATE) 0.05 % external ointment Apply topically 2 times daily 60 g 3    clobetasol propionate (CLOBEX) 0.05 % external shampoo       folic acid (FOLVITE) 1 MG tablet Take 1 tablet (1 mg) by mouth daily , except on days that you take methotrexate 26 tablet 11    hydrOXYzine HCl (ATARAX) 25 MG tablet Take 1-2 tablets (25-50 mg) by mouth 3 times daily as needed for itching 60 tablet 2    methotrexate 2.5 MG tablet Take 10 tablets (25 mg) by mouth every 7 days . Take the same day of the week. 40 tablet 5    omeprazole (PRILOSEC) 10 MG DR capsule Take 20 mg by mouth daily      pseudoePHEDrine (SUDAFED) 30 MG tablet Take 30 mg by mouth      tazarotene (TAZORAC) 0.1 % external cream Apply to areas of thick scaling on hands and feet daily 60 g 3    triamcinolone (KENALOG) 0.1 % external cream APPLY TO AFFECTED AREA 2 TIMES A DAY FOR 10 DAYS TO ARMS, CHEST, NECK, AND BACK       Current Facility-Administered Medications   Medication Dose Route Frequency Provider Last Rate Last Admin    methoxsalen (photopheresis) SOLN   Apheresis DOES NOT GO TO Jessica Valerio MD                 Review of Systems:     See above           Exam:   /62   Pulse 62   Temp 98.1  F (36.7  C) (Oral)   Resp 18   Alert, no apparent distress  Breathing appears comfortable on room air  Peripheral IV access for the procedure.             Data:       BMP  Recent Labs   Lab 07/29/24  1311      POTASSIUM 4.2   CHLORIDE 103   TASHA 9.5   CO2 26   BUN 9.6   CR 0.80   GLC 89     CBC  Recent Labs   Lab 07/29/24  1311   WBC 5.8   RBC 4.34*   HGB 13.6   HCT 40.9   MCV 94   MCH 31.3   MCHC 33.3   RDW 14.2                   Procedure Summary:   Extracorporeal photopheresis was performed on a [employer] device. Peripheral IV was used for access. Heparinized saline was used to prime the circuit and ACD-A was used during the procedure for anticoagulation.  The patient's vital signs were stable throughout.   The patient tolerated the procedure well without complication.  See apheresis flowsheet for additional details.           Attestation: During the procedure the patient was directly seen and evaluated by me, Tremayne Gillis MD.  I have reviewed the chart and pertinent laboratory findings, and discussed the patient and the current procedure with the Apheresis nursing staff.    Tremayne Gillis MD  Transfusion Medicine Attending  Laboratory Medicine & Pathology

## 2024-08-05 ENCOUNTER — PATIENT OUTREACH (OUTPATIENT)
Dept: ONCOLOGY | Facility: CLINIC | Age: 64
End: 2024-08-05
Payer: COMMERCIAL

## 2024-08-05 PROBLEM — C84.18: Status: ACTIVE | Noted: 2024-08-05

## 2024-08-05 NOTE — PROGRESS NOTES
Virtual Visit Details    Type of service:  Video Visit     Originating Location (pt. Location): Home    Distant Location (provider location):  On-site  Platform used for Video Visit: Pauline        REASON FOR VISIT:  Management of cutaneous T cell lymphoma most consistent with mycosis fungoides (MF) vs Sézary Syndrome (SS)    HISTORY OF PRESENT ILLNESS:  Blane Ugalde is a 63 year old with 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  To summarize his course, he had a skin rash involving the arms that waxed an waned since about 2011.  It progressed to involve his torso.  Rash was raised, red/purple in color, and intermittently itched.  There was limited benefit from topical creams.  Skin biopsy in early 2021 suggested interface dermatitis.  Skin biopsy in 07/2021 had atypical lymphoid infiltrates but with no specific histologic diagnosis.  Skin biopsy in 01/2024 was felt to be most consistent with MF.  Bone marrow biopsy in 02/2024 had no obvious bone marrow involvement but peripheral blood flow cytometry showed about 0.5 x 10^9/L clonal T cells (Sezary cells).  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  He had received NBUVB treatment in the past with some improvement in itching and some relief from topical steroids but not durable.  Connected with our Derm Lymphoma Team in 02/2024 and started ECP and weekly low-dose methotrexate.  He had a CT scan on 4/26/2024 that showed prominent axillary lymphadenopathy on the right greater than left and slightly enlarged bilateral inguinal lymphadenopathy, and core needle biopsy of a left axillary lymph node in 07/2024 was consistent with involvement by MF with minimal CD30 expression and no evidence of large cell transformation.  He had a visit at [facility] for another opinion in 07/2024, workup including peripheral blood flow cytometry included about 1.1 x  10^9/L clonal T cells - overall consistent with clinical stage IVA2 SS, and they recommended treatment with mogamulizumab.  Visit for ongoing management of MF vs SS.    He is not with wife Irma or son Sterling.  Still dealing with fatigue, skin peeling, and scattered lumps and bumps remain noticeable.  Waxing and wining - about a week on and week off.  Nothing that seems infected.  Itching has been a bit better.  No major changes.  Not much better and not much worse.  No fevers, frequent night sweats, or unintentional weight loss.  Normal bowel function.  No urinary complaints.  No bleeding or unusual bruising.  He continues work full time.  Headed to [place] in about a [place] with the family.  Interested in discussing management options.    PAST MEDICAL HISTORY:  Prothrombin gene mutation, hyperlipidemia    MEDICATIONS:  Current Outpatient Medications   Medication Sig Dispense Refill    acetaminophen (TYLENOL) 325 MG tablet Take 650 mg by mouth      cetirizine (ZYRTEC) 10 MG tablet Take 10 mg by mouth      clobetasol (TEMOVATE) 0.05 % external ointment Apply topically 2 times daily 60 g 3    clobetasol propionate (CLOBEX) 0.05 % external shampoo       folic acid (FOLVITE) 1 MG tablet Take 1 tablet (1 mg) by mouth daily , except on days that you take methotrexate 26 tablet 11    hydrOXYzine HCl (ATARAX) 25 MG tablet Take 1-2 tablets (25-50 mg) by mouth 3 times daily as needed for itching 60 tablet 2    methotrexate 2.5 MG tablet Take 10 tablets (25 mg) by mouth every 7 days . Take the same day of the week. 40 tablet 5    omeprazole (PRILOSEC) 10 MG DR capsule Take 20 mg by mouth daily      pseudoePHEDrine (SUDAFED) 30 MG tablet Take 30 mg by mouth      tazarotene (TAZORAC) 0.1 % external cream Apply to areas of thick scaling on hands and feet daily 60 g 3    triamcinolone (KENALOG) 0.1 % external cream APPLY TO AFFECTED AREA 2 TIMES A DAY FOR 10 DAYS TO ARMS, CHEST, NECK, AND BACK       No current facility-administered  "medications for this visit.     SOCIAL HISTORY:  never smoker, , 3 kids, contractor, lives in [place], [place]    PHYSICAL EXAMINATION:  Ht 1.676 m (5' 6\")   Wt 77.1 kg (170 lb)   BMI 27.44 kg/m    Wt Readings from Last 5 Encounters:   08/07/24 77.1 kg (170 lb)   07/29/24 76.7 kg (169 lb 1.5 oz)   05/28/24 79.7 kg (175 lb 11.3 oz)   05/01/24 78.8 kg (173 lb 11.2 oz)   04/04/24 78.8 kg (173 lb 11.6 oz)     General: Well appearing.  HEENT: Sclerae anicteric.  Lungs: Breathing comfortably. No cough.  MSK: Grossly normal movement.  Neuro: Grossly non-focal.  Psych: Alert and oriented. No distress.  Performance status: ECOG 1    LABORATORY DATA: Reviewed by me  Labs reviewed in Care Everywhere  7/18-23/2024 WBC 6.88, Hb 13.4, platelet 285, creatinine 1.03, liver labs normal, , B2M 3.45  Flow cytometry as noted in HPI with 1.1 x 10^9/L clonal T cells    Recent Labs   Lab Test 07/29/24  1311 07/01/24  1316 05/28/24  1249 05/01/24  1031 04/04/24  1246 03/06/24  1317   WBC 5.8 4.6 5.1 5.1 5.3 6.9   HGB 13.6 14.2 13.7 14.7 14.2 14.8    232 242 259 234 278   ANEU 3.7 3.4 3.6  --   --   --    ANEUTAUTO  --   --   --  3.4 3.8 5.1   ALYM 1.5 0.8 0.8  --   --   --    ALYMPAUTO  --   --   --  0.9 0.9 1.0     Recent Labs   Lab Test 07/29/24  1311 07/02/24  0823 05/28/24  1249 05/01/24  1031    139 138 139   POTASSIUM 4.2 4.3 4.2 4.3   CHLORIDE 103 103 102 104   CO2 26 26 26 26   BUN 9.6 9.9 13.1 12.3   CR 0.80 0.98 0.87 0.95   TASHA 9.5 9.3 9.1 9.4   URIC  --   --   --  6.4   LDH  --   --   --  246     Recent Labs   Lab Test 07/29/24  1311 07/02/24  1234 07/02/24  0823 05/28/24  1249   AST 27  --  24 30   ALT 19  --  18 18   ALKPHOS 70  --  68 64   BILITOTAL 0.8  --  1.0 0.9   INR  --  0.90  --   --      IMPRESSION AND PLAN:  Blane Ugalde is a 63 year old with 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    Most recent workup including flow cytometry at " [facility] and lymph node biopsy in 07/2024 are most consistent with clinical stage IVA2 SS.  There has not been meaningful improvement with about 6 months of once monthly ECP and low-dose weekly methotrexate.  I would agree with changing therapy to try to improve lymphoma and symptom control.  We discussed options for moving forward.    We discussed the goal of balancing the benefits of treatment with side effects, potential toxicity, and need for visits and supportive care - and that the goal is to maximize the treatment benefit with as little impact on quality of life as possible.  Treatments short of alloBMT are not curative and sequential therapy, maximizing the benefit from each line of therapy, is the usual approach with years of disease control being realistic - but responses and outcomes vary widely.  At this point, the most appealing options include oral bexarotene, IFN injections, or immunotherapy with mogamulizumab combined with ECP.  We had been considering bexarotene + ECP since it may be the least complicated and tolerable option.  The team at [facility] recommended mogamulizumab and increased ECP to 2 days every 2 weeks.  While each of the options are reasonable and response rates are comparable, with the continued widespread skin involvement, lack of response to ECP and methotrexate, and B2 blood involvement - mogamulizumab + ECP is a reasonable option.  It would be reasonable to intensify ECP, but he feels that traveling for ECP every 2 weeks would be too disruptive to his life - so we will hold off on that.  Radiation to any sites of bothersome or threatening lymphoma can always be considered.  We agreed to work on setting up mogamulizumab treatments - ideally closer to home with his local oncologist Dr. Johnson if possible.  We will obtain a CT in the next few weeks for a new baseline as we move to another line of therapy.    I reviewed the typical schedule including weekly mogamulizumab for the first 4 weeks  and then every other week as long as is it having benefit and remains tolerable.  We discussed the most common side effects including infusion reactions and drug rash and can also include fatigue, bone marrow suppression, allergic reaction, GI issues, infection, bleeding, damage to heart/lung/liver/kidneys, neuropathy.  While complications can be life threatening, the greatest risk is the lymphoma.  We also discussed the possibility of poor response or relapse of disease despite the planned therapy.  They asked good questions and agree with the plan.    There are no active ID issues.  I have recommended Prevnar 20, the Shingrix vaccine series, a flu shot, RSV vaccine, and an updated COVID-19 vaccine if he has not received these.  He declined vaccines.    He should have Hep B screening prior to mogamulizumab treatment.    He will continue to work with his primary care clinic for health maintenance and other medical issues.  We will keep them in the loop.    I discussed the plan with Dr. Rush Johnson.  We will plan for the first infusion at our clinic and subsequent infusions in their clinic.  He will continue to work with Dr. Ezra Rico from the Derm Lymphoma Team for skin directed therapy and ECP.  We will request a visit with me in about 3 months.  I reminded him to contact us if questions, concerns, or new issues come up between visits.    Video start time: 8:33 AM  Video end time: 8:58 AM  Video duration: 24 minutes    Total of 60 minutes on patient visit, reviewing records, interpreting test results, placing orders, and documentation on the day of service.    The longitudinal plan of care for the diagnosis(es)/condition(s) as documented were addressed during this visit. Due to the added complexity in care, I will continue to support Blane in the subsequent management and with ongoing continuity of care.    Roberto Everett MD, PhD  Attending Physician, [facility]  [phone] [facility]

## 2024-08-05 NOTE — PROGRESS NOTES
[facility]: Cancer Care                                                                                          Call to pt to offer to move up appt as Dr Everett has an opening this week.  Pt recently seen at [facility] for 2nd opinion and that MD sent recommendations to Dr Everett.  He states he can meet earlier with pt to discuss treatment options and recommendations from [facility].  Pt agreeable to make the appt change, message sent to scheduling to reschedule.      JACQUELINE Valderrama, RN  RN Care Coordinator  [facility]

## 2024-08-07 ENCOUNTER — VIRTUAL VISIT (OUTPATIENT)
Dept: ONCOLOGY | Facility: CLINIC | Age: 64
End: 2024-08-07
Attending: INTERNAL MEDICINE
Payer: COMMERCIAL

## 2024-08-07 VITALS — HEIGHT: 66 IN | BODY MASS INDEX: 27.32 KG/M2 | WEIGHT: 170 LBS

## 2024-08-07 DIAGNOSIS — C84.18 SEZARY DISEASE INVOLVING LYMPH NODES OF MULTIPLE REGIONS (H): ICD-10-CM

## 2024-08-07 DIAGNOSIS — C84.09 MYCOSIS FUNGOIDES INVOLVING EXTRANODAL SITE EXCLUDING SPLEEN AND OTHER SOLID ORGANS (H): Primary | ICD-10-CM

## 2024-08-07 PROCEDURE — G2211 COMPLEX E/M VISIT ADD ON: HCPCS | Mod: 95 | Performed by: INTERNAL MEDICINE

## 2024-08-07 PROCEDURE — 99215 OFFICE O/P EST HI 40 MIN: CPT | Mod: 95 | Performed by: INTERNAL MEDICINE

## 2024-08-07 PROCEDURE — 99417 PROLNG OP E/M EACH 15 MIN: CPT | Mod: 95 | Performed by: INTERNAL MEDICINE

## 2024-08-07 RX ORDER — DIPHENHYDRAMINE HCL 25 MG
50 CAPSULE ORAL ONCE
Status: CANCELLED | OUTPATIENT
Start: 2024-08-27

## 2024-08-07 RX ORDER — MEPERIDINE HYDROCHLORIDE 25 MG/ML
25 INJECTION INTRAMUSCULAR; INTRAVENOUS; SUBCUTANEOUS EVERY 30 MIN PRN
Status: CANCELLED | OUTPATIENT
Start: 2024-08-27

## 2024-08-07 RX ORDER — HEPARIN SODIUM,PORCINE 10 UNIT/ML
5-20 VIAL (ML) INTRAVENOUS DAILY PRN
Status: CANCELLED | OUTPATIENT
Start: 2024-08-27

## 2024-08-07 RX ORDER — DIPHENHYDRAMINE HYDROCHLORIDE 50 MG/ML
50 INJECTION INTRAMUSCULAR; INTRAVENOUS
Status: CANCELLED
Start: 2024-08-27

## 2024-08-07 RX ORDER — HEPARIN SODIUM (PORCINE) LOCK FLUSH IV SOLN 100 UNIT/ML 100 UNIT/ML
5 SOLUTION INTRAVENOUS
Status: CANCELLED | OUTPATIENT
Start: 2024-08-27

## 2024-08-07 RX ORDER — METHYLPREDNISOLONE SODIUM SUCCINATE 125 MG/2ML
125 INJECTION, POWDER, LYOPHILIZED, FOR SOLUTION INTRAMUSCULAR; INTRAVENOUS
Status: CANCELLED
Start: 2024-08-27

## 2024-08-07 RX ORDER — EPINEPHRINE 1 MG/ML
0.3 INJECTION, SOLUTION INTRAMUSCULAR; SUBCUTANEOUS EVERY 5 MIN PRN
Status: CANCELLED | OUTPATIENT
Start: 2024-08-27

## 2024-08-07 RX ORDER — ACETAMINOPHEN 325 MG/1
650 TABLET ORAL ONCE
Status: CANCELLED | OUTPATIENT
Start: 2024-08-27

## 2024-08-07 RX ORDER — ALBUTEROL SULFATE 0.83 MG/ML
2.5 SOLUTION RESPIRATORY (INHALATION)
Status: CANCELLED | OUTPATIENT
Start: 2024-08-27

## 2024-08-07 RX ORDER — LORAZEPAM 2 MG/ML
0.5 INJECTION INTRAMUSCULAR EVERY 4 HOURS PRN
Status: CANCELLED | OUTPATIENT
Start: 2024-08-27

## 2024-08-07 RX ORDER — ONDANSETRON 2 MG/ML
8 INJECTION INTRAMUSCULAR; INTRAVENOUS ONCE
Status: CANCELLED | OUTPATIENT
Start: 2024-08-27

## 2024-08-07 RX ORDER — ALBUTEROL SULFATE 90 UG/1
1-2 AEROSOL, METERED RESPIRATORY (INHALATION)
Status: CANCELLED
Start: 2024-08-27

## 2024-08-07 ASSESSMENT — PAIN SCALES - GENERAL: PAINLEVEL: NO PAIN (0)

## 2024-08-07 NOTE — LETTER
8/7/2024      Blane Ugalde  [address] [facility] [address]      Dear Colleague,    Thank you for referring your patient, Blane Ugalde, to the [facility]. Please see a copy of my visit note below.    Virtual Visit Details    Type of service:  Video Visit     Originating Location (pt. 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  To summarize his course, he had a skin rash involving the arms that waxed an waned since about 2011.  It progressed to involve his torso.  Rash was raised, red/purple in color, and intermittently itched.  There was limited benefit from topical creams.  Skin biopsy in early 2021 suggested interface dermatitis.  Skin biopsy in 07/2021 had atypical lymphoid infiltrates but with no specific histologic diagnosis.  Skin biopsy in 01/2024 was felt to be most consistent with MF.  Bone marrow biopsy in 02/2024 had no obvious bone marrow involvement but peripheral blood flow cytometry showed about 0.5 x 10^9/L clonal T cells (Sezary cells).  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  He had received NBUVB treatment in the past with some improvement in itching and some relief from topical steroids but not durable.  Connected with our Derm Lymphoma Team in 02/2024 and started ECP and weekly low-dose methotrexate.  He had a CT scan on 4/26/2024 that showed prominent axillary lymphadenopathy on the right greater than left and slightly enlarged bilateral inguinal lymphadenopathy, and core needle biopsy of a left axillary lymph node in 07/2024 was  consistent with involvement by MF with minimal CD30 expression and no evidence of large cell transformation.  He had a visit at [facility] for another opinion in 07/2024, workup including peripheral blood flow cytometry included about 1.1 x 10^9/L clonal T cells - overall consistent with clinical stage IVA2 SS, and they recommended treatment with mogamulizumab.  Visit for ongoing management of MF vs SS.    He is not with wife Irma or son Sterling.  Still dealing with fatigue, skin peeling, and scattered lumps and bumps remain noticeable.  Waxing and wining - about a week on and week off.  Nothing that seems infected.  Itching has been a bit better.  No major changes.  Not much better and not much worse.  No fevers, frequent night sweats, or unintentional weight loss.  Normal bowel function.  No urinary complaints.  No bleeding or unusual bruising.  He continues work full time.  Headed to [place] in about a [place] with the family.  Interested in discussing management options.    PAST MEDICAL HISTORY:  Prothrombin gene mutation, hyperlipidemia    MEDICATIONS:  Current Outpatient Medications   Medication Sig Dispense Refill     acetaminophen (TYLENOL) 325 MG tablet Take 650 mg by mouth       cetirizine (ZYRTEC) 10 MG tablet Take 10 mg by mouth       clobetasol (TEMOVATE) 0.05 % external ointment Apply topically 2 times daily 60 g 3     clobetasol propionate (CLOBEX) 0.05 % external shampoo        folic acid (FOLVITE) 1 MG tablet Take 1 tablet (1 mg) by mouth daily , except on days that you take methotrexate 26 tablet 11     hydrOXYzine HCl (ATARAX) 25 MG tablet Take 1-2 tablets (25-50 mg) by mouth 3 times daily as needed for itching 60 tablet 2     methotrexate 2.5 MG tablet Take 10 tablets (25 mg) by mouth every 7 days . Take the same day of the week. 40 tablet 5     omeprazole (PRILOSEC) 10 MG DR capsule Take 20 mg by mouth daily       pseudoePHEDrine (SUDAFED) 30 MG tablet Take 30 mg by mouth       tazarotene (TAZORAC) 0.1 %  "external cream Apply to areas of thick scaling on hands and feet daily 60 g 3     triamcinolone (KENALOG) 0.1 % external cream APPLY TO AFFECTED AREA 2 TIMES A DAY FOR 10 DAYS TO ARMS, CHEST, NECK, AND BACK       No current facility-administered medications for this visit.     SOCIAL HISTORY:  never smoker, , 3 kids, contractor, lives in [place], [place]    PHYSICAL EXAMINATION:  Ht 1.676 m (5' 6\")   Wt 77.1 kg (170 lb)   BMI 27.44 kg/m    Wt Readings from Last 5 Encounters:   08/07/24 77.1 kg (170 lb)   07/29/24 76.7 kg (169 lb 1.5 oz)   05/28/24 79.7 kg (175 lb 11.3 oz)   05/01/24 78.8 kg (173 lb 11.2 oz)   04/04/24 78.8 kg (173 lb 11.6 oz)     General: Well appearing.  HEENT: Sclerae anicteric.  Lungs: Breathing comfortably. No cough.  MSK: Grossly normal movement.  Neuro: Grossly non-focal.  Psych: Alert and oriented. No distress.  Performance status: ECOG 1    LABORATORY DATA: Reviewed by me  Labs reviewed in Care Everywhere  7/18-23/2024 WBC 6.88, Hb 13.4, platelet 285, creatinine 1.03, liver labs normal, , B2M 3.45  Flow cytometry as noted in HPI with 1.1 x 10^9/L clonal T cells    Recent Labs   Lab Test 07/29/24  1311 07/01/24  1316 05/28/24  1249 05/01/24  1031 04/04/24  1246 03/06/24  1317   WBC 5.8 4.6 5.1 5.1 5.3 6.9   HGB 13.6 14.2 13.7 14.7 14.2 14.8    232 242 259 234 278   ANEU 3.7 3.4 3.6  --   --   --    ANEUTAUTO  --   --   --  3.4 3.8 5.1   ALYM 1.5 0.8 0.8  --   --   --    ALYMPAUTO  --   --   --  0.9 0.9 1.0     Recent Labs   Lab Test 07/29/24  1311 07/02/24  0823 05/28/24  1249 05/01/24  1031    139 138 139   POTASSIUM 4.2 4.3 4.2 4.3   CHLORIDE 103 103 102 104   CO2 26 26 26 26   BUN 9.6 9.9 13.1 12.3   CR 0.80 0.98 0.87 0.95   TASHA 9.5 9.3 9.1 9.4   URIC  --   --   --  6.4   LDH  --   --   --  246     Recent Labs   Lab Test 07/29/24  1311 07/02/24  1234 07/02/24  0823 05/28/24  1249   AST 27  --  24 30   ALT 19  --  18 18   ALKPHOS 70  --  68 64   BILITOTAL 0.8  " --  1.0 0.9   INR  --  0.90  --   --      IMPRESSION AND PLAN:  Blane Ugalde is a 63 year old with 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    Most recent workup including flow cytometry at [facility] and lymph node biopsy in 07/2024 are most consistent with clinical stage IVA2 SS.  There has not been meaningful improvement with about 6 months of once monthly ECP and low-dose weekly methotrexate.  I would agree with changing therapy to try to improve lymphoma and symptom control.  We discussed options for moving forward.    We discussed the goal of balancing the benefits of treatment with side effects, potential toxicity, and need for visits and supportive care - and that the goal is to maximize the treatment benefit with as little impact on quality of life as possible.  Treatments short of alloBMT are not curative and sequential therapy, maximizing the benefit from each line of therapy, is the usual approach with years of disease control being realistic - but responses and outcomes vary widely.  At this point, the most appealing options include oral bexarotene, IFN injections, or immunotherapy with mogamulizumab combined with ECP.  We had been considering bexarotene + ECP since it may be the least complicated and tolerable option.  The team at [facility] recommended mogamulizumab and increased ECP to 2 days every 2 weeks.  While each of the options are reasonable and response rates are comparable, with the continued widespread skin involvement, lack of response to ECP and methotrexate, and B2 blood involvement - mogamulizumab + ECP is a reasonable option.  It would be reasonable to intensify ECP, but he feels that traveling for ECP every 2 weeks would be too disruptive to his life - so we will hold off on that.  Radiation to any sites of bothersome or threatening lymphoma can always be considered.  We agreed to work on setting up mogamulizumab treatments - ideally closer to  home with his local oncologist Dr. Johnson if possible.  We will obtain a CT in the next few weeks for a new baseline as we move to another line of therapy.    I reviewed the typical schedule including weekly mogamulizumab for the first 4 weeks and then every other week as long as is it having benefit and remains tolerable.  We discussed the most common side effects including infusion reactions and drug rash and can also include fatigue, bone marrow suppression, allergic reaction, GI issues, infection, bleeding, damage to heart/lung/liver/kidneys, neuropathy.  While complications can be life threatening, the greatest risk is the lymphoma.  We also discussed the possibility of poor response or relapse of disease despite the planned therapy.  They asked good questions and agree with the plan.    There are no active ID issues.  I have recommended Prevnar 20, the Shingrix vaccine series, a flu shot, RSV vaccine, and an updated COVID-19 vaccine if he has not received these.  He declined vaccines.    He should have Hep B screening prior to mogamulizumab treatment.    He will continue to work with his primary care clinic for health maintenance and other medical issues.  We will keep them in the loop.    I discussed the plan with Dr. Rush Johnson.  We will plan for the first infusion at our clinic and subsequent infusions in their clinic.  He will continue to work with Dr. Ezra Rico from the Derm Lymphoma Team for skin directed therapy and ECP.  We will request a visit with me in about 3 months.  I reminded him to contact us if questions, concerns, or new issues come up between visits.    Video start time: 8:33 AM  Video end time: 8:58 AM  Video duration: 24 minutes    Total of 60 minutes on patient visit, reviewing records, interpreting test results, placing orders, and documentation on the day of service.    The longitudinal plan of care for the diagnosis(es)/condition(s) as documented were addressed during this visit.  Due to the added complexity in care, I will continue to support Blane in the subsequent management and with ongoing continuity of care.    Roberto Everett MD, PhD  Attending Physician, [facility]  [phone] clinic          Again, thank you for allowing me to participate in the care of your patient.        Sincerely,        Roberto Everett MD

## 2024-08-07 NOTE — NURSING NOTE
Current patient location: [address] [facility] [address]    Is the patient currently in the state of [place]? YES    Visit mode:VIDEO    If the visit is dropped, the patient can be reconnected by: VIDEO VISIT: Send to e-mail at: [email or web address]    Will anyone else be joining the visit? NO  (If patient encounters technical issues they should call [phone])    How would you like to obtain your AVS? MyChart    Are changes needed to the allergy or medication list? No    Are refills needed on medications prescribed by this physician? NO    Rooming Documentation:  Unable to complete questionnaire(s) due to time      Reason for visit: MIGUELINA HARVEY

## 2024-08-08 DIAGNOSIS — C84.18 SEZARY DISEASE INVOLVING LYMPH NODES OF MULTIPLE REGIONS (H): ICD-10-CM

## 2024-08-08 DIAGNOSIS — C84.09 MYCOSIS FUNGOIDES INVOLVING EXTRANODAL SITE EXCLUDING SPLEEN AND OTHER SOLID ORGANS (H): Primary | ICD-10-CM

## 2024-08-09 ENCOUNTER — PATIENT OUTREACH (OUTPATIENT)
Dept: ONCOLOGY | Facility: CLINIC | Age: 64
End: 2024-08-09
Payer: COMMERCIAL

## 2024-08-09 NOTE — PROGRESS NOTES
[facility]: Cancer Care Plan of Care Education Note                                    Discussion with Patient:                                                      Medication info sent to pt via my chart for mogamulizumab.  Will call pt next week to complete teaching.      Assessment:                                                      Assessment completed with:: Patient    Plan of Care Education   Yearly learning assessment completed?: Yes (see Education tab)  Diagnosis:: Mycosis fungoides  Does patient understand diagnosis?: Yes  Tx plan/regimen:: Mogamulizumab  Does patient understand treatment plan/regimen?: Yes  Preparing for treatment:: Reviewed treatment preparation information with patient (vascular access, day of chemo, visitor policy, what to bring, etc.)  Vascular access education provided for:: Peripheral IV  Side effect education:: Fatigue;Skin changes;Mylosuppression;Lab value monitoring (anemia, neutropenia, thrombocytopenia);Infection  Safety/self care at home reviewed with patient:: Yes  Coping - concerns/fears reviewed with patient:: Yes  Plan of Care:: MD follow-up appointment;Lab appointment;Treatment schedule  When to call provider:: Bleeding;Increased shortness of breath;New/worsening pain;Shaking chills;Uncontrolled diarrhea/constipation;Temperature >100.4F;Uncontrolled nausea/vomiting  Additional education provided for: : Bleeding precautions;Neutropenic precautions    Evaluation of Learning  Patient Education Provided: Yes  Readiness:: Acceptance  Method:: Literature  Response:: Needs reinforcement    No assessment indicated    Intervention/Education provided during outreach:                                                       Patient to follow up as scheduled at next appt  Patient to call/[employer] message with updates  Confirmed patient has clinic and triage numbers    JACQUELINE Valderrama, RN  RN Care Coordinator  [facility]

## 2024-08-12 DIAGNOSIS — C84.09 MYCOSIS FUNGOIDES INVOLVING EXTRANODAL SITE EXCLUDING SPLEEN AND OTHER SOLID ORGANS (H): Primary | ICD-10-CM

## 2024-08-12 DIAGNOSIS — C84.18 SEZARY DISEASE INVOLVING LYMPH NODES OF MULTIPLE REGIONS (H): ICD-10-CM

## 2024-08-13 ENCOUNTER — PATIENT OUTREACH (OUTPATIENT)
Dept: ONCOLOGY | Facility: CLINIC | Age: 64
End: 2024-08-13
Payer: COMMERCIAL

## 2024-08-13 NOTE — PROGRESS NOTES
[facility]: Cancer Care                                                                                          In review of pts upcoming appts, noted that Pet scan was scheduled too close to infusion appt and that pt would not be able to get to appt.  Call to pt and rescheduled PET for 8/28 7:30.  PET information and prep instructions sent to pt via my chart along with updated schedule.    Call placed to Dr Johnson's office, spoke with his RNCC and pharmacy.  Updated that we will start C1D1 here on 8/27, they will need to arrange remaining doses of C1.  They have the orders and do not need further info from us at this time but have writer's contact information if further questions arise.    Taylor Rubi, FAISALN, RN  RN Care Coordinator  [facility]

## 2024-08-14 ENCOUNTER — MYC REFILL (OUTPATIENT)
Dept: ONCOLOGY | Facility: CLINIC | Age: 64
End: 2024-08-14
Payer: COMMERCIAL

## 2024-08-14 DIAGNOSIS — C84.09 MYCOSIS FUNGOIDES INVOLVING EXTRANODAL SITE EXCLUDING SPLEEN AND OTHER SOLID ORGANS (H): ICD-10-CM

## 2024-08-14 RX ORDER — HYDROXYZINE HYDROCHLORIDE 25 MG/1
25-50 TABLET, FILM COATED ORAL 3 TIMES DAILY PRN
Qty: 60 TABLET | Refills: 2 | Status: SHIPPED | OUTPATIENT
Start: 2024-08-14 | End: 2024-08-14

## 2024-08-14 NOTE — TELEPHONE ENCOUNTER
Pending Prescriptions:                       Disp   Refills    hydrOXYzine HCl (ATARAX) 25 MG tablet     60 tab*2            Sig: Take 1-2 tablets (25-50 mg) by mouth 3 times           daily as needed for itching    Last prescribing provider:     Last clinic visit date: 08/07/24 w/    Recommendations for requested medication (if none, N/A): N/A    Any other pertinent information (if none, N/A): Pt stating he would like to  prescription today since he will be going on vacation.    Refilled: Y/N, if NO, why?

## 2024-08-15 RX ORDER — HYDROXYZINE HYDROCHLORIDE 25 MG/1
25-50 TABLET, FILM COATED ORAL 3 TIMES DAILY PRN
Qty: 60 TABLET | Refills: 2 | Status: SHIPPED | OUTPATIENT
Start: 2024-08-15

## 2024-08-15 NOTE — TELEPHONE ENCOUNTER
Medication:hydrOXYzine   Last prescribing provider:DR. Everett  Last clinic visit date: 8/7/2024 Dr. Everett  Recommendations for requested medication:n/a  Any other pertinent information:routed to Dr. Everett

## 2024-08-16 ENCOUNTER — PATIENT OUTREACH (OUTPATIENT)
Dept: ONCOLOGY | Facility: CLINIC | Age: 64
End: 2024-08-16
Payer: COMMERCIAL

## 2024-08-16 NOTE — PROGRESS NOTES
[facility]: Cancer Care                                                                                          Message received yesterday from infusion finance that pt has approved PA for mogamulizumab at another location and we are unable to request 2nd PA.  Writer called and spoke with Shama [facility] for Dr Johnson and confirmed that they had PA approved at the beginning of this month.  Shama spoke with Dr Johnson, pharmacy and infusion, all in agreement that they are ok to give first dose starting on 8/29.  Discussed with Dr Everett and ok to start treatment up there.  Shama states they will complete the teaching for mogamulizumab.      Call to pt to update, educated change in plan due to insurance approval.  Pt agreeable to have all doses at [facility].  Pt requesting to have PET scan moved back to 8/27, writer called radiology scheduling and that appt is no longer available.  Pt states understanding, will keep PET on 8/28.    FAISAL ValderramaN, RN  RN Care Coordinator  [facility]

## 2024-08-23 RX ORDER — CALCIUM CARBONATE 500 MG/1
500 TABLET, CHEWABLE ORAL
Status: CANCELLED | OUTPATIENT
Start: 2024-08-23

## 2024-08-26 ENCOUNTER — HOSPITAL ENCOUNTER (OUTPATIENT)
Dept: LAB | Facility: CLINIC | Age: 64
Discharge: HOME | End: 2024-08-26
Attending: STUDENT IN AN ORGANIZED HEALTH CARE EDUCATION/TRAINING PROGRAM | Admitting: STUDENT IN AN ORGANIZED HEALTH CARE EDUCATION/TRAINING PROGRAM
Payer: COMMERCIAL

## 2024-08-26 VITALS
HEART RATE: 72 BPM | DIASTOLIC BLOOD PRESSURE: 57 MMHG | SYSTOLIC BLOOD PRESSURE: 97 MMHG | RESPIRATION RATE: 18 BRPM | TEMPERATURE: 98 F

## 2024-08-26 DIAGNOSIS — C84.09 MYCOSIS FUNGOIDES INVOLVING EXTRANODAL SITE EXCLUDING SPLEEN AND OTHER SOLID ORGANS (H): ICD-10-CM

## 2024-08-26 LAB
ALBUMIN SERPL BCG-MCNC: 4 G/DL (ref 3.5–5.2)
ALP SERPL-CCNC: 78 U/L (ref 40–150)
ALT SERPL W P-5'-P-CCNC: 14 U/L (ref 0–70)
ANION GAP SERPL CALCULATED.3IONS-SCNC: 10 MMOL/L (ref 7–15)
AST SERPL W P-5'-P-CCNC: 20 U/L (ref 0–45)
BASOPHILS # BLD AUTO: ABNORMAL 10*3/UL
BASOPHILS # BLD MANUAL: 0 10E3/UL (ref 0–0.2)
BASOPHILS NFR BLD AUTO: ABNORMAL %
BASOPHILS NFR BLD MANUAL: 0 %
BILIRUB SERPL-MCNC: 0.9 MG/DL
BUN SERPL-MCNC: 11.9 MG/DL (ref 8–23)
CALCIUM SERPL-MCNC: 9.1 MG/DL (ref 8.8–10.4)
CHLORIDE SERPL-SCNC: 102 MMOL/L (ref 98–107)
CREAT SERPL-MCNC: 0.88 MG/DL (ref 0.67–1.17)
EGFRCR SERPLBLD CKD-EPI 2021: >90 ML/MIN/1.73M2
EOSINOPHIL # BLD AUTO: ABNORMAL 10*3/UL
EOSINOPHIL # BLD MANUAL: 0 10E3/UL (ref 0–0.7)
EOSINOPHIL NFR BLD AUTO: ABNORMAL %
EOSINOPHIL NFR BLD MANUAL: 0 %
ERYTHROCYTE [DISTWIDTH] IN BLOOD BY AUTOMATED COUNT: 14.4 % (ref 10–15)
GLUCOSE SERPL-MCNC: 94 MG/DL (ref 70–99)
HCO3 SERPL-SCNC: 25 MMOL/L (ref 22–29)
HCT VFR BLD AUTO: 40 % (ref 40–53)
HGB BLD-MCNC: 13.2 G/DL (ref 13.3–17.7)
IMM GRANULOCYTES # BLD: ABNORMAL 10*3/UL
IMM GRANULOCYTES NFR BLD: ABNORMAL %
LDH SERPL L TO P-CCNC: 213 U/L (ref 0–250)
LYMPHOCYTES # BLD AUTO: ABNORMAL 10*3/UL
LYMPHOCYTES # BLD MANUAL: 0.8 10E3/UL (ref 0.8–5.3)
LYMPHOCYTES NFR BLD AUTO: ABNORMAL %
LYMPHOCYTES NFR BLD MANUAL: 13 %
MCH RBC QN AUTO: 30.8 PG (ref 26.5–33)
MCHC RBC AUTO-ENTMCNC: 33 G/DL (ref 31.5–36.5)
MCV RBC AUTO: 93 FL (ref 78–100)
MONOCYTES # BLD AUTO: ABNORMAL 10*3/UL
MONOCYTES # BLD MANUAL: 0.7 10E3/UL (ref 0–1.3)
MONOCYTES NFR BLD AUTO: ABNORMAL %
MONOCYTES NFR BLD MANUAL: 10 %
MYELOCYTES # BLD MANUAL: 0.1 10E3/UL
MYELOCYTES NFR BLD MANUAL: 1 %
NEUTROPHILS # BLD AUTO: ABNORMAL 10*3/UL
NEUTROPHILS # BLD MANUAL: 4.9 10E3/UL (ref 1.6–8.3)
NEUTROPHILS NFR BLD AUTO: ABNORMAL %
NEUTROPHILS NFR BLD MANUAL: 76 %
NRBC # BLD AUTO: 0 10E3/UL
NRBC BLD AUTO-RTO: 0 /100
PLAT MORPH BLD: ABNORMAL
PLATELET # BLD AUTO: 281 10E3/UL (ref 150–450)
POTASSIUM SERPL-SCNC: 4.1 MMOL/L (ref 3.4–5.3)
PROT SERPL-MCNC: 6.6 G/DL (ref 6.4–8.3)
RBC # BLD AUTO: 4.29 10E6/UL (ref 4.4–5.9)
RBC MORPH BLD: ABNORMAL
SODIUM SERPL-SCNC: 137 MMOL/L (ref 135–145)
WBC # BLD AUTO: 6.5 10E3/UL (ref 4–11)

## 2024-08-26 PROCEDURE — 80053 COMPREHEN METABOLIC PANEL: CPT

## 2024-08-26 PROCEDURE — 85014 HEMATOCRIT: CPT

## 2024-08-26 PROCEDURE — 83615 LACTATE (LD) (LDH) ENZYME: CPT

## 2024-08-26 PROCEDURE — 250N000009 HC RX 250: Performed by: PATHOLOGY

## 2024-08-26 PROCEDURE — 36415 COLL VENOUS BLD VENIPUNCTURE: CPT

## 2024-08-26 PROCEDURE — 250N000011 HC RX IP 250 OP 636: Performed by: PATHOLOGY

## 2024-08-26 PROCEDURE — 85007 BL SMEAR W/DIFF WBC COUNT: CPT

## 2024-08-26 PROCEDURE — 36522 PHOTOPHERESIS: CPT

## 2024-08-26 RX ADMIN — ANTICOAGULANT CITRATE DEXTROSE SOLUTION FORMULA A 150 ML: 12.25; 11; 3.65 SOLUTION INTRAVENOUS at 12:28

## 2024-08-26 RX ADMIN — HEPARIN SODIUM 10 ML: 1000 INJECTION INTRAVENOUS; SUBCUTANEOUS at 12:28

## 2024-08-26 NOTE — PROCEDURES
Transfusion Medicine  Apheresis Procedure Note    Blane Ugalde [id number]   YOB: 1960 Age: 63 year old         Procedure:  Extracorporeal Photopheresis (ECP)           Assessment and Plan:     63 year old male with history of cutaneous T cell lymphoma (mycosis fungoides)/Sézary syndrome who  is undergoing extracorporeal photopheresis therapy.  He is tolerating the procedure well.  He notes overall feeling well today.  Denies nausea, vomiting, fevers, chills, diarrhea. No significant changes to his skin at this point.       His next scheduled procedure is tomorrow.  Continue with plan per his clinical team.              History of Present Illness:   Blane Ugalde is a 63 year old male with an h/o hyperlipidemia. a prothrombin gene mutation.  cutaneous T-cell lymphoma (CTCL)/Sézary syndrome. To summarize his course, he had a skin rash involving the arms that waxed an waned since about 2011. It progressed to involve his torso. Rash was raised, red/purple in color, and intermittently itched and basically did not improve with topical creams.  2 skin biopsies in 2021 were non-diagnostic, but one performed 01/2024 was felt to be most consistent with MF.   ECP treatment was started in March 2024.      The plan is to perform 2 procedures per month on two consecutive days              Past Medical History:   No past medical history on file.  CTCL          Past Surgical History:     Past Surgical History:   Procedure Laterality Date    APPENDECTOMY      IR LYMPH NODE BIOPSY  7/2/2024             Allergies:   No Known Allergies          Medications:     Current Outpatient Medications   Medication Sig Dispense Refill    acetaminophen (TYLENOL) 325 MG tablet Take 650 mg by mouth      cetirizine (ZYRTEC) 10 MG tablet Take 10 mg by mouth      clobetasol (TEMOVATE) 0.05 % external ointment Apply topically 2 times daily 60 g 3    clobetasol propionate (CLOBEX) 0.05 % external shampoo       folic acid (FOLVITE)  1 MG tablet Take 1 tablet (1 mg) by mouth daily , except on days that you take methotrexate 26 tablet 11    hydrOXYzine HCl (ATARAX) 25 MG tablet Take 1-2 tablets (25-50 mg) by mouth 3 times daily as needed for itching 60 tablet 2    methotrexate 2.5 MG tablet Take 10 tablets (25 mg) by mouth every 7 days . Take the same day of the week. 40 tablet 5    omeprazole (PRILOSEC) 10 MG DR capsule Take 20 mg by mouth daily      pseudoePHEDrine (SUDAFED) 30 MG tablet Take 30 mg by mouth      tazarotene (TAZORAC) 0.1 % external cream Apply to areas of thick scaling on hands and feet daily 60 g 3    triamcinolone (KENALOG) 0.1 % external cream APPLY TO AFFECTED AREA 2 TIMES A DAY FOR 10 DAYS TO ARMS, CHEST, NECK, AND BACK       Current Facility-Administered Medications   Medication Dose Route Frequency Provider Last Rate Last Admin    methoxsalen (photopheresis) SOLN   Apheresis DOES NOT GO TO Rona Kumar MD                 Review of Systems:     See above           Exam:   BP 97/57   Pulse 72   Temp 98  F (36.7  C) (Oral)   Resp 18   Alert, no apparent distress  Breathing appears comfortable on room air  Peripheral IV access for the procedure.             Data:     Results for orders placed or performed during the hospital encounter of 08/26/24   Comprehensive metabolic panel     Status: Normal   Result Value Ref Range    Sodium 137 135 - 145 mmol/L    Potassium 4.1 3.4 - 5.3 mmol/L    Carbon Dioxide (CO2) 25 22 - 29 mmol/L    Anion Gap 10 7 - 15 mmol/L    Urea Nitrogen 11.9 8.0 - 23.0 mg/dL    Creatinine 0.88 0.67 - 1.17 mg/dL    GFR Estimate >90 >60 mL/min/1.73m2    Calcium 9.1 8.8 - 10.4 mg/dL    Chloride 102 98 - 107 mmol/L    Glucose 94 70 - 99 mg/dL    Alkaline Phosphatase 78 40 - 150 U/L    AST 20 0 - 45 U/L    ALT 14 0 - 70 U/L    Protein Total 6.6 6.4 - 8.3 g/dL    Albumin 4.0 3.5 - 5.2 g/dL    Bilirubin Total 0.9 <=1.2 mg/dL   Lactate Dehydrogenase     Status: Normal   Result Value Ref Range     Lactate Dehydrogenase 213 0 - 250 U/L   CBC with platelets and differential     Status: Abnormal   Result Value Ref Range    WBC Count 6.5 4.0 - 11.0 10e3/uL    RBC Count 4.29 (L) 4.40 - 5.90 10e6/uL    Hemoglobin 13.2 (L) 13.3 - 17.7 g/dL    Hematocrit 40.0 40.0 - 53.0 %    MCV 93 78 - 100 fL    MCH 30.8 26.5 - 33.0 pg    MCHC 33.0 31.5 - 36.5 g/dL    RDW 14.4 10.0 - 15.0 %    Platelet Count 281 150 - 450 10e3/uL    % Neutrophils      % Lymphocytes      % Monocytes      % Eosinophils      % Basophils      % Immature Granulocytes      NRBCs per 100 WBC 0 <1 /100    Absolute Neutrophils      Absolute Lymphocytes      Absolute Monocytes      Absolute Eosinophils      Absolute Basophils      Absolute Immature Granulocytes      Absolute NRBCs 0.0 10e3/uL   Manual Differential     Status: Abnormal   Result Value Ref Range    % Neutrophils 76 %    % Lymphocytes 13 %    % Monocytes 10 %    % Eosinophils 0 %    % Basophils 0 %    % Myelocytes 1 %    Absolute Neutrophils 4.9 1.6 - 8.3 10e3/uL    Absolute Lymphocytes 0.8 0.8 - 5.3 10e3/uL    Absolute Monocytes 0.7 0.0 - 1.3 10e3/uL    Absolute Eosinophils 0.0 0.0 - 0.7 10e3/uL    Absolute Basophils 0.0 0.0 - 0.2 10e3/uL    Absolute Myelocytes 0.1 (H) <=0.0 10e3/uL    RBC Morphology Confirmed RBC Indices     Platelet Assessment  Automated Count Confirmed. Platelet morphology is normal.     Automated Count Confirmed. Platelet morphology is normal.   CBC with Platelets & Differential     Status: Abnormal    Narrative    The following orders were created for panel order CBC with Platelets & Differential.  Procedure                               Abnormality         Status                     ---------                               -----------         ------                     CBC with platelets and d...[[id number]]  Abnormal            Final result               Manual Differential[[id number]]          Abnormal            Final result                 Please view results for these  tests on the individual orders.                Procedure Summary:   Extracorporeal photopheresis was performed on a ClassPass device. Peripheral IV was used for access. Heparinized saline was used to prime the circuit and ACD-A was used during the procedure for anticoagulation.  The patient's vital signs were stable throughout.  The patient tolerated the procedure well without complication.  See apheresis flowsheet for additional details.     Attestation: During the procedure the patient was directly seen and evaluated by me, Alexa Alford MD.  I have reviewed the chart and pertinent laboratory findings, and discussed the patient and the current procedure with the Apheresis nursing staff.    Alexa Alford MD  Transfusion Medicine Attending  Laboratory Medicine & Pathology

## 2024-08-27 ENCOUNTER — HOSPITAL ENCOUNTER (OUTPATIENT)
Dept: LAB | Facility: CLINIC | Age: 64
Discharge: HOME | End: 2024-08-27
Attending: INTERNAL MEDICINE | Admitting: INTERNAL MEDICINE
Payer: COMMERCIAL

## 2024-08-27 VITALS
HEART RATE: 57 BPM | SYSTOLIC BLOOD PRESSURE: 107 MMHG | TEMPERATURE: 97.6 F | RESPIRATION RATE: 18 BRPM | DIASTOLIC BLOOD PRESSURE: 62 MMHG

## 2024-08-27 PROCEDURE — 250N000011 HC RX IP 250 OP 636: Performed by: PATHOLOGY

## 2024-08-27 PROCEDURE — 36522 PHOTOPHERESIS: CPT

## 2024-08-27 PROCEDURE — 250N000009 HC RX 250: Performed by: PATHOLOGY

## 2024-08-27 RX ADMIN — HEPARIN SODIUM 10 ML: 1000 INJECTION INTRAVENOUS; SUBCUTANEOUS at 08:32

## 2024-08-27 RX ADMIN — ANTICOAGULANT CITRATE DEXTROSE SOLUTION FORMULA A 147 ML: 12.25; 11; 3.65 SOLUTION INTRAVENOUS at 08:32

## 2024-08-27 NOTE — PROCEDURES
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  He is tolerating the procedure well and says that overall he is feeling well today.  Denies nausea, vomiting, fevers, chills, diarrhea. No significant changes to his skin at this point.       He is uncertain whether he will come for ECP monthly or every two weeks.  He will be meeting with his clinical team to make a plan.              History of Present Illness:   Blane Ugalde is a 63 year old male with an h/o hyperlipidemia. a prothrombin gene mutation.  cutaneous T-cell lymphoma (CTCL)/Sézary syndrome. To summarize his course, he had a skin rash involving the arms that waxed an waned since about 2011. It progressed to involve his torso. Rash was raised, red/purple in color, and intermittently itched and basically did not improve with topical creams.  2 skin biopsies in 2021 were non-diagnostic, but one performed 01/2024 was felt to be most consistent with MF.   ECP treatment was started in March 2024.      The plan is to perform 2 procedures per month on two consecutive days              Past Medical History:   No past medical history on file.  CTCL          Past Surgical History:     Past Surgical History:   Procedure Laterality Date    APPENDECTOMY      IR LYMPH NODE BIOPSY  7/2/2024             Allergies:   No Known Allergies          Medications:     Current Outpatient Medications   Medication Sig Dispense Refill    acetaminophen (TYLENOL) 325 MG tablet Take 650 mg by mouth      cetirizine (ZYRTEC) 10 MG tablet Take 10 mg by mouth      clobetasol (TEMOVATE) 0.05 % external ointment Apply topically 2 times daily 60 g 3    clobetasol propionate  (CLOBEX) 0.05 % external shampoo       folic acid (FOLVITE) 1 MG tablet Take 1 tablet (1 mg) by mouth daily , except on days that you take methotrexate 26 tablet 11    hydrOXYzine HCl (ATARAX) 25 MG tablet Take 1-2 tablets (25-50 mg) by mouth 3 times daily as needed for itching 60 tablet 2    methotrexate 2.5 MG tablet Take 10 tablets (25 mg) by mouth every 7 days . Take the same day of the week. 40 tablet 5    omeprazole (PRILOSEC) 10 MG DR capsule Take 20 mg by mouth daily      pseudoePHEDrine (SUDAFED) 30 MG tablet Take 30 mg by mouth      tazarotene (TAZORAC) 0.1 % external cream Apply to areas of thick scaling on hands and feet daily 60 g 3    triamcinolone (KENALOG) 0.1 % external cream APPLY TO AFFECTED AREA 2 TIMES A DAY FOR 10 DAYS TO ARMS, CHEST, NECK, AND BACK       Current Facility-Administered Medications   Medication Dose Route Frequency Provider Last Rate Last Admin    methoxsalen (photopheresis) SOLN   Apheresis DOES NOT GO TO Alexa Marinelli MD                 Review of Systems:     See above           Exam:   /62   Pulse 57   Temp 97.6  F (36.4  C) (Oral)   Resp 18   Alert, no apparent distress  Breathing appears comfortable on room air  Peripheral IV access for the procedure.             Data:     No results found for any visits on 08/27/24.               Procedure Summary:   Extracorporeal photopheresis was performed on a DocuTAP device. Peripheral IV was used for access. Heparinized saline was used to prime the circuit and ACD-A was used during the procedure for anticoagulation.  The patient's vital signs were stable throughout.  The patient tolerated the procedure well without complication.  See apheresis flowsheet for additional details.     Attestation: During the procedure the patient was directly seen and evaluated by me, Alexa Alford MD.  I have reviewed the chart and pertinent laboratory findings, and discussed the patient and the current procedure with the Apheresis  nursing staff.    Alexa Alford MD  Transfusion Medicine Attending  Laboratory Medicine & Pathology

## 2024-08-27 NOTE — DISCHARGE INSTRUCTIONS
Photopheresis:  Avoid sunlight , and wear UVA-protective, full coverage sunglasses and sunscreen SPF 15 or higher  for 24 hours after your treatment.  The drug used in your treatment makes patients more sensitive to sunlight for about 24 hours after the treatment.  Apheresis Blood Donor Center Post Instructions  You may feel tired after your procedure today.   Please call your doctor if you have:  bleeding that doesn t stop, fever, pain where a needle or tube (catheter) was placed, seizures, trouble breathing, red urine, nausea or vomiting, other health concerns.     If your symptoms are severe, call 911.  If your veins were used, keep the bandages on for 2-4 hours.  Avoid heavy lifting with your arms.  If bleeding occurs from these sites, apply firm pressure for 5-10 minutes.  Call your physician if bleeding continues.    The Apheresis/Blood Donor Center is open Monday-Friday 7:30 a.m. to 5 p.m.  The phone number is [phone].  A Transfusion Medicine physician can be reached after 5:00 p.m. weekdays and on weekends /Holidays by calling [phone], and asking for the physician on call.

## 2024-08-28 ENCOUNTER — HOSPITAL ENCOUNTER (OUTPATIENT)
Dept: PET IMAGING | Facility: CLINIC | Age: 64
Discharge: HOME | End: 2024-08-28
Attending: INTERNAL MEDICINE
Payer: COMMERCIAL

## 2024-08-28 DIAGNOSIS — C84.18 SEZARY DISEASE INVOLVING LYMPH NODES OF MULTIPLE REGIONS (H): ICD-10-CM

## 2024-08-28 DIAGNOSIS — C84.09 MYCOSIS FUNGOIDES INVOLVING EXTRANODAL SITE EXCLUDING SPLEEN AND OTHER SOLID ORGANS (H): ICD-10-CM

## 2024-08-28 PROCEDURE — 78816 PET IMAGE W/CT FULL BODY: CPT | Mod: PI

## 2024-08-28 PROCEDURE — 250N000011 HC RX IP 250 OP 636: Performed by: INTERNAL MEDICINE

## 2024-08-28 PROCEDURE — 70491 CT SOFT TISSUE NECK W/DYE: CPT | Mod: 26 | Performed by: RADIOLOGY

## 2024-08-28 PROCEDURE — A9552 F18 FDG: HCPCS | Performed by: INTERNAL MEDICINE

## 2024-08-28 PROCEDURE — 70491 CT SOFT TISSUE NECK W/DYE: CPT

## 2024-08-28 PROCEDURE — 78816 PET IMAGE W/CT FULL BODY: CPT | Mod: 26 | Performed by: RADIOLOGY

## 2024-08-28 PROCEDURE — 74177 CT ABD & PELVIS W/CONTRAST: CPT | Mod: 26 | Performed by: RADIOLOGY

## 2024-08-28 PROCEDURE — 71260 CT THORAX DX C+: CPT

## 2024-08-28 PROCEDURE — 343N000001 HC RX 343: Performed by: INTERNAL MEDICINE

## 2024-08-28 PROCEDURE — 71260 CT THORAX DX C+: CPT | Mod: 26 | Performed by: RADIOLOGY

## 2024-08-28 RX ORDER — FLUDEOXYGLUCOSE F 18 200 MCI/ML
10-18 INJECTION, SOLUTION INTRAVENOUS ONCE
Status: COMPLETED | OUTPATIENT
Start: 2024-08-28 | End: 2024-08-28

## 2024-08-28 RX ORDER — IOPAMIDOL 755 MG/ML
10-135 INJECTION, SOLUTION INTRAVASCULAR ONCE
Status: COMPLETED | OUTPATIENT
Start: 2024-08-28 | End: 2024-08-28

## 2024-08-28 RX ADMIN — IOPAMIDOL 104 ML: 755 INJECTION, SOLUTION INTRAVENOUS at 08:16

## 2024-08-28 RX ADMIN — FLUDEOXYGLUCOSE F 18 10.14 MILLICURIE: 200 INJECTION, SOLUTION INTRAVENOUS at 07:10

## 2024-08-30 ENCOUNTER — PATIENT OUTREACH (OUTPATIENT)
Dept: ONCOLOGY | Facility: CLINIC | Age: 64
End: 2024-08-30
Payer: COMMERCIAL

## 2024-08-30 ENCOUNTER — TELEPHONE (OUTPATIENT)
Dept: ONCOLOGY | Facility: CLINIC | Age: 64
End: 2024-08-30
Payer: COMMERCIAL

## 2024-08-30 DIAGNOSIS — C84.18 SEZARY DISEASE INVOLVING LYMPH NODES OF MULTIPLE REGIONS (H): Primary | ICD-10-CM

## 2024-08-30 DIAGNOSIS — C84.09 MYCOSIS FUNGOIDES INVOLVING EXTRANODAL SITE EXCLUDING SPLEEN AND OTHER SOLID ORGANS (H): ICD-10-CM

## 2024-08-30 NOTE — TELEPHONE ENCOUNTER
Spoke with Blane by phone to review PET/CT results. Diffuse lymph nodes enlargement with SUV in the 5-6 range, no evidence of transformation, right parotid lesion likely lymph node. I suggested holding off US and biopsy of parotid lesion unless it persists/progresses as it is like related to lymphoma. We will repeat imaging prior to our visit in November and he will keep us posted on how things are going or if questions or new issues come up.    Roberto Everett MD, PhD

## 2024-08-30 NOTE — PROGRESS NOTES
[facility]: Cancer Care                                                                                          Pt requesting to complete CT in November closer to home.  CT orders faxed to [facility] Oncology clinic, confirmed fax today at 1000.  My chart message sent to pt to update that orders were sent and to check in post chemo yesterday.    Taylor Rubi, FAISALN, RN  RN Care Coordinator  [facility]

## 2024-09-23 RX ORDER — CALCIUM CARBONATE 500 MG/1
500 TABLET, CHEWABLE ORAL
Status: CANCELLED | OUTPATIENT
Start: 2024-09-23

## 2024-09-24 ENCOUNTER — HOSPITAL ENCOUNTER (OUTPATIENT)
Dept: LAB | Facility: CLINIC | Age: 64
Discharge: HOME | End: 2024-09-24
Attending: STUDENT IN AN ORGANIZED HEALTH CARE EDUCATION/TRAINING PROGRAM | Admitting: STUDENT IN AN ORGANIZED HEALTH CARE EDUCATION/TRAINING PROGRAM
Payer: COMMERCIAL

## 2024-09-24 ENCOUNTER — OFFICE VISIT (OUTPATIENT)
Dept: DERMATOLOGY | Facility: CLINIC | Age: 64
End: 2024-09-24
Payer: COMMERCIAL

## 2024-09-24 ENCOUNTER — LAB (OUTPATIENT)
Dept: LAB | Facility: CLINIC | Age: 64
End: 2024-09-24
Payer: COMMERCIAL

## 2024-09-24 VITALS
TEMPERATURE: 97.6 F | SYSTOLIC BLOOD PRESSURE: 105 MMHG | HEART RATE: 56 BPM | RESPIRATION RATE: 18 BRPM | DIASTOLIC BLOOD PRESSURE: 61 MMHG

## 2024-09-24 DIAGNOSIS — Z79.899 ENCOUNTER FOR LONG-TERM (CURRENT) USE OF HIGH-RISK MEDICATION: ICD-10-CM

## 2024-09-24 DIAGNOSIS — C84.04 MYCOSIS FUNGOIDES INVOLVING LYMPH NODES OF AXILLA (H): ICD-10-CM

## 2024-09-24 DIAGNOSIS — C84.04 MYCOSIS FUNGOIDES INVOLVING LYMPH NODES OF AXILLA (H): Primary | ICD-10-CM

## 2024-09-24 DIAGNOSIS — R21 RASH: ICD-10-CM

## 2024-09-24 LAB
ALBUMIN SERPL BCG-MCNC: 4.1 G/DL (ref 3.5–5.2)
ALBUMIN UR-MCNC: 10 MG/DL
ALP SERPL-CCNC: 87 U/L (ref 40–150)
ALT SERPL W P-5'-P-CCNC: 20 U/L (ref 0–70)
ANION GAP SERPL CALCULATED.3IONS-SCNC: 9 MMOL/L (ref 7–15)
APPEARANCE UR: CLEAR
AST SERPL W P-5'-P-CCNC: 25 U/L (ref 0–45)
BASOPHILS # BLD AUTO: 0.1 10E3/UL (ref 0–0.2)
BASOPHILS NFR BLD AUTO: 1 %
BILIRUB SERPL-MCNC: 1 MG/DL
BILIRUB UR QL STRIP: NEGATIVE
BUN SERPL-MCNC: 8.3 MG/DL (ref 8–23)
CALCIUM SERPL-MCNC: 9.4 MG/DL (ref 8.8–10.4)
CHLORIDE SERPL-SCNC: 101 MMOL/L (ref 98–107)
COLOR UR AUTO: ABNORMAL
CREAT SERPL-MCNC: 0.82 MG/DL (ref 0.67–1.17)
EGFRCR SERPLBLD CKD-EPI 2021: >90 ML/MIN/1.73M2
EOSINOPHIL # BLD AUTO: 0.1 10E3/UL (ref 0–0.7)
EOSINOPHIL NFR BLD AUTO: 1 %
ERYTHROCYTE [DISTWIDTH] IN BLOOD BY AUTOMATED COUNT: 13.4 % (ref 10–15)
GLUCOSE SERPL-MCNC: 89 MG/DL (ref 70–99)
GLUCOSE UR STRIP-MCNC: NEGATIVE MG/DL
HCO3 SERPL-SCNC: 27 MMOL/L (ref 22–29)
HCT VFR BLD AUTO: 41.2 % (ref 40–53)
HGB BLD-MCNC: 13.9 G/DL (ref 13.3–17.7)
HGB UR QL STRIP: NEGATIVE
HYALINE CASTS: 1 /LPF
IMM GRANULOCYTES # BLD: 0 10E3/UL
IMM GRANULOCYTES NFR BLD: 0 %
KETONES UR STRIP-MCNC: ABNORMAL MG/DL
LEUKOCYTE ESTERASE UR QL STRIP: NEGATIVE
LYMPHOCYTES # BLD AUTO: 0.5 10E3/UL (ref 0.8–5.3)
LYMPHOCYTES NFR BLD AUTO: 10 %
MCH RBC QN AUTO: 29.8 PG (ref 26.5–33)
MCHC RBC AUTO-ENTMCNC: 33.7 G/DL (ref 31.5–36.5)
MCV RBC AUTO: 88 FL (ref 78–100)
MONOCYTES # BLD AUTO: 0.7 10E3/UL (ref 0–1.3)
MONOCYTES NFR BLD AUTO: 15 %
MUCOUS THREADS #/AREA URNS LPF: PRESENT /LPF
NEUTROPHILS # BLD AUTO: 3.3 10E3/UL (ref 1.6–8.3)
NEUTROPHILS NFR BLD AUTO: 72 %
NITRATE UR QL: NEGATIVE
NRBC # BLD AUTO: 0 10E3/UL
NRBC BLD AUTO-RTO: 0 /100
PH UR STRIP: 7.5 [PH] (ref 5–7)
PLATELET # BLD AUTO: 226 10E3/UL (ref 150–450)
POTASSIUM SERPL-SCNC: 4.5 MMOL/L (ref 3.4–5.3)
PROT SERPL-MCNC: 7.1 G/DL (ref 6.4–8.3)
RBC # BLD AUTO: 4.67 10E6/UL (ref 4.4–5.9)
RBC URINE: <1 /HPF
SODIUM SERPL-SCNC: 137 MMOL/L (ref 135–145)
SP GR UR STRIP: 1.02 (ref 1–1.03)
SQUAMOUS EPITHELIAL: <1 /HPF
UROBILINOGEN UR STRIP-MCNC: NORMAL MG/DL
WBC # BLD AUTO: 4.5 10E3/UL (ref 4–11)
WBC URINE: <1 /HPF

## 2024-09-24 PROCEDURE — 11104 PUNCH BX SKIN SINGLE LESION: CPT | Mod: GC | Performed by: DERMATOLOGY

## 2024-09-24 PROCEDURE — 250N000011 HC RX IP 250 OP 636

## 2024-09-24 PROCEDURE — 250N000009 HC RX 250

## 2024-09-24 PROCEDURE — 36522 PHOTOPHERESIS: CPT

## 2024-09-24 PROCEDURE — 99213 OFFICE O/P EST LOW 20 MIN: CPT | Mod: 25 | Performed by: DERMATOLOGY

## 2024-09-24 PROCEDURE — 85049 AUTOMATED PLATELET COUNT: CPT

## 2024-09-24 PROCEDURE — 88341 IMHCHEM/IMCYTCHM EA ADD ANTB: CPT | Mod: 26 | Performed by: DERMATOLOGY

## 2024-09-24 PROCEDURE — 81001 URINALYSIS AUTO W/SCOPE: CPT | Performed by: PATHOLOGY

## 2024-09-24 PROCEDURE — 82040 ASSAY OF SERUM ALBUMIN: CPT

## 2024-09-24 PROCEDURE — 88360 TUMOR IMMUNOHISTOCHEM/MANUAL: CPT | Mod: 26 | Performed by: DERMATOLOGY

## 2024-09-24 PROCEDURE — 36415 COLL VENOUS BLD VENIPUNCTURE: CPT

## 2024-09-24 PROCEDURE — 88341 IMHCHEM/IMCYTCHM EA ADD ANTB: CPT | Mod: TC | Performed by: DERMATOLOGY

## 2024-09-24 PROCEDURE — 88305 TISSUE EXAM BY PATHOLOGIST: CPT | Mod: 26 | Performed by: DERMATOLOGY

## 2024-09-24 PROCEDURE — 88342 IMHCHEM/IMCYTCHM 1ST ANTB: CPT | Mod: 26 | Performed by: DERMATOLOGY

## 2024-09-24 RX ORDER — CALCIUM CARBONATE 500 MG/1
500 TABLET, CHEWABLE ORAL
Status: CANCELLED | OUTPATIENT
Start: 2024-09-24

## 2024-09-24 RX ORDER — NALTREXONE HYDROCHLORIDE 50 MG/1
25 TABLET, FILM COATED ORAL DAILY
Qty: 30 TABLET | Refills: 3 | Status: SHIPPED | OUTPATIENT
Start: 2024-09-24

## 2024-09-24 RX ORDER — CLOBETASOL PROPIONATE 0.5 MG/G
OINTMENT TOPICAL 2 TIMES DAILY
Qty: 60 G | Refills: 4 | Status: SHIPPED | OUTPATIENT
Start: 2024-09-24

## 2024-09-24 RX ADMIN — HEPARIN SODIUM 10 ML: 1000 INJECTION INTRAVENOUS; SUBCUTANEOUS at 12:48

## 2024-09-24 RX ADMIN — ANTICOAGULANT CITRATE DEXTROSE SOLUTION FORMULA A 149 ML: 12.25; 11; 3.65 SOLUTION INTRAVENOUS at 12:48

## 2024-09-24 ASSESSMENT — PAIN SCALES - GENERAL: PAINLEVEL: NO PAIN (0)

## 2024-09-24 NOTE — PROGRESS NOTES
[facility] Dermatology Note  Encounter Date: Sep 24, 2024  Office Visit     Dermatology Problem List:  #MF stage IVA2; methotrexate 25mg/weekly, ECP, nbUVB (Treatment considerations include [place]  -Erythrodermic, flow with 500 Sezary cells, CT scan with 9 mm right axillary lymph node    CT scan 02/01/24  IMPRESSION:   1. Prominent, rounded but not pathologically enlarged RIGHT axillary lymph nodes indeterminate for lymphomatous involvement.   2.  No additional pathologically enlarged lymph nodes within the chest, abdomen or pelvis.   3.  Hepatic steatosis.   8.  Enlarged main pulmonary artery can be seen with pulmonary arterial hypertension.      - 02/27/2024 discussed treatment options plan to proceed with ECP in agreement with oncologist, start methotrexate 25 mg weekly with folic acid, assess response in 3 months keeping in mind that ECP has maximum results sometimes up to 6 months.  Continue home light boost therapy, not titrating up dose recommended finding the instruction manual for his light unit and titrating up as directed. Start bleach baths  - 04/28/24 enlarged but still nominally normal LN in axilla, new inguinal LAD  - 05/01/24 Eval'd by onc, enlarged LN noted discussed careful monitoring w/ consideration of future imaging and bx  - 05/28/24 after discussion w/ pt they would prefer US of lN for size/structure eval. IR referral for bx L LN. Consider transition from methotrexate to bexarotene or IFN. Updated Onc.   - 07/02/2 LN + for MF (no LCT). Now stage IVA2   - 07/17/24 discussed Ania vs IFN  - 07/23/24 2nd opinion w/ [place] who recommended mogamulizumab  - 07/30/24 Discussed risks and benefits for treatment options: methotrexate, interferon,  bexarotine, mogamolizumab, and photopheresis and possible adverse effects. Will speak w/ [place]  - 8/29/2024  Started Mogamulizumab Qweek, ECP monthly.        ____________________________________________    Assessment & Plan:     # Pink circular papules on the bilateral lower extremities  Differential diagnosis includes vasculitis, drug eruption, or dermatitis. Timeline raises concern for reaction ro mogamolizumab. Will perform punch biopsy as below to help better determine etiology. Given vasculitis is in differential, we will also obtain a UA to rule out significant heaturia/proteinuria which would raise concern for HSP. In the interim, we recommend clobetasol PRN for the rash and naltrexone for itching. Patient expressed understanding with the plan.  - Naltrexone 25 mg PO Qday  - Clobetasol 0.05% ointment BID  - Urinalysis  - Punch biopsy (see below)      Procedures Performed:   - Punch biopsy procedure note, location(s): Left leg. After discussion of benefits and risks including but not limited to bleeding, infection, scar, incomplete removal, recurrence, and non-diagnostic biopsy, verbak consent and photographs were obtained. The area was cleaned with isopropyl alcohol. 0.5mL of 1% lidocaine with epinephrine was injected to obtain adequate anesthesia and a 4 mm punch biopsy was performed at site(s). 4-0 Prolene sutures were utilized to approximate the epidermal edges. White petrolatum ointment and a bandage was applied to the wound. Explicit verbal and written wound care instructions were provided. The patient left the dermatology clinic in good condition.       Follow-up: pending path results    Staff and Resident:     Christo Roberts MD  Internal Medicine - Dermatology (PGY-3)   ____________________________________________    CC: Derm Problem (Blane is here today for a follow up for the itching and rash- improving a little bit.)    HPI:  Mr. Blane Ugalde is a(n) 63 year old male with hx MF Stage IVA1 with erythroderma, presenting to clinic for evaluation of lower extremity rash. Patient has history of MF Stage IVA1 with erythroderma (500 sezary cells).  He is managed by heme/onc and dermatology, and is on a regimen that includes mogalizumab weekly, and ECP monthly. Rash on the leg statd 2 weeks ago (after 2nd dose of mogamolizumab). Rash is pruritic, but not painful for him. No joint pain, muscle aches, or hematuria. Patient discussed with dermatology resident on call, who recommended topical clobetasol use. He has been using this for the legs, and feels that has helped with alleviating the rash somewhat.       Labs Reviewed:  N/A    Physical Exam:  Vitals: There were no vitals taken for this visit.  SKIN: Focused examination of trunk and extremities was performed.  - Widespread red erythema on the trunk most predominant on the back  - On the bilateral calves there are numerous pink papules, more predominant on the right side. Appear fewer in number and prominence today compared to images submitted by patient on 9/17.  - No other lesions of concern on areas examined.           Medications:  Current Outpatient Medications   Medication Sig Dispense Refill    acetaminophen (TYLENOL) 325 MG tablet Take 650 mg by mouth      cetirizine (ZYRTEC) 10 MG tablet Take 10 mg by mouth      clobetasol (TEMOVATE) 0.05 % external ointment Apply topically 2 times daily 60 g 3    clobetasol propionate (CLOBEX) 0.05 % external shampoo       folic acid (FOLVITE) 1 MG tablet Take 1 tablet (1 mg) by mouth daily , except on days that you take methotrexate 26 tablet 11    hydrOXYzine HCl (ATARAX) 25 MG tablet Take 1-2 tablets (25-50 mg) by mouth 3 times daily as needed for itching 60 tablet 2    methotrexate 2.5 MG tablet Take 10 tablets (25 mg) by mouth every 7 days . Take the same day of the week. 40 tablet 5    omeprazole (PRILOSEC) 10 MG DR capsule Take 20 mg by mouth daily      pseudoePHEDrine (SUDAFED) 30 MG tablet Take 30 mg by mouth      tazarotene (TAZORAC) 0.1 % external cream Apply to areas of thick scaling on hands and feet daily 60 g 3    triamcinolone (KENALOG) 0.1 % external  cream APPLY TO AFFECTED AREA 2 TIMES A DAY FOR 10 DAYS TO ARMS, CHEST, NECK, AND BACK       No current facility-administered medications for this visit.      Past Medical History:   Patient Active Problem List   Diagnosis    Mycosis fungoides involving extranodal site excluding spleen and other solid organs (H)    Sezary disease involving lymph nodes of multiple regions (H)     No past medical history on file.    CC Referred Self, MD  No address on file on close of this encounter.

## 2024-09-24 NOTE — PROCEDURES
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  He is tolerating the procedure well.  He notes overall feeling well today.  Denies nausea, vomiting, fevers, chills, diarrhea. No significant changes to his skin at this point but he reports possible improvement.       His next scheduled procedure is tomorrow.  Continue with plan per his clinical team.              History of Present Illness:   Blane Ugalde is a 63 year old male with an h/o hyperlipidemia. a prothrombin gene mutation.  cutaneous T-cell lymphoma (CTCL)/Sézary syndrome. To summarize his course, he had a skin rash involving the arms that waxed an waned since about 2011. It progressed to involve his torso. Rash was raised, red/purple in color, and intermittently itched and basically did not improve with topical creams.  2 skin biopsies in 2021 were non-diagnostic, but one performed 01/2024 was felt to be most consistent with MF.   ECP treatment was started in March 2024.      The plan is to perform 2 procedures per month on two consecutive days              Past Medical History:   No past medical history on file.  CTCL          Past Surgical History:     Past Surgical History:   Procedure Laterality Date    APPENDECTOMY      IR LYMPH NODE BIOPSY  7/2/2024             Allergies:   No Known Allergies          Medications:     Current Outpatient Medications   Medication Sig Dispense Refill    acetaminophen (TYLENOL) 325 MG tablet Take 650 mg by mouth      cetirizine (ZYRTEC) 10 MG tablet Take 10 mg by mouth      clobetasol (TEMOVATE) 0.05 % external ointment Apply topically 2 times daily. Apply thin layer to rash on the legs twice daily 60 g 4     clobetasol (TEMOVATE) 0.05 % external ointment Apply topically 2 times daily 60 g 3    clobetasol propionate (CLOBEX) 0.05 % external shampoo       folic acid (FOLVITE) 1 MG tablet Take 1 tablet (1 mg) by mouth daily , except on days that you take methotrexate 26 tablet 11    hydrOXYzine HCl (ATARAX) 25 MG tablet Take 1-2 tablets (25-50 mg) by mouth 3 times daily as needed for itching 60 tablet 2    methotrexate 2.5 MG tablet Take 10 tablets (25 mg) by mouth every 7 days . Take the same day of the week. 40 tablet 5    naltrexone (DEPADE/REVIA) 50 MG tablet Take 0.5 tablets (25 mg) by mouth daily. 30 tablet 3    omeprazole (PRILOSEC) 10 MG DR capsule Take 20 mg by mouth daily      pseudoePHEDrine (SUDAFED) 30 MG tablet Take 30 mg by mouth      tazarotene (TAZORAC) 0.1 % external cream Apply to areas of thick scaling on hands and feet daily 60 g 3    triamcinolone (KENALOG) 0.1 % external cream APPLY TO AFFECTED AREA 2 TIMES A DAY FOR 10 DAYS TO ARMS, CHEST, NECK, AND BACK       Current Facility-Administered Medications   Medication Dose Route Frequency Provider Last Rate Last Admin    methoxsalen (photopheresis) SOLN   Apheresis DOES NOT GO TO Jessica Valerio MD                 Review of Systems:     See above           Exam:   /72   Pulse 53   Temp 97.4  F (36.3  C) (Oral)   Resp 18   Alert, no apparent distress  Breathing appears comfortable on room air  Peripheral IV access for the procedure.             Data:     Results for orders placed or performed during the hospital encounter of 09/24/24   Comprehensive metabolic panel     Status: Normal   Result Value Ref Range    Sodium 137 135 - 145 mmol/L    Potassium 4.5 3.4 - 5.3 mmol/L    Carbon Dioxide (CO2) 27 22 - 29 mmol/L    Anion Gap 9 7 - 15 mmol/L    Urea Nitrogen 8.3 8.0 - 23.0 mg/dL    Creatinine 0.82 0.67 - 1.17 mg/dL    GFR Estimate >90 >60 mL/min/1.73m2    Calcium 9.4 8.8 - 10.4 mg/dL    Chloride 101 98 - 107 mmol/L    Glucose 89 70 - 99  mg/dL    Alkaline Phosphatase 87 40 - 150 U/L    AST 25 0 - 45 U/L    ALT 20 0 - 70 U/L    Protein Total 7.1 6.4 - 8.3 g/dL    Albumin 4.1 3.5 - 5.2 g/dL    Bilirubin Total 1.0 <=1.2 mg/dL   CBC with platelets and differential     Status: Abnormal   Result Value Ref Range    WBC Count 4.5 4.0 - 11.0 10e3/uL    RBC Count 4.67 4.40 - 5.90 10e6/uL    Hemoglobin 13.9 13.3 - 17.7 g/dL    Hematocrit 41.2 40.0 - 53.0 %    MCV 88 78 - 100 fL    MCH 29.8 26.5 - 33.0 pg    MCHC 33.7 31.5 - 36.5 g/dL    RDW 13.4 10.0 - 15.0 %    Platelet Count 226 150 - 450 10e3/uL    % Neutrophils 72 %    % Lymphocytes 10 %    % Monocytes 15 %    % Eosinophils 1 %    % Basophils 1 %    % Immature Granulocytes 0 %    NRBCs per 100 WBC 0 <1 /100    Absolute Neutrophils 3.3 1.6 - 8.3 10e3/uL    Absolute Lymphocytes 0.5 (L) 0.8 - 5.3 10e3/uL    Absolute Monocytes 0.7 0.0 - 1.3 10e3/uL    Absolute Eosinophils 0.1 0.0 - 0.7 10e3/uL    Absolute Basophils 0.1 0.0 - 0.2 10e3/uL    Absolute Immature Granulocytes 0.0 <=0.4 10e3/uL    Absolute NRBCs 0.0 10e3/uL   CBC with platelets differential     Status: Abnormal    Narrative    The following orders were created for panel order CBC with platelets differential.  Procedure                               Abnormality         Status                     ---------                               -----------         ------                     CBC with platelets and d...[[id number]]  Abnormal            Final result                 Please view results for these tests on the individual orders.                Procedure Summary:   Extracorporeal photopheresis was performed on a [place] device. Peripheral IV was used for access. Heparinized saline was used to prime the circuit and ACD-A was used during the procedure for anticoagulation.  The patient's vital signs were stable throughout.  The patient tolerated the procedure well without complication.  See apheresis flowsheet for additional details.     Attestation:  During the procedure the patient was directly seen and evaluated by me, Alexa Alford MD.  I have reviewed the chart and pertinent laboratory findings, and discussed the patient and the current procedure with the Apheresis nursing staff.    Alexa Alford MD  Transfusion Medicine Attending  Laboratory Medicine & Pathology

## 2024-09-24 NOTE — DISCHARGE INSTRUCTIONS
Photopheresis:  Avoid sunlight , and wear UVA-protective, full coverage sunglasses and sunscreen SPF 15 or higher  for 24 hours after your treatment.  The drug used in your treatment makes patients more sensitive to sunlight for about 24 hours after the treatment.  Apheresis Blood Donor Center Post Instructions  You may feel tired after your procedure today.   Please call your doctor if you have:  bleeding that doesn t stop, fever, pain where a needle or tube (catheter) was placed, seizures, trouble breathing, red urine, nausea or vomiting, other health concerns.     If your symptoms are severe, call 911.  If your veins were used, keep the bandages on for 2-4 hours.  Avoid heavy lifting with your arms.  If bleeding occurs from these sites, apply firm pressure for 5-10 minutes.  Call your physician if bleeding continues.    The Apheresis/Blood Donor Center is open Monday-Friday 7:30 a.m. to 5 p.m.  The phone number is [phone].  A Transfusion Medicine physician can be reached after 5:00 p.m. weekdays and on weekends /Holidays by calling [phone], and asking for the physician on call.

## 2024-09-24 NOTE — PATIENT INSTRUCTIONS
Wound Care After a Biopsy    What is a skin biopsy?  A skin biopsy allows the doctor to examine a very small piece of tissue under the microscope to determine the diagnosis and the best treatment for the skin condition. A local anesthetic (numbing medicine) is injected with a very small needle into the skin area to be tested. A small piece of skin is taken from the area. Sometimes a suture (stitch) is used.     What are the risks of a skin biopsy?  I will experience scar, bleeding, swelling, pain, crusting and redness. I may experience incomplete removal or recurrence. Risks of this procedure are excessive bleeding, bruising, infection, nerve damage, numbness, thick (hypertrophic or keloidal) scar and non-diagnostic biopsy.    How should I care for my wound for the first 24 hours?  Keep the wound dry and covered for 24 hours  If it bleeds, hold direct pressure on the area for 15 minutes. If bleeding does not stop, call us or go to the emergency room  Avoid strenuous exercise the first 1-2 days or as your doctor instructs you    How should I care for the wound after 24 hours?  After 24 hours, remove the bandage  You may bathe or shower as normal  If you had a scalp biopsy, you can shampoo as usual and can use shower water to clean the biopsy site daily  Clean the wound once a day with gentle soap and water  Do not scrub, be gentle  Apply white petroleum/Vaseline after cleaning the wound with a cotton swab or a clean finger, and keep the site covered with a Bandaid /bandage. Bandages are not necessary with a scalp biopsy  If you are unable to cover the site with a Bandaid /bandage, re-apply ointment 2-3 times a day to keep the site moist. Moisture will help with healing  Avoid strenuous activity for first 1-2 days  Avoid lakes, rivers, pools, and oceans until the stitches are removed or the site is healed    How do I clean my wound?  Wash hands thoroughly with soap or use hand  before all wound care  Clean  the wound with gentle soap and water  Apply white petroleum/Vaseline  to wound after it is clean  Replace the Bandaid /bandage to keep the wound covered for the first few days or as instructed by your doctor  If you had a scalp biopsy, warm shower water to the area on a daily basis should suffice    What should I use to clean my wound?   Cotton-tipped applicators (Qtips )  White petroleum jelly (Vaseline ). Use a clean new container and use Q-tips to apply.  Bandaids  as needed  Gentle soap     How should I care for my wound long term?  Do not get your wound dirty  Keep up with wound care for one week or until the area is healed.  If you have stitches, stitches need to be removed in *** days. You may return to our clinic for this or you may have it done locally at your doctor s office.  A small scab will form and fall off by itself when the area is completely healed. The area will be red and will become pink in color as it heals. Sun protection is very important for how your scar will turn out. Sunscreen with an SPF 30 or greater is recommended once the area is healed.  You should have some soreness but it should be mild and slowly go away over several days. Talk to your doctor about using tylenol for pain,    When should I call my doctor?  If you have increased:   Pain or swelling  Pus or drainage (clear or slightly yellow drainage is ok)  Temperature over 100F  Spreading redness or warmth around wound    When will I hear about my results?  The biopsy results can take 2 weeks to come back.  Your results will automatically release to [employer] before your provider has even reviewed them.  The clinic will call you with the results, send you a [employer] message, or have you schedule a follow-up clinic or phone time to discuss the results.  Contact our clinics if you do not hear from us in 2 weeks.    Who should I call with questions?  [facility]: [phone]  [facility]  [facility]: [phone]  For urgent needs outside of business hours call the [facility] at [phone] and ask for the dermatology resident on call      Today we did a biopsy of the leg rash. You should also do a urine test at your earliest convenience. We will give you naltrexone and clobetasol for the rash and itching.

## 2024-09-24 NOTE — NURSING NOTE
Dermatology Rooming Note    Blane Ugalde's goals for this visit include:   Chief Complaint   Patient presents with    Derm Problem     Blane is here today for a follow up for the itching and rash- improving a little bit.     ROSALIND Yost - Dermatology

## 2024-09-24 NOTE — Clinical Note
9/24/2024       RE: Blane Ugalde  [address]  [facility] [address]     Dear Colleague,    Thank you for referring your patient, Blane Ugalde, to the [facility] DERMATOLOGY [facility] [place] at [facility]. Please see a copy of my visit note below.    [facility] Dermatology Note  Encounter Date: Sep 24, 2024  Office Visit     Dermatology Problem List:  #MF stage IVA2; methotrexate 25mg/weekly, ECP, nbUVB (Treatment considerations include distance traveled, 300 miles from the [place])  -Erythrodermic, flow with 500 Sezary cells, CT scan with 9 mm right axillary lymph node    CT scan 02/01/24  IMPRESSION:   1. Prominent, rounded but not pathologically enlarged RIGHT axillary lymph nodes indeterminate for lymphomatous involvement.   2.  No additional pathologically enlarged lymph nodes within the chest, abdomen or pelvis.   3.  Hepatic steatosis.   8.  Enlarged main pulmonary artery can be seen with pulmonary arterial hypertension.      - 02/27/2024 discussed treatment options plan to proceed with ECP in agreement with oncologist, start methotrexate 25 mg weekly with folic acid, assess response in 3 months keeping in mind that ECP has maximum results sometimes up to 6 months.  Continue home light boost therapy, not titrating up dose recommended finding the instruction manual for his light unit and titrating up as directed. Start bleach baths  - 04/28/24 enlarged but still nominally normal LN in axilla, new inguinal LAD  - 05/01/24 Eval'd by onc, enlarged LN noted discussed careful monitoring w/ consideration of future imaging and bx  - 05/28/24 after discussion w/ pt they would prefer US of lN for size/structure eval. IR referral for bx L LN. Consider transition from methotrexate to bexarotene or IFN. Updated Onc.   - 07/02/2 LN + for MF (no LCT). Now stage IVA2   - 07/17/24 discussed Ania vs IFN  - 07/23/24 2nd opinion w/ [place] who  recommended mogamulizumab  - 07/30/24 Discussed risks and benefits for treatment options: methotrexate, interferon,  bexarotine, mogamolizumab, and photopheresis and possible adverse effects. Will speak w/ [place]  - 8/29/2024  Started Mogamulizumab Qweek, ECP monthly.       ____________________________________________    Assessment & Plan:     # Pink circular papules on the bilateral lower extremities  Differential diagnosis includes vasculitis, drug eruption, or dermatitis. Timeline raises concern for reaction ro mogamolizumab. Will perform punch biopsy as below to help better determine etiology. Given vasculitis is in differential, we will also obtain a UA to rule out significant heaturia/proteinuria which would raise concern for HSP. In the interim, we recommend clobetasol PRN for the rash and naltrexone for itching. Patient expressed understanding with the plan.  - Naltrexone 25 mg PO Qday  - Clobetasol 0.05% ointment BID  - Urinalysis  - Punch biopsy (see below)      Procedures Performed:   - Punch biopsy procedure note, location(s): Left leg. After discussion of benefits and risks including but not limited to bleeding, infection, scar, incomplete removal, recurrence, and non-diagnostic biopsy, verbak consent and photographs were obtained. The area was cleaned with isopropyl alcohol. 0.5mL of 1% lidocaine with epinephrine was injected to obtain adequate anesthesia and a 4 mm punch biopsy was performed at site(s). 4-0 Prolene sutures were utilized to approximate the epidermal edges. White petrolatum ointment and a bandage was applied to the wound. Explicit verbal and written wound care instructions were provided. The patient left the dermatology clinic in good condition.       Follow-up: pending path results    Staff and Resident:     Christo Roberts MD  Internal Medicine - Dermatology (PGY-3)   ____________________________________________    CC: Derm Problem (Blane is here today for a follow up for the  itching and rash- improving a little bit.)    HPI:  Mr. Blane Ugalde is a(n) 63 year old male with hx MF Stage IVA1 with erythroderma, presenting to clinic for evaluation of lower extremity rash. Patient has history of MF Stage IVA1 with erythroderma (500 sezary cells). He is managed by heme/onc and dermatology, and is on a regimen that includes mogalizumab weekly, and ECP monthly. Rash on the leg statd 2 weeks ago (after 2nd dose of mogamolizumab). Rash is pruritic, but not painful for him. No joint pain, muscle aches, or hematuria. Patient discussed with dermatology resident on call, who recommended topical clobetasol use. He has been using this for the legs, and feels that has helped with alleviating the rash somewhat.       Labs Reviewed:  N/A    Physical Exam:  Vitals: There were no vitals taken for this visit.  SKIN: Focused examination of trunk and extremities was performed.  - Widespread red erythema on the trunk most predominant on the back  - On the bilateral calves there are numerous pink papules, more predominant on the right side. Appear fewer in number and prominence today compared to images submitted by patient on 9/17.  - No other lesions of concern on areas examined.           Medications:  Current Outpatient Medications   Medication Sig Dispense Refill    acetaminophen (TYLENOL) 325 MG tablet Take 650 mg by mouth      cetirizine (ZYRTEC) 10 MG tablet Take 10 mg by mouth      clobetasol (TEMOVATE) 0.05 % external ointment Apply topically 2 times daily 60 g 3    clobetasol propionate (CLOBEX) 0.05 % external shampoo       folic acid (FOLVITE) 1 MG tablet Take 1 tablet (1 mg) by mouth daily , except on days that you take methotrexate 26 tablet 11    hydrOXYzine HCl (ATARAX) 25 MG tablet Take 1-2 tablets (25-50 mg) by mouth 3 times daily as needed for itching 60 tablet 2    methotrexate 2.5 MG tablet Take 10 tablets (25 mg) by mouth every 7 days . Take the same day of the week. 40 tablet 5     omeprazole (PRILOSEC) 10 MG DR capsule Take 20 mg by mouth daily      pseudoePHEDrine (SUDAFED) 30 MG tablet Take 30 mg by mouth      tazarotene (TAZORAC) 0.1 % external cream Apply to areas of thick scaling on hands and feet daily 60 g 3    triamcinolone (KENALOG) 0.1 % external cream APPLY TO AFFECTED AREA 2 TIMES A DAY FOR 10 DAYS TO ARMS, CHEST, NECK, AND BACK       No current facility-administered medications for this visit.      Past Medical History:   Patient Active Problem List   Diagnosis    Mycosis fungoides involving extranodal site excluding spleen and other solid organs (H)    Sezary disease involving lymph nodes of multiple regions (H)     No past medical history on file.    CC Referred Self, MD  No address on file on close of this encounter.      Again, thank you for allowing me to participate in the care of your patient.      Sincerely,    Ambrosio Pelletier MD

## 2024-09-25 ENCOUNTER — HOSPITAL ENCOUNTER (OUTPATIENT)
Dept: LAB | Facility: CLINIC | Age: 64
Discharge: HOME | End: 2024-09-25
Attending: STUDENT IN AN ORGANIZED HEALTH CARE EDUCATION/TRAINING PROGRAM | Admitting: STUDENT IN AN ORGANIZED HEALTH CARE EDUCATION/TRAINING PROGRAM
Payer: COMMERCIAL

## 2024-09-25 VITALS
SYSTOLIC BLOOD PRESSURE: 97 MMHG | HEART RATE: 55 BPM | DIASTOLIC BLOOD PRESSURE: 58 MMHG | TEMPERATURE: 97.7 F | RESPIRATION RATE: 18 BRPM

## 2024-09-25 PROCEDURE — 250N000009 HC RX 250

## 2024-09-25 PROCEDURE — 250N000011 HC RX IP 250 OP 636

## 2024-09-25 PROCEDURE — 36522 PHOTOPHERESIS: CPT

## 2024-09-25 RX ADMIN — HEPARIN SODIUM 10 ML: 1000 INJECTION INTRAVENOUS; SUBCUTANEOUS at 11:04

## 2024-09-25 RX ADMIN — ANTICOAGULANT CITRATE DEXTROSE SOLUTION FORMULA A: 12.25; 11; 3.65 SOLUTION INTRAVENOUS at 11:04

## 2024-09-25 NOTE — PROCEDURES
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  He is tolerating the procedure well.  He notes overall feeling well today.  Denies nausea, vomiting, fevers, chills, diarrhea. No significant changes to his skin at this point but he reports possible improvement.       Continue with plan per his clinical team.              History of Present Illness:   Blane Ugalde is a 63 year old male with an h/o hyperlipidemia. a prothrombin gene mutation.  cutaneous T-cell lymphoma (CTCL)/Sézary syndrome. To summarize his course, he had a skin rash involving the arms that waxed an waned since about 2011. It progressed to involve his torso. Rash was raised, red/purple in color, and intermittently itched and basically did not improve with topical creams.  2 skin biopsies in 2021 were non-diagnostic, but one performed 01/2024 was felt to be most consistent with MF.   ECP treatment was started in March 2024.      The plan is to perform 2 procedures per month on two consecutive days              Past Medical History:   No past medical history on file.  CTCL          Past Surgical History:     Past Surgical History:   Procedure Laterality Date    APPENDECTOMY      IR LYMPH NODE BIOPSY  7/2/2024             Allergies:   No Known Allergies          Medications:     Current Outpatient Medications   Medication Sig Dispense Refill    acetaminophen (TYLENOL) 325 MG tablet Take 650 mg by mouth      cetirizine (ZYRTEC) 10 MG tablet Take 10 mg by mouth      clobetasol (TEMOVATE) 0.05 % external ointment Apply topically 2 times daily. Apply thin layer to rash on the legs twice daily 60 g 4    clobetasol (TEMOVATE) 0.05 %  external ointment Apply topically 2 times daily 60 g 3    clobetasol propionate (CLOBEX) 0.05 % external shampoo       folic acid (FOLVITE) 1 MG tablet Take 1 tablet (1 mg) by mouth daily , except on days that you take methotrexate 26 tablet 11    hydrOXYzine HCl (ATARAX) 25 MG tablet Take 1-2 tablets (25-50 mg) by mouth 3 times daily as needed for itching 60 tablet 2    methotrexate 2.5 MG tablet Take 10 tablets (25 mg) by mouth every 7 days . Take the same day of the week. 40 tablet 5    naltrexone (DEPADE/REVIA) 50 MG tablet Take 0.5 tablets (25 mg) by mouth daily. 30 tablet 3    omeprazole (PRILOSEC) 10 MG DR capsule Take 20 mg by mouth daily      pseudoePHEDrine (SUDAFED) 30 MG tablet Take 30 mg by mouth      tazarotene (TAZORAC) 0.1 % external cream Apply to areas of thick scaling on hands and feet daily 60 g 3    triamcinolone (KENALOG) 0.1 % external cream APPLY TO AFFECTED AREA 2 TIMES A DAY FOR 10 DAYS TO ARMS, CHEST, NECK, AND BACK       Current Facility-Administered Medications   Medication Dose Route Frequency Provider Last Rate Last Admin    methoxsalen (photopheresis) SOLN   Apheresis DOES NOT GO TO Jessica Valerio MD                 Review of Systems:     See above           Exam:   BP 97/58   Pulse 55   Temp 97.7  F (36.5  C) (Oral)   Resp 18   Alert, no apparent distress  Breathing appears comfortable on room air  Peripheral IV access for the procedure.             Data:     No results found for any visits on 09/25/24.               Procedure Summary:   Extracorporeal photopheresis was performed on a [place] device. Peripheral IV was used for access. Heparinized saline was used to prime the circuit and ACD-A was used during the procedure for anticoagulation.  The patient's vital signs were stable throughout.  The patient tolerated the procedure well without complication.  See apheresis flowsheet for additional details.     ATTESTATION STATEMENT:  IAlexa MD, PhD., was available  by pager during the entire procedure.  I have reviewed the chart and discussed the patient and current procedure with the nursing staff.    Alexa Alford MD, PhD  Transfusion Medicine Attending  Medical Director, Blood Bank Laboratory  Pager [phone]

## 2024-10-02 LAB
PATH REPORT.COMMENTS IMP SPEC: ABNORMAL
PATH REPORT.COMMENTS IMP SPEC: YES
PATH REPORT.FINAL DX SPEC: ABNORMAL
PATH REPORT.GROSS SPEC: ABNORMAL
PATH REPORT.MICROSCOPIC SPEC OTHER STN: ABNORMAL
PATH REPORT.RELEVANT HX SPEC: ABNORMAL

## 2024-10-20 RX ORDER — CALCIUM CARBONATE 500 MG/1
500 TABLET, CHEWABLE ORAL
Status: CANCELLED | OUTPATIENT
Start: 2024-10-20

## 2024-10-21 ENCOUNTER — HOSPITAL ENCOUNTER (OUTPATIENT)
Dept: LAB | Facility: CLINIC | Age: 64
Discharge: HOME | End: 2024-10-21
Attending: STUDENT IN AN ORGANIZED HEALTH CARE EDUCATION/TRAINING PROGRAM | Admitting: STUDENT IN AN ORGANIZED HEALTH CARE EDUCATION/TRAINING PROGRAM
Payer: COMMERCIAL

## 2024-10-21 VITALS
HEART RATE: 55 BPM | SYSTOLIC BLOOD PRESSURE: 106 MMHG | TEMPERATURE: 97.8 F | DIASTOLIC BLOOD PRESSURE: 64 MMHG | RESPIRATION RATE: 18 BRPM

## 2024-10-21 DIAGNOSIS — Z79.899 ENCOUNTER FOR LONG-TERM (CURRENT) USE OF HIGH-RISK MEDICATION: ICD-10-CM

## 2024-10-21 LAB
ALBUMIN SERPL BCG-MCNC: 4.1 G/DL (ref 3.5–5.2)
ALP SERPL-CCNC: 83 U/L (ref 40–150)
ALT SERPL W P-5'-P-CCNC: 16 U/L (ref 0–70)
ANION GAP SERPL CALCULATED.3IONS-SCNC: 9 MMOL/L (ref 7–15)
AST SERPL W P-5'-P-CCNC: 24 U/L (ref 0–45)
BASOPHILS # BLD AUTO: 0 10E3/UL (ref 0–0.2)
BASOPHILS NFR BLD AUTO: 1 %
BILIRUB SERPL-MCNC: 0.8 MG/DL
BUN SERPL-MCNC: 9.1 MG/DL (ref 8–23)
CALCIUM SERPL-MCNC: 9.3 MG/DL (ref 8.8–10.4)
CHLORIDE SERPL-SCNC: 101 MMOL/L (ref 98–107)
CREAT SERPL-MCNC: 0.88 MG/DL (ref 0.67–1.17)
EGFRCR SERPLBLD CKD-EPI 2021: >90 ML/MIN/1.73M2
EOSINOPHIL # BLD AUTO: 0.1 10E3/UL (ref 0–0.7)
EOSINOPHIL NFR BLD AUTO: 1 %
ERYTHROCYTE [DISTWIDTH] IN BLOOD BY AUTOMATED COUNT: 13.1 % (ref 10–15)
GLUCOSE SERPL-MCNC: 101 MG/DL (ref 70–99)
HCO3 SERPL-SCNC: 27 MMOL/L (ref 22–29)
HCT VFR BLD AUTO: 43 % (ref 40–53)
HGB BLD-MCNC: 14.4 G/DL (ref 13.3–17.7)
IMM GRANULOCYTES # BLD: 0 10E3/UL
IMM GRANULOCYTES NFR BLD: 0 %
LYMPHOCYTES # BLD AUTO: 0.2 10E3/UL (ref 0.8–5.3)
LYMPHOCYTES NFR BLD AUTO: 4 %
MCH RBC QN AUTO: 28.6 PG (ref 26.5–33)
MCHC RBC AUTO-ENTMCNC: 33.5 G/DL (ref 31.5–36.5)
MCV RBC AUTO: 85 FL (ref 78–100)
MONOCYTES # BLD AUTO: 0.6 10E3/UL (ref 0–1.3)
MONOCYTES NFR BLD AUTO: 14 %
NEUTROPHILS # BLD AUTO: 3.2 10E3/UL (ref 1.6–8.3)
NEUTROPHILS NFR BLD AUTO: 79 %
NRBC # BLD AUTO: 0 10E3/UL
NRBC BLD AUTO-RTO: 0 /100
PLATELET # BLD AUTO: 222 10E3/UL (ref 150–450)
POTASSIUM SERPL-SCNC: 4.1 MMOL/L (ref 3.4–5.3)
PROT SERPL-MCNC: 6.9 G/DL (ref 6.4–8.3)
RBC # BLD AUTO: 5.04 10E6/UL (ref 4.4–5.9)
SODIUM SERPL-SCNC: 137 MMOL/L (ref 135–145)
WBC # BLD AUTO: 4.1 10E3/UL (ref 4–11)

## 2024-10-21 PROCEDURE — 82040 ASSAY OF SERUM ALBUMIN: CPT

## 2024-10-21 PROCEDURE — 85025 COMPLETE CBC W/AUTO DIFF WBC: CPT

## 2024-10-21 PROCEDURE — 36522 PHOTOPHERESIS: CPT

## 2024-10-21 PROCEDURE — 250N000009 HC RX 250: Performed by: PATHOLOGY

## 2024-10-21 PROCEDURE — 36415 COLL VENOUS BLD VENIPUNCTURE: CPT

## 2024-10-21 RX ADMIN — ANTICOAGULANT CITRATE DEXTROSE SOLUTION FORMULA A 214 ML: 12.25; 11; 3.65 SOLUTION INTRAVENOUS at 12:45

## 2024-10-21 NOTE — PROCEDURES
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  He is tolerated the procedure well and his next one is scheduled for tomorrow            History of Present Illness:   Blane Ugalde is a 63 year old male with an h/o hyperlipidemia. a prothrombin gene mutation.  cutaneous T-cell lymphoma (CTCL)/Sézary syndrome. To summarize his course, he had a skin rash involving the arms that waxed an waned since about 2011. It progressed to involve his torso. Rash was raised, red/purple in color, and intermittently itched and basically did not improve with topical creams.  2 skin biopsies in 2021 were non-diagnostic, but one performed 01/2024 was felt to be most consistent with MF.   ECP treatment was started in March 2024.      He currently undergoes 2 procedures per month on two consecutive days.             Past Medical History:     CTCL          Past Surgical History:     Past Surgical History:   Procedure Laterality Date    APPENDECTOMY      IR LYMPH NODE BIOPSY  7/2/2024             Allergies:   No Known Allergies          Medications:     Current Outpatient Medications   Medication Sig Dispense Refill    acetaminophen (TYLENOL) 325 MG tablet Take 650 mg by mouth      cetirizine (ZYRTEC) 10 MG tablet Take 10 mg by mouth      clobetasol (TEMOVATE) 0.05 % external ointment Apply topically 2 times daily. Apply thin layer to rash on the legs twice daily 60 g 4    clobetasol (TEMOVATE) 0.05 % external ointment Apply topically 2 times daily 60 g 3    clobetasol propionate (CLOBEX) 0.05 % external shampoo       folic acid (FOLVITE) 1 MG tablet Take 1 tablet (1 mg) by mouth daily  , except on days that you take methotrexate 26 tablet 11    hydrOXYzine HCl (ATARAX) 25 MG tablet Take 1-2 tablets (25-50 mg) by mouth 3 times daily as needed for itching 60 tablet 2    methotrexate 2.5 MG tablet Take 10 tablets (25 mg) by mouth every 7 days . Take the same day of the week. 40 tablet 5    naltrexone (DEPADE/REVIA) 50 MG tablet Take 0.5 tablets (25 mg) by mouth daily. 30 tablet 3    omeprazole (PRILOSEC) 10 MG DR capsule Take 20 mg by mouth daily      pseudoePHEDrine (SUDAFED) 30 MG tablet Take 30 mg by mouth      tazarotene (TAZORAC) 0.1 % external cream Apply to areas of thick scaling on hands and feet daily 60 g 3    triamcinolone (KENALOG) 0.1 % external cream APPLY TO AFFECTED AREA 2 TIMES A DAY FOR 10 DAYS TO ARMS, CHEST, NECK, AND BACK       Current Facility-Administered Medications   Medication Dose Route Frequency Provider Last Rate Last Admin    methoxsalen (photopheresis) SOLN   Apheresis DOES NOT GO TO Wilner Richardson MD                Exam:   /65   Pulse 58   Temp 98  F (36.7  C) (Oral)   Resp 18   Patient sleeping comfortably in no apparent distress  Breathing appears comfortable on room air  Peripheral IV access  was used for the procedure.             Data:     Results for orders placed or performed during the hospital encounter of 10/21/24   Comprehensive metabolic panel     Status: Abnormal   Result Value Ref Range    Sodium 137 135 - 145 mmol/L    Potassium 4.1 3.4 - 5.3 mmol/L    Carbon Dioxide (CO2) 27 22 - 29 mmol/L    Anion Gap 9 7 - 15 mmol/L    Urea Nitrogen 9.1 8.0 - 23.0 mg/dL    Creatinine 0.88 0.67 - 1.17 mg/dL    GFR Estimate >90 >60 mL/min/1.73m2    Calcium 9.3 8.8 - 10.4 mg/dL    Chloride 101 98 - 107 mmol/L    Glucose 101 (H) 70 - 99 mg/dL    Alkaline Phosphatase 83 40 - 150 U/L    AST 24 0 - 45 U/L    ALT 16 0 - 70 U/L    Protein Total 6.9 6.4 - 8.3 g/dL    Albumin 4.1 3.5 - 5.2 g/dL    Bilirubin Total 0.8 <=1.2 mg/dL   CBC with platelets and differential      Status: Abnormal   Result Value Ref Range    WBC Count 4.1 4.0 - 11.0 10e3/uL    RBC Count 5.04 4.40 - 5.90 10e6/uL    Hemoglobin 14.4 13.3 - 17.7 g/dL    Hematocrit 43.0 40.0 - 53.0 %    MCV 85 78 - 100 fL    MCH 28.6 26.5 - 33.0 pg    MCHC 33.5 31.5 - 36.5 g/dL    RDW 13.1 10.0 - 15.0 %    Platelet Count 222 150 - 450 10e3/uL    % Neutrophils 79 %    % Lymphocytes 4 %    % Monocytes 14 %    % Eosinophils 1 %    % Basophils 1 %    % Immature Granulocytes 0 %    NRBCs per 100 WBC 0 <1 /100    Absolute Neutrophils 3.2 1.6 - 8.3 10e3/uL    Absolute Lymphocytes 0.2 (L) 0.8 - 5.3 10e3/uL    Absolute Monocytes 0.6 0.0 - 1.3 10e3/uL    Absolute Eosinophils 0.1 0.0 - 0.7 10e3/uL    Absolute Basophils 0.0 0.0 - 0.2 10e3/uL    Absolute Immature Granulocytes 0.0 <=0.4 10e3/uL    Absolute NRBCs 0.0 10e3/uL   CBC with platelets differential     Status: Abnormal    Narrative    The following orders were created for panel order CBC with platelets differential.  Procedure                               Abnormality         Status                     ---------                               -----------         ------                     CBC with platelets and d...[[id number]]  Abnormal            Final result                 Please view results for these tests on the individual orders.            Procedure Summary:   Extracorporeal photopheresis was performed on a [employer] device. Peripheral IV was used for access. Heparinized saline was used to prime the circuit and ACD-A was used during the procedure for anticoagulation.  The patient's vital signs were stable throughout.  The patient tolerated the procedure well without complication.  See apheresis flowsheet for additional details.            Procedure Summary:    A photopheresis was performed and peripheral veins were used for access. A Heparinized Saline prime was used but  Citrate  was the anticoagulant during the procedure.   The patient's vital signs are currently stable and  he is tolerating the procedure well.     Attestation:   This patient has been seen and evaluated by me, Wilner Cifuentes.   Wilner Cifuentes MD   Division of Transfusion Medicine   Department [place] [address]   Pager: [phone]

## 2024-10-22 ENCOUNTER — HOSPITAL ENCOUNTER (OUTPATIENT)
Dept: LAB | Facility: CLINIC | Age: 64
Discharge: HOME | End: 2024-10-22
Attending: STUDENT IN AN ORGANIZED HEALTH CARE EDUCATION/TRAINING PROGRAM | Admitting: STUDENT IN AN ORGANIZED HEALTH CARE EDUCATION/TRAINING PROGRAM
Payer: COMMERCIAL

## 2024-10-22 VITALS
TEMPERATURE: 98.1 F | DIASTOLIC BLOOD PRESSURE: 61 MMHG | SYSTOLIC BLOOD PRESSURE: 99 MMHG | RESPIRATION RATE: 16 BRPM | HEART RATE: 57 BPM

## 2024-10-22 PROCEDURE — 36522 PHOTOPHERESIS: CPT

## 2024-10-22 PROCEDURE — 250N000009 HC RX 250: Performed by: STUDENT IN AN ORGANIZED HEALTH CARE EDUCATION/TRAINING PROGRAM

## 2024-10-22 RX ADMIN — ANTICOAGULANT CITRATE DEXTROSE SOLUTION FORMULA A 211 ML: 12.25; 11; 3.65 SOLUTION INTRAVENOUS at 08:26

## 2024-10-22 RX ADMIN — Medication: at 08:26

## 2024-10-22 NOTE — PROCEDURES
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  He  tolerated the procedure well and his next one is scheduled   for ~ 1 month from now           History of Present Illness:   Blane Ugalde is a 63 year old male with an h/o hyperlipidemia. a prothrombin gene mutation.  cutaneous T-cell lymphoma (CTCL)/Sézary syndrome. To summarize his course, he had a skin rash involving the arms that waxed an waned since about 2011. It progressed to involve his torso. Rash was raised, red/purple in color, and intermittently itched and basically did not improve with topical creams.  2 skin biopsies in 2021 were non-diagnostic, but one performed 01/2024 was felt to be most consistent with MF.   ECP treatment was started in March 2024.      He currently undergoes 2 procedures per month on two consecutive days.             Past Medical History:     CTCL          Past Surgical History:     Past Surgical History:   Procedure Laterality Date    APPENDECTOMY      IR LYMPH NODE BIOPSY  7/2/2024             Allergies:   No Known Allergies          Medications:     Current Outpatient Medications   Medication Sig Dispense Refill    cetirizine (ZYRTEC) 10 MG tablet Take 10 mg by mouth      clobetasol (TEMOVATE) 0.05 % external ointment Apply topically 2 times daily 60 g 3    folic acid (FOLVITE) 1 MG tablet Take 1 tablet (1 mg) by mouth daily , except on days that you take methotrexate 26 tablet 11    methotrexate 2.5 MG tablet Take 10 tablets (25 mg) by mouth every 7 days . Take the same day of the week. 40 tablet 5    naltrexone (DEPADE/REVIA) 50 MG tablet Take 0.5 tablets (25 mg) by  mouth daily. 30 tablet 3    omeprazole (PRILOSEC) 10 MG DR capsule Take 20 mg by mouth daily      acetaminophen (TYLENOL) 325 MG tablet Take 650 mg by mouth      clobetasol (TEMOVATE) 0.05 % external ointment Apply topically 2 times daily. Apply thin layer to rash on the legs twice daily 60 g 4    clobetasol propionate (CLOBEX) 0.05 % external shampoo       hydrOXYzine HCl (ATARAX) 25 MG tablet Take 1-2 tablets (25-50 mg) by mouth 3 times daily as needed for itching 60 tablet 2    pseudoePHEDrine (SUDAFED) 30 MG tablet Take 30 mg by mouth      tazarotene (TAZORAC) 0.1 % external cream Apply to areas of thick scaling on hands and feet daily 60 g 3    triamcinolone (KENALOG) 0.1 % external cream APPLY TO AFFECTED AREA 2 TIMES A DAY FOR 10 DAYS TO ARMS, CHEST, NECK, AND BACK       Current Facility-Administered Medications   Medication Dose Route Frequency Provider Last Rate Last Admin    methoxsalen (photopheresis) SOLN   Apheresis DOES NOT GO TO Abraham Rojas MD                Exam:   BP 99/61   Pulse 57   Temp 98.1  F (36.7  C) (Oral)   Resp 16   Patient sleeping comfortably in no apparent distress  Breathing appears comfortable on room air  Peripheral IV access  was used for the procedure.             Data:     No results found for any visits on 10/22/24.           Procedure Summary:   Extracorporeal photopheresis was performed on a [employer] device. Peripheral IV was used for access. Heparinized saline was used to prime the circuit and ACD-A was used during the procedure for anticoagulation.  The patient's vital signs were stable throughout.  The patient tolerated the procedure well without complication.  See apheresis flowsheet for additional details.            Procedure Summary:    A photopheresis was performed and peripheral veins were used for access. Citrate  was the anticoagulant during the procedure.   The patient's vital signs are currently stable and he is tolerating the procedure well.      Attestation:   This patient has been seen and evaluated by me, Wilner Cifuentes.   Wilner Cifuentes MD   Division of Transfusion Medicine   Department [place] [address]   Pager: [phone]

## 2024-11-03 PROBLEM — C84.04: Status: ACTIVE | Noted: 2024-11-03

## 2024-11-18 ENCOUNTER — HOSPITAL ENCOUNTER (OUTPATIENT)
Dept: LAB | Facility: CLINIC | Age: 64
Discharge: HOME | End: 2024-11-18
Attending: STUDENT IN AN ORGANIZED HEALTH CARE EDUCATION/TRAINING PROGRAM | Admitting: STUDENT IN AN ORGANIZED HEALTH CARE EDUCATION/TRAINING PROGRAM
Payer: COMMERCIAL

## 2024-11-18 VITALS
HEART RATE: 56 BPM | BODY MASS INDEX: 26.62 KG/M2 | DIASTOLIC BLOOD PRESSURE: 66 MMHG | TEMPERATURE: 98.2 F | WEIGHT: 164.9 LBS | SYSTOLIC BLOOD PRESSURE: 104 MMHG | RESPIRATION RATE: 20 BRPM

## 2024-11-18 DIAGNOSIS — C84.18 SEZARY DISEASE INVOLVING LYMPH NODES OF MULTIPLE REGIONS (H): ICD-10-CM

## 2024-11-18 DIAGNOSIS — C84.09 MYCOSIS FUNGOIDES INVOLVING EXTRANODAL SITE EXCLUDING SPLEEN AND OTHER SOLID ORGANS (H): ICD-10-CM

## 2024-11-18 LAB
ALBUMIN SERPL BCG-MCNC: 3.9 G/DL (ref 3.5–5.2)
ALP SERPL-CCNC: 79 U/L (ref 40–150)
ALT SERPL W P-5'-P-CCNC: 16 U/L (ref 0–70)
ANION GAP SERPL CALCULATED.3IONS-SCNC: 9 MMOL/L (ref 7–15)
AST SERPL W P-5'-P-CCNC: 21 U/L (ref 0–45)
BASOPHILS # BLD AUTO: 0.1 10E3/UL (ref 0–0.2)
BASOPHILS NFR BLD AUTO: 1 %
BILIRUB SERPL-MCNC: 0.8 MG/DL
BUN SERPL-MCNC: 11.2 MG/DL (ref 8–23)
CALCIUM SERPL-MCNC: 9.2 MG/DL (ref 8.8–10.4)
CHLORIDE SERPL-SCNC: 101 MMOL/L (ref 98–107)
CREAT SERPL-MCNC: 0.83 MG/DL (ref 0.67–1.17)
EGFRCR SERPLBLD CKD-EPI 2021: >90 ML/MIN/1.73M2
EOSINOPHIL # BLD AUTO: 0.2 10E3/UL (ref 0–0.7)
EOSINOPHIL NFR BLD AUTO: 4 %
ERYTHROCYTE [DISTWIDTH] IN BLOOD BY AUTOMATED COUNT: 13.5 % (ref 10–15)
GLUCOSE SERPL-MCNC: 82 MG/DL (ref 70–99)
HCO3 SERPL-SCNC: 27 MMOL/L (ref 22–29)
HCT VFR BLD AUTO: 40.6 % (ref 40–53)
HGB BLD-MCNC: 13.4 G/DL (ref 13.3–17.7)
IMM GRANULOCYTES # BLD: 0 10E3/UL
IMM GRANULOCYTES NFR BLD: 0 %
LDH SERPL L TO P-CCNC: 177 U/L (ref 0–250)
LYMPHOCYTES # BLD AUTO: 0.2 10E3/UL (ref 0.8–5.3)
LYMPHOCYTES NFR BLD AUTO: 4 %
MCH RBC QN AUTO: 27.1 PG (ref 26.5–33)
MCHC RBC AUTO-ENTMCNC: 33 G/DL (ref 31.5–36.5)
MCV RBC AUTO: 82 FL (ref 78–100)
MONOCYTES # BLD AUTO: 0.7 10E3/UL (ref 0–1.3)
MONOCYTES NFR BLD AUTO: 13 %
NEUTROPHILS # BLD AUTO: 4 10E3/UL (ref 1.6–8.3)
NEUTROPHILS NFR BLD AUTO: 77 %
NRBC # BLD AUTO: 0 10E3/UL
NRBC BLD AUTO-RTO: 0 /100
PLATELET # BLD AUTO: 214 10E3/UL (ref 150–450)
POTASSIUM SERPL-SCNC: 4.2 MMOL/L (ref 3.4–5.3)
PROT SERPL-MCNC: 6.6 G/DL (ref 6.4–8.3)
RBC # BLD AUTO: 4.94 10E6/UL (ref 4.4–5.9)
SODIUM SERPL-SCNC: 137 MMOL/L (ref 135–145)
WBC # BLD AUTO: 5.2 10E3/UL (ref 4–11)

## 2024-11-18 PROCEDURE — 250N000009 HC RX 250

## 2024-11-18 PROCEDURE — 82947 ASSAY GLUCOSE BLOOD QUANT: CPT

## 2024-11-18 PROCEDURE — 85025 COMPLETE CBC W/AUTO DIFF WBC: CPT

## 2024-11-18 PROCEDURE — 82040 ASSAY OF SERUM ALBUMIN: CPT

## 2024-11-18 PROCEDURE — 36415 COLL VENOUS BLD VENIPUNCTURE: CPT

## 2024-11-18 PROCEDURE — 82310 ASSAY OF CALCIUM: CPT

## 2024-11-18 PROCEDURE — 85014 HEMATOCRIT: CPT

## 2024-11-18 PROCEDURE — 83615 LACTATE (LD) (LDH) ENZYME: CPT

## 2024-11-18 PROCEDURE — 36522 PHOTOPHERESIS: CPT

## 2024-11-18 RX ADMIN — ANTICOAGULANT CITRATE DEXTROSE SOLUTION FORMULA A 218 ML: 12.25; 11; 3.65 SOLUTION INTRAVENOUS at 13:27

## 2024-11-18 NOTE — PROCEDURES
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  He is tolerated the procedure well and his next one is scheduled for tomorrow            History of Present Illness:   Blane Ugalde is a 63 year old male with an h/o hyperlipidemia. a prothrombin gene mutation. and  cutaneous T-cell lymphoma (CTCL)/Sézary syndrome. To summarize his course, he had a skin rash involving the arms that waxed and waned since about 2011. It progressed to involve his torso. Rash was raised, red/purple in color, and intermittently itched but basically did not improve with topical creams.  2 skin biopsies in 2021 were non-diagnostic, but one performed 01/2024 was felt to be most consistent with MF.   ECP treatment was started in March 2024.      He currently undergoes 2 procedures per month on two consecutive days.             Past Medical History:     CTCL          Past Surgical History:     Past Surgical History:   Procedure Laterality Date    APPENDECTOMY      IR LYMPH NODE BIOPSY  7/2/2024             Allergies:   No Known Allergies          Medications:     Current Outpatient Medications   Medication Sig Dispense Refill    acetaminophen (TYLENOL) 325 MG tablet Take 650 mg by mouth      cetirizine (ZYRTEC) 10 MG tablet Take 10 mg by mouth      clobetasol (TEMOVATE) 0.05 % external ointment Apply topically 2 times daily. Apply thin layer to rash on the legs twice daily 60 g 4    clobetasol (TEMOVATE) 0.05 % external ointment Apply topically 2 times daily 60 g 3    clobetasol propionate (CLOBEX) 0.05 % external shampoo       folic acid (FOLVITE) 1 MG tablet Take 1 tablet (1 mg) by mouth  daily , except on days that you take methotrexate 26 tablet 11    hydrOXYzine HCl (ATARAX) 25 MG tablet Take 1-2 tablets (25-50 mg) by mouth 3 times daily as needed for itching 60 tablet 2    methotrexate 2.5 MG tablet Take 10 tablets (25 mg) by mouth every 7 days . Take the same day of the week. 40 tablet 5    naltrexone (DEPADE/REVIA) 50 MG tablet Take 0.5 tablets (25 mg) by mouth daily. 30 tablet 3    omeprazole (PRILOSEC) 10 MG DR capsule Take 20 mg by mouth daily      pseudoePHEDrine (SUDAFED) 30 MG tablet Take 30 mg by mouth      tazarotene (TAZORAC) 0.1 % external cream Apply to areas of thick scaling on hands and feet daily 60 g 3    triamcinolone (KENALOG) 0.1 % external cream APPLY TO AFFECTED AREA 2 TIMES A DAY FOR 10 DAYS TO ARMS, CHEST, NECK, AND BACK       Current Facility-Administered Medications   Medication Dose Route Frequency Provider Last Rate Last Admin    methoxsalen (photopheresis) SOLN   Apheresis DOES NOT GO TO Ian Choe MD                Exam:   /72   Pulse 56   Temp 97.6  F (36.4  C) (Oral)   Resp 18   Wt 74.8 kg (164 lb 14.5 oz)   BMI 26.62 kg/m    Patient sleeping comfortably in no apparent distress  Breathing appears comfortable on room air  Peripheral IV access  was used for the procedure.             Data:     Results for orders placed or performed during the hospital encounter of 11/18/24   Comprehensive metabolic panel     Status: Normal   Result Value Ref Range    Sodium 137 135 - 145 mmol/L    Potassium 4.2 3.4 - 5.3 mmol/L    Carbon Dioxide (CO2) 27 22 - 29 mmol/L    Anion Gap 9 7 - 15 mmol/L    Urea Nitrogen 11.2 8.0 - 23.0 mg/dL    Creatinine 0.83 0.67 - 1.17 mg/dL    GFR Estimate >90 >60 mL/min/1.73m2    Calcium 9.2 8.8 - 10.4 mg/dL    Chloride 101 98 - 107 mmol/L    Glucose 82 70 - 99 mg/dL    Alkaline Phosphatase 79 40 - 150 U/L    AST 21 0 - 45 U/L    ALT 16 0 - 70 U/L    Protein Total 6.6 6.4 - 8.3 g/dL    Albumin 3.9 3.5 - 5.2 g/dL    Bilirubin Total  0.8 <=1.2 mg/dL   Lactate Dehydrogenase     Status: Normal   Result Value Ref Range    Lactate Dehydrogenase 177 0 - 250 U/L   CBC with platelets and differential     Status: Abnormal   Result Value Ref Range    WBC Count 5.2 4.0 - 11.0 10e3/uL    RBC Count 4.94 4.40 - 5.90 10e6/uL    Hemoglobin 13.4 13.3 - 17.7 g/dL    Hematocrit 40.6 40.0 - 53.0 %    MCV 82 78 - 100 fL    MCH 27.1 26.5 - 33.0 pg    MCHC 33.0 31.5 - 36.5 g/dL    RDW 13.5 10.0 - 15.0 %    Platelet Count 214 150 - 450 10e3/uL    % Neutrophils 77 %    % Lymphocytes 4 %    % Monocytes 13 %    % Eosinophils 4 %    % Basophils 1 %    % Immature Granulocytes 0 %    NRBCs per 100 WBC 0 <1 /100    Absolute Neutrophils 4.0 1.6 - 8.3 10e3/uL    Absolute Lymphocytes 0.2 (L) 0.8 - 5.3 10e3/uL    Absolute Monocytes 0.7 0.0 - 1.3 10e3/uL    Absolute Eosinophils 0.2 0.0 - 0.7 10e3/uL    Absolute Basophils 0.1 0.0 - 0.2 10e3/uL    Absolute Immature Granulocytes 0.0 <=0.4 10e3/uL    Absolute NRBCs 0.0 10e3/uL   CBC with Platelets & Differential     Status: Abnormal    Narrative    The following orders were created for panel order CBC with Platelets & Differential.  Procedure                               Abnormality         Status                     ---------                               -----------         ------                     CBC with platelets and d...[[id number]]  Abnormal            Final result                 Please view results for these tests on the individual orders.              Procedure Summary:    A photopheresis was performed and peripheral veins were used for access. Citrate  was the anticoagulant during the procedure.   The patient's vital signs are currently stable and he is tolerating the procedure well.     Attestation:   During the procedure the patient was directly seen and evaluated by me, Wilner Cifuentes MD.  I have reviewed the chart and pertinent laboratory findings, and discussed the patient and the current procedure with the  Apheresis nursing staff.   Wilner Cifuentes MD  Transfusion Medicine Attending  Division of Transfusion Medicine   Department of Laboratory Medicine & [place] [address]   Pager: [phone]

## 2024-11-18 NOTE — DISCHARGE INSTRUCTIONS
Apheresis Blood Donor Center Post Instructions  You may feel tired after your procedure today.   Please call your doctor if you have:  bleeding that doesn t stop, fever, pain where a needle or tube (catheter) was placed, seizures, trouble breathing, red urine, nausea or vomiting, other health concerns.     If your symptoms are severe, call 911.  If your veins were used, keep the bandages on for 2-4 hours.  Avoid heavy lifting with your arms.  If bleeding occurs from these sites, apply firm pressure for 5-10 minutes.  Call your physician if bleeding continues.      The Apheresis/Blood Donor Center is open Monday-Friday 7:30 a.m. to 5 p.m.  The phone number is [phone].  A Transfusion Medicine physician can be reached after 5:00 p.m. weekdays and on weekends /Holidays by calling [phone], and asking for the physician on call.      Photopheresis:  Avoid sunlight , and wear UVA-protective, full coverage sunglasses and sunscreen SPF 15 or higher  for 24 hours after your treatment.  The drug used in your treatment makes patients more sensitive to sunlight for about 24 hours after the treatment.

## 2024-11-19 ENCOUNTER — HOSPITAL ENCOUNTER (OUTPATIENT)
Dept: LAB | Facility: CLINIC | Age: 64
Discharge: HOME | End: 2024-11-19
Attending: STUDENT IN AN ORGANIZED HEALTH CARE EDUCATION/TRAINING PROGRAM | Admitting: STUDENT IN AN ORGANIZED HEALTH CARE EDUCATION/TRAINING PROGRAM
Payer: COMMERCIAL

## 2024-11-19 VITALS
DIASTOLIC BLOOD PRESSURE: 67 MMHG | SYSTOLIC BLOOD PRESSURE: 97 MMHG | TEMPERATURE: 98 F | HEART RATE: 62 BPM | BODY MASS INDEX: 26.62 KG/M2 | RESPIRATION RATE: 18 BRPM | WEIGHT: 164.9 LBS

## 2024-11-19 PROCEDURE — 36522 PHOTOPHERESIS: CPT

## 2024-11-19 PROCEDURE — 250N000009 HC RX 250

## 2024-11-19 RX ADMIN — ANTICOAGULANT CITRATE DEXTROSE SOLUTION FORMULA A 192 ML: 12.25; 11; 3.65 SOLUTION INTRAVENOUS at 10:54

## 2024-11-19 NOTE — PROCEDURES
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  He  tolerated the procedure well and his next one is tentatively scheduled  for ~ 12/17/2024          History of Present Illness:   Blane Ugalde is a 63 year old male with an h/o hyperlipidemia. a prothrombin gene mutation. and  cutaneous T-cell lymphoma (CTCL)/Sézary syndrome. To summarize his course, he had a skin rash involving the arms that waxed and waned since about 2011. It progressed to involve his torso. Rash was raised, red/purple in color, and intermittently itched but basically did not improve with topical creams.  2 skin biopsies in 2021 were non-diagnostic, but one performed 01/2024 was felt to be most consistent with MF.   ECP treatment was started in March 2024.      He currently undergoes 2 procedures per month on two consecutive days.             Past Medical History:     CTCL          Past Surgical History:     Past Surgical History:   Procedure Laterality Date    APPENDECTOMY      IR LYMPH NODE BIOPSY  7/2/2024             Allergies:   No Known Allergies          Medications:     Current Outpatient Medications   Medication Sig Dispense Refill    acetaminophen (TYLENOL) 325 MG tablet Take 650 mg by mouth      cetirizine (ZYRTEC) 10 MG tablet Take 10 mg by mouth      clobetasol (TEMOVATE) 0.05 % external ointment Apply topically 2 times daily. Apply thin layer to rash on the legs twice daily 60 g 4    clobetasol (TEMOVATE) 0.05 % external ointment Apply topically 2 times daily 60 g 3    clobetasol propionate (CLOBEX) 0.05 % external shampoo       folic acid (FOLVITE) 1 MG tablet Take 1 tablet (1  mg) by mouth daily , except on days that you take methotrexate 26 tablet 11    hydrOXYzine HCl (ATARAX) 25 MG tablet Take 1-2 tablets (25-50 mg) by mouth 3 times daily as needed for itching 60 tablet 2    methotrexate 2.5 MG tablet Take 10 tablets (25 mg) by mouth every 7 days . Take the same day of the week. 40 tablet 5    naltrexone (DEPADE/REVIA) 50 MG tablet Take 0.5 tablets (25 mg) by mouth daily. 30 tablet 3    omeprazole (PRILOSEC) 10 MG DR capsule Take 20 mg by mouth daily      pseudoePHEDrine (SUDAFED) 30 MG tablet Take 30 mg by mouth      tazarotene (TAZORAC) 0.1 % external cream Apply to areas of thick scaling on hands and feet daily 60 g 3    triamcinolone (KENALOG) 0.1 % external cream APPLY TO AFFECTED AREA 2 TIMES A DAY FOR 10 DAYS TO ARMS, CHEST, NECK, AND BACK       Current Facility-Administered Medications   Medication Dose Route Frequency Provider Last Rate Last Admin    methoxsalen (photopheresis) SOLN   Apheresis DOES NOT GO TO Ian Choe MD                Exam:   BP 97/67   Pulse 62   Temp 98  F (36.7  C) (Oral)   Resp 18   Wt 74.8 kg (164 lb 14.5 oz)   BMI 26.62 kg/m                Data:     No results found for any visits on 11/19/24.             Procedure Summary:    A photopheresis was performed and peripheral veins were used for access. Citrate  was the anticoagulant during the procedure.   The patient's vital signs are currently stable and he is tolerating the procedure well.     Attestation:   I Wilner Cifuentes MD was available by pager during the entire procedure. I have reviewed the chart and discussed the patient and current procedure with the apheresis nursing staff.    Wilner Cifuentes MD   Division of Transfusion Medicine   Department [place] [address]   Pager: [phone]

## 2024-11-20 ENCOUNTER — ONCOLOGY VISIT (OUTPATIENT)
Dept: ONCOLOGY | Facility: CLINIC | Age: 64
End: 2024-11-20
Attending: INTERNAL MEDICINE
Payer: COMMERCIAL

## 2024-11-20 VITALS
WEIGHT: 165.7 LBS | DIASTOLIC BLOOD PRESSURE: 77 MMHG | RESPIRATION RATE: 20 BRPM | HEART RATE: 63 BPM | BODY MASS INDEX: 26.74 KG/M2 | TEMPERATURE: 98.4 F | SYSTOLIC BLOOD PRESSURE: 118 MMHG | OXYGEN SATURATION: 98 %

## 2024-11-20 DIAGNOSIS — C84.09 MYCOSIS FUNGOIDES INVOLVING EXTRANODAL SITE EXCLUDING SPLEEN AND OTHER SOLID ORGANS (H): Primary | ICD-10-CM

## 2024-11-20 DIAGNOSIS — C84.04 MYCOSIS FUNGOIDES INVOLVING LYMPH NODES OF AXILLA (H): ICD-10-CM

## 2024-11-20 DIAGNOSIS — C84.18 SEZARY DISEASE INVOLVING LYMPH NODES OF MULTIPLE REGIONS (H): ICD-10-CM

## 2024-11-20 PROCEDURE — G0463 HOSPITAL OUTPT CLINIC VISIT: HCPCS | Performed by: INTERNAL MEDICINE

## 2024-11-20 RX ORDER — PROCHLORPERAZINE MALEATE 10 MG
10 TABLET ORAL
COMMUNITY
Start: 2024-08-29 | End: 2025-08-29

## 2024-11-20 ASSESSMENT — PAIN SCALES - GENERAL: PAINLEVEL_OUTOF10: NO PAIN (0)

## 2024-11-20 NOTE — LETTER
11/20/2024      Blane Ugalde  [address] [place] [id number]      Dear Colleague,    Thank you for referring your patient, Blane Ugalde, to the [facility]. Please see a copy of my visit note below.    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  To summarize his course, he had a skin rash involving the arms that waxed an waned since about 2011.  It progressed to involve his torso.  Rash was raised, red/purple in color, and intermittently itched.  There was limited benefit from topical creams.  Skin biopsy in early 2021 suggested interface dermatitis.  Skin biopsy in 07/2021 had atypical lymphoid infiltrates but with no specific histologic diagnosis.  Skin biopsy in 01/2024 was felt to be most consistent with MF.  Bone marrow biopsy in 02/2024 had no obvious bone marrow involvement but peripheral blood flow cytometry showed about 0.5 x 10^9/L clonal T cells (Sezary cells).  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  He had received NBUVB treatment in the past with some improvement in itching and some relief from topical steroids but not durable.  Connected with our Derm Lymphoma Team in 02/2024 and started ECP and weekly low-dose methotrexate.  He had a CT scan on 4/26/2024 that showed prominent axillary lymphadenopathy on the right greater than left and slightly enlarged bilateral inguinal lymphadenopathy, and core needle biopsy of a left axillary lymph node in 07/2024 was consistent with involvement by MF with minimal CD30 expression and no evidence of large cell transformation.  He had a visit at [facility] for another opinion in 07/2024, workup including peripheral  blood flow cytometry included about 1.1 x 10^9/L clonal T cells - overall consistent with clinical stage IVA2 SS, and they recommended treatment with mogamulizumab.  Visit for ongoing management of MF vs SS.    He is with his wife Irma, not with his son Sterling today.  Started on treatment with mogamulizumab in early October, continued on photopheresis for two days a month. Rashes has been waxed and waned, not as itchy as he uses steroid cream about once a week now. Some new rashes on legs after treatment initiated. Skin peeling in feet is slightly better. Energy level has significantly improved since the start of the treatment. Palpable lumps are smaller as well.    PAST MEDICAL HISTORY:  Prothrombin gene mutation, hyperlipidemia    MEDICATIONS:  Current Outpatient Medications   Medication Sig Dispense Refill     prochlorperazine (COMPAZINE) 10 MG tablet Take 10 mg by mouth.       acetaminophen (TYLENOL) 325 MG tablet Take 650 mg by mouth       cetirizine (ZYRTEC) 10 MG tablet Take 10 mg by mouth       clobetasol (TEMOVATE) 0.05 % external ointment Apply topically 2 times daily. Apply thin layer to rash on the legs twice daily 60 g 4     clobetasol (TEMOVATE) 0.05 % external ointment Apply topically 2 times daily 60 g 3     clobetasol propionate (CLOBEX) 0.05 % external shampoo        folic acid (FOLVITE) 1 MG tablet Take 1 tablet (1 mg) by mouth daily , except on days that you take methotrexate 26 tablet 11     hydrOXYzine HCl (ATARAX) 25 MG tablet Take 1-2 tablets (25-50 mg) by mouth 3 times daily as needed for itching 60 tablet 2     methotrexate 2.5 MG tablet Take 10 tablets (25 mg) by mouth every 7 days . Take the same day of the week. 40 tablet 5     naltrexone (DEPADE/REVIA) 50 MG tablet Take 0.5 tablets (25 mg) by mouth daily. 30 tablet 3     omeprazole (PRILOSEC) 10 MG DR capsule Take 20 mg by mouth daily       pseudoePHEDrine (SUDAFED) 30 MG tablet Take 30 mg by mouth       tazarotene (TAZORAC) 0.1 %  external cream Apply to areas of thick scaling on hands and feet daily 60 g 3     triamcinolone (KENALOG) 0.1 % external cream APPLY TO AFFECTED AREA 2 TIMES A DAY FOR 10 DAYS TO ARMS, CHEST, NECK, AND BACK       No current facility-administered medications for this visit.     SOCIAL HISTORY:  never smoker, , 3 kids, contractor, lives in [place], [place]    PHYSICAL EXAMINATION:  /77 (BP Location: Right arm, Patient Position: Sitting, Cuff Size: Adult Regular)   Pulse 63   Temp 98.4  F (36.9  C) (Oral)   Resp 20   Wt 75.2 kg (165 lb 11.2 oz)   SpO2 98%   BMI 26.74 kg/m    Wt Readings from Last 5 Encounters:   11/20/24 75.2 kg (165 lb 11.2 oz)   11/19/24 74.8 kg (164 lb 14.5 oz)   11/18/24 74.8 kg (164 lb 14.5 oz)   08/07/24 77.1 kg (170 lb)   07/29/24 76.7 kg (169 lb 1.5 oz)     General: Well appearing.  HEENT: Sclerae anicteric.  Lungs: Breathing comfortably. No cough.  MSK: Grossly normal movement.  Neuro: Grossly non-focal.  Psych: Alert and oriented. No distress.  Performance status: ECOG 1  Skin:                            LABORATORY DATA: Reviewed by me   Latest Reference Range & Units 09/24/24 12:48 10/21/24 12:48 11/18/24 13:04   WBC 4.0 - 11.0 10e3/uL 4.5 4.1 5.2   Hemoglobin 13.3 - 17.7 g/dL 13.9 14.4 13.4   Hematocrit 40.0 - 53.0 % 41.2 43.0 40.6   Platelet Count 150 - 450 10e3/uL 226 222 214   RBC Count 4.40 - 5.90 10e6/uL 4.67 5.04 4.94   MCV 78 - 100 fL 88 85 82   MCH 26.5 - 33.0 pg 29.8 28.6 27.1   MCHC 31.5 - 36.5 g/dL 33.7 33.5 33.0   RDW 10.0 - 15.0 % 13.4 13.1 13.5   % Neutrophils % 72 79 77   % Lymphocytes % 10 4 4   % Monocytes % 15 14 13   % Eosinophils % 1 1 4   % Basophils % 1 1 1   Absolute Basophils 0.0 - 0.2 10e3/uL 0.1 0.0 0.1   Absolute Eosinophils 0.0 - 0.7 10e3/uL 0.1 0.1 0.2   Absolute Immature Granulocytes <=0.4 10e3/uL 0.0 0.0 0.0   Absolute Lymphocytes 0.8 - 5.3 10e3/uL 0.5 (L) 0.2 (L) 0.2 (L)   Absolute Monocytes 0.0 - 1.3 10e3/uL 0.7 0.6 0.7   % Immature  Granulocytes % 0 0 0   Absolute Neutrophils 1.6 - 8.3 10e3/uL 3.3 3.2 4.0   Absolute NRBCs 10e3/uL 0.0 0.0 0.0   NRBCs per 100 WBC <1 /100 0 0 0   (L): Data is abnormally low     Latest Reference Range & Units 08/26/24 12:23 09/24/24 12:48 10/21/24 12:48 11/18/24 13:04   Sodium 135 - 145 mmol/L 137 137 137 137   Potassium 3.4 - 5.3 mmol/L 4.1 4.5 4.1 4.2   Chloride 98 - 107 mmol/L 102 101 101 101   Carbon Dioxide (CO2) 22 - 29 mmol/L 25 27 27 27   Urea Nitrogen 8.0 - 23.0 mg/dL 11.9 8.3 9.1 11.2   Creatinine 0.67 - 1.17 mg/dL 0.88 0.82 0.88 0.83   GFR Estimate >60 mL/min/1.73m2 >90 >90 >90 >90   Calcium 8.8 - 10.4 mg/dL 9.1 9.4 9.3 9.2   Anion Gap 7 - 15 mmol/L 10 9 9 9   Albumin 3.5 - 5.2 g/dL 4.0 4.1 4.1 3.9   Protein Total 6.4 - 8.3 g/dL 6.6 7.1 6.9 6.6   Alkaline Phosphatase 40 - 150 U/L 78 87 83 79   ALT 0 - 70 U/L 14 20 16 16   AST 0 - 45 U/L 20 25 24 21   Bilirubin Total <=1.2 mg/dL 0.9 1.0 0.8 0.8   Glucose 70 - 99 mg/dL 94 89 101 (H) 82   Lactate Dehydrogenase 0 - 250 U/L 213   177   (H): Data is abnormally high    CT CHEST ABDOMEN PELVIS W CONTRAST (10/24/2024)  1.  Stable right lower lobe pulmonary nodule. No infiltrate, pleural effusion or pneumothorax.   2.  Mild bilateral axillary lymphadenopathy without significant change. No mediastinal or hilar lymphadenopathy.   3.  Mild circumferentially thickened wall of the lower esophagus is stable and nonspecific. This could be due to reflux esophagitis.   4.  Stable liver lesions. The largest show densities compatible with cysts. Diffuse fatty infiltration of the liver is again noted. No splenomegaly.   5.  Stable renal lesions likely represent cysts although some are too small to characterize.   6.  Normal caliber bowel without free air or free fluid. Mild bilateral inguinal lymphadenopathy is not significantly changed.     CT SOFT TISSUE NECK W CONTRAST (10/31/2024)  1.  No mucosal space masses or pathologic lymphadenopathy.   2.  Nonspecific borderline  enlarged bilateral level V cervical lymph nodes measuring up to 0.9 cm.   3.  1.7 cm right thyroid lobe nodule. Recommend thyroid ultrasound for further evaluation.     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    Most recent workup including flow cytometry at [facility] and lymph node biopsy in 07/2024 are most consistent with clinical stage IVA2 SS.  He had slight progression of disease back in Aug 2024 and the treatment was switched from ECP + low-dose weekly methotrexate to ECP + mogamulizumab (started Oct 2024, currently receiving under the care of his local oncologist, Dr. Johnson    We discussed the goal of balancing the benefits of treatment with side effects, potential toxicity, and need for visits and supportive care - and that the goal is to maximize the treatment benefit with as little impact on quality of life as possible.  Treatments short of alloBMT are not curative and sequential therapy, maximizing the benefit from each line of therapy, is the usual approach with years of disease control being realistic - but responses and outcomes vary widely.  We had been considering increasing ECP to 2 days every 2 weeks, but he feels that traveling for ECP every 2 weeks would be too disruptive to his life - so we will hold off on that.  Radiation to any sites of bothersome or threatening lymphoma can always be considered.      Overall, he has good clinical response to current treatment with ECP + mogamulizumab. CT showed stable lymphadenopathy (noted that this was compared to Apr 2024 instead of Aug 2024 when he had disease progression) with overall improvement of his symptoms. Rashes are waxed and waned but he is still early on the treatment. Continue topical steroids and skin moisturizing.    Regarding incidental findings on thyroid nodules, we recommended US + FNA, this can be done closer to his  home.    There are no active ID issues.  I have recommended Prevnar 20, the Shingrix vaccine series, a flu shot, RSV vaccine, and an updated COVID-19 vaccine if he has not received these.      He will continue to work with his primary care clinic for health maintenance and other medical issues.  We will keep them in the loop.    I discussed the plan with Dr. Rush Johnson. He will continue to work with Dr. Ezra Rico from the Derm Lymphoma Team for skin directed therapy and ECP.  We will request a visit with Dr. Everett in about 6 months.  I reminded him to contact us if questions, concerns, or new issues come up between visits.    Patient was seen and plan of care was discussed with attending physician Dr. Everett.    Jayy Gill MD  Hematology/Medical Oncology/BMT (PGY-6)  P: [phone]      ATTENDING ATTESTATION:  The patient was seen and evaluated by me.  I have reviewed the vital signs, medications, labs, and imaging results independently, and have discussed the patient and plan with the resident/fellow, and agree with the findings and plan outlined in this note.  The impression and plan were jointly made.    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  To summarize his course, he had a skin rash involving the arms that waxed an waned since about 2011.  It progressed to involve his torso.  Rash was raised, red/purple in color, and intermittently itched.  There was limited benefit from topical creams.  Skin biopsy in early 2021 suggested interface dermatitis.  Skin biopsy in 07/2021 had atypical lymphoid infiltrates but with no specific histologic diagnosis.  Skin biopsy in 01/2024 was felt to be most consistent with MF.  Bone marrow biopsy in 02/2024 had no  obvious bone marrow involvement but peripheral blood flow cytometry showed about 0.5 x 10^9/L clonal T cells (Sezary cells).  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  He had received NBUVB treatment in the past with some improvement in itching and some relief from topical steroids but not durable.  Connected with our Derm Lymphoma Team in 02/2024 and started ECP and weekly low-dose methotrexate.  He had a CT scan on 4/26/2024 that showed prominent axillary lymphadenopathy on the right greater than left and slightly enlarged bilateral inguinal lymphadenopathy, and core needle biopsy of a left axillary lymph node in 07/2024 was consistent with involvement by MF with minimal CD30 expression and no evidence of large cell transformation.  He had a visit at [facility] for another opinion in 07/2024, workup including peripheral blood flow cytometry included about 1.1 x 10^9/L clonal T cells - overall consistent with clinical stage IVA2 SS, and they recommended treatment with mogamulizumab.  He stopped methotrexate and started mogamulizumab in 08/2024 with continued ECP.  Itching and rash flared a bit in the first month of mogamulizumab treatment, biopsy only showed MF, and it slowly improved.  CT imaging in 10/2024 showed stable to improved modest lymphadenopathy with no evidence of progressive lymphoma.  Visit for ongoing management of MF/SS.    He is with wife Irma.  Rash waxing and waning - overall somewhat better.  Itching and energy level much better.  Back working.    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    Most recent workup was consistent with clinical stage IVA2 SS.   There was no meaningful improvement with about 6 months of once monthly ECP and low-dose weekly methotrexate.  After an initial flare of symptoms and rash, there appears to be slow improvement in the diffuse rash, itching, and fatigue with the current treatment with mogamulizumab every 2 weeks and ECP one day monthly (not doing 2 days to avoid more time away from home).  Imaging suggests stable to improved lymphadenopathy (note that outside read compared to 04/2024 CT and not 08/2024 PET/CT).  At this point, I would recommend continuing the current treatment with Dr. Rush Johnson and his local Oncology Team as well as skin-directed therapy and ECP with Dr. Ezra Rico and our Derm Lymphoma Team.  We can hold off imaging unless clinical indications arise.  They agree with the plan.    He will work with Dr. Johnson to evaluate the incidental thyroid nodule.    We have discussed the goal of balancing the benefits of treatment with side effects, potential toxicity, and need for visits and supportive care - and that the goal is to maximize the treatment benefit with as little impact on quality of life as possible.  Treatments short of alloBMT are not curative and sequential therapy, maximizing the benefit from each line of therapy, is the usual approach with years of disease control being realistic - but responses and outcomes vary widely.    There are no active ID issues.  He is up to date for Prevnar 20 and Shingrix vaccines.  I recommended a flu shot, RSV vaccine, and current COVID-19 vaccine if he has not received these.    He will continue to work with his primary care clinic for health maintenance and other medical issues.    We discussed the plan with Dr. Rush Johnson.  We will request a visit with me in about 6 months but can see him sooner if new issues come up.  I reminded him to contact us if questions, concerns, or new issues come up between visits.    The longitudinal plan of care for the  diagnosis(es)/condition(s) as documented were addressed during this visit. Due to the added complexity in care, I will continue to support Blane in the subsequent management and with ongoing continuity of care.    Roberto Everett MD, PhD  Attending Physician, [facility]  [phone] clinic       Again, thank you for allowing me to participate in the care of your patient.        Sincerely,        Roberto Everett MD

## 2024-11-20 NOTE — PROGRESS NOTES
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  To summarize his course, he had a skin rash involving the arms that waxed an waned since about 2011.  It progressed to involve his torso.  Rash was raised, red/purple in color, and intermittently itched.  There was limited benefit from topical creams.  Skin biopsy in early 2021 suggested interface dermatitis.  Skin biopsy in 07/2021 had atypical lymphoid infiltrates but with no specific histologic diagnosis.  Skin biopsy in 01/2024 was felt to be most consistent with MF.  Bone marrow biopsy in 02/2024 had no obvious bone marrow involvement but peripheral blood flow cytometry showed about 0.5 x 10^9/L clonal T cells (Sezary cells).  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  He had received NBUVB treatment in the past with some improvement in itching and some relief from topical steroids but not durable.  Connected with our Derm Lymphoma Team in 02/2024 and started ECP and weekly low-dose methotrexate.  He had a CT scan on 4/26/2024 that showed prominent axillary lymphadenopathy on the right greater than left and slightly enlarged bilateral inguinal lymphadenopathy, and core needle biopsy of a left axillary lymph node in 07/2024 was consistent with involvement by MF with minimal CD30 expression and no evidence of large cell transformation.  He had a visit at [facility] for another opinion in 07/2024, workup including peripheral blood flow cytometry included about 1.1 x 10^9/L clonal T cells - overall consistent with clinical stage IVA2 SS, and they recommended treatment with mogamulizumab.  Visit for ongoing management of MF vs SS.    He is with his wife Irma, not  with his son Sterling today.  Started on treatment with mogamulizumab in early October, continued on photopheresis for two days a month. Rashes has been waxed and waned, not as itchy as he uses steroid cream about once a week now. Some new rashes on legs after treatment initiated. Skin peeling in feet is slightly better. Energy level has significantly improved since the start of the treatment. Palpable lumps are smaller as well.    PAST MEDICAL HISTORY:  Prothrombin gene mutation, hyperlipidemia    MEDICATIONS:  Current Outpatient Medications   Medication Sig Dispense Refill    prochlorperazine (COMPAZINE) 10 MG tablet Take 10 mg by mouth.      acetaminophen (TYLENOL) 325 MG tablet Take 650 mg by mouth      cetirizine (ZYRTEC) 10 MG tablet Take 10 mg by mouth      clobetasol (TEMOVATE) 0.05 % external ointment Apply topically 2 times daily. Apply thin layer to rash on the legs twice daily 60 g 4    clobetasol (TEMOVATE) 0.05 % external ointment Apply topically 2 times daily 60 g 3    clobetasol propionate (CLOBEX) 0.05 % external shampoo       folic acid (FOLVITE) 1 MG tablet Take 1 tablet (1 mg) by mouth daily , except on days that you take methotrexate 26 tablet 11    hydrOXYzine HCl (ATARAX) 25 MG tablet Take 1-2 tablets (25-50 mg) by mouth 3 times daily as needed for itching 60 tablet 2    methotrexate 2.5 MG tablet Take 10 tablets (25 mg) by mouth every 7 days . Take the same day of the week. 40 tablet 5    naltrexone (DEPADE/REVIA) 50 MG tablet Take 0.5 tablets (25 mg) by mouth daily. 30 tablet 3    omeprazole (PRILOSEC) 10 MG DR capsule Take 20 mg by mouth daily      pseudoePHEDrine (SUDAFED) 30 MG tablet Take 30 mg by mouth      tazarotene (TAZORAC) 0.1 % external cream Apply to areas of thick scaling on hands and feet daily 60 g 3    triamcinolone (KENALOG) 0.1 % external cream APPLY TO AFFECTED AREA 2 TIMES A DAY FOR 10 DAYS TO ARMS, CHEST, NECK, AND BACK       No current facility-administered medications  for this visit.     SOCIAL HISTORY:  never smoker, , 3 kids, contractor, lives in [place], [place]    PHYSICAL EXAMINATION:  /77 (BP Location: Right arm, Patient Position: Sitting, Cuff Size: Adult Regular)   Pulse 63   Temp 98.4  F (36.9  C) (Oral)   Resp 20   Wt 75.2 kg (165 lb 11.2 oz)   SpO2 98%   BMI 26.74 kg/m    Wt Readings from Last 5 Encounters:   11/20/24 75.2 kg (165 lb 11.2 oz)   11/19/24 74.8 kg (164 lb 14.5 oz)   11/18/24 74.8 kg (164 lb 14.5 oz)   08/07/24 77.1 kg (170 lb)   07/29/24 76.7 kg (169 lb 1.5 oz)     General: Well appearing.  HEENT: Sclerae anicteric.  Lungs: Breathing comfortably. No cough.  MSK: Grossly normal movement.  Neuro: Grossly non-focal.  Psych: Alert and oriented. No distress.  Performance status: ECOG 1  Skin:                            LABORATORY DATA: Reviewed by me   Latest Reference Range & Units 09/24/24 12:48 10/21/24 12:48 11/18/24 13:04   WBC 4.0 - 11.0 10e3/uL 4.5 4.1 5.2   Hemoglobin 13.3 - 17.7 g/dL 13.9 14.4 13.4   Hematocrit 40.0 - 53.0 % 41.2 43.0 40.6   Platelet Count 150 - 450 10e3/uL 226 222 214   RBC Count 4.40 - 5.90 10e6/uL 4.67 5.04 4.94   MCV 78 - 100 fL 88 85 82   MCH 26.5 - 33.0 pg 29.8 28.6 27.1   MCHC 31.5 - 36.5 g/dL 33.7 33.5 33.0   RDW 10.0 - 15.0 % 13.4 13.1 13.5   % Neutrophils % 72 79 77   % Lymphocytes % 10 4 4   % Monocytes % 15 14 13   % Eosinophils % 1 1 4   % Basophils % 1 1 1   Absolute Basophils 0.0 - 0.2 10e3/uL 0.1 0.0 0.1   Absolute Eosinophils 0.0 - 0.7 10e3/uL 0.1 0.1 0.2   Absolute Immature Granulocytes <=0.4 10e3/uL 0.0 0.0 0.0   Absolute Lymphocytes 0.8 - 5.3 10e3/uL 0.5 (L) 0.2 (L) 0.2 (L)   Absolute Monocytes 0.0 - 1.3 10e3/uL 0.7 0.6 0.7   % Immature Granulocytes % 0 0 0   Absolute Neutrophils 1.6 - 8.3 10e3/uL 3.3 3.2 4.0   Absolute NRBCs 10e3/uL 0.0 0.0 0.0   NRBCs per 100 WBC <1 /100 0 0 0   (L): Data is abnormally low     Latest Reference Range & Units 08/26/24 12:23 09/24/24 12:48 10/21/24 12:48  11/18/24 13:04   Sodium 135 - 145 mmol/L 137 137 137 137   Potassium 3.4 - 5.3 mmol/L 4.1 4.5 4.1 4.2   Chloride 98 - 107 mmol/L 102 101 101 101   Carbon Dioxide (CO2) 22 - 29 mmol/L 25 27 27 27   Urea Nitrogen 8.0 - 23.0 mg/dL 11.9 8.3 9.1 11.2   Creatinine 0.67 - 1.17 mg/dL 0.88 0.82 0.88 0.83   GFR Estimate >60 mL/min/1.73m2 >90 >90 >90 >90   Calcium 8.8 - 10.4 mg/dL 9.1 9.4 9.3 9.2   Anion Gap 7 - 15 mmol/L 10 9 9 9   Albumin 3.5 - 5.2 g/dL 4.0 4.1 4.1 3.9   Protein Total 6.4 - 8.3 g/dL 6.6 7.1 6.9 6.6   Alkaline Phosphatase 40 - 150 U/L 78 87 83 79   ALT 0 - 70 U/L 14 20 16 16   AST 0 - 45 U/L 20 25 24 21   Bilirubin Total <=1.2 mg/dL 0.9 1.0 0.8 0.8   Glucose 70 - 99 mg/dL 94 89 101 (H) 82   Lactate Dehydrogenase 0 - 250 U/L 213   177   (H): Data is abnormally high    CT CHEST ABDOMEN PELVIS W CONTRAST (10/24/2024)  1.  Stable right lower lobe pulmonary nodule. No infiltrate, pleural effusion or pneumothorax.   2.  Mild bilateral axillary lymphadenopathy without significant change. No mediastinal or hilar lymphadenopathy.   3.  Mild circumferentially thickened wall of the lower esophagus is stable and nonspecific. This could be due to reflux esophagitis.   4.  Stable liver lesions. The largest show densities compatible with cysts. Diffuse fatty infiltration of the liver is again noted. No splenomegaly.   5.  Stable renal lesions likely represent cysts although some are too small to characterize.   6.  Normal caliber bowel without free air or free fluid. Mild bilateral inguinal lymphadenopathy is not significantly changed.     CT SOFT TISSUE NECK W CONTRAST (10/31/2024)  1.  No mucosal space masses or pathologic lymphadenopathy.   2.  Nonspecific borderline enlarged bilateral level V cervical lymph nodes measuring up to 0.9 cm.   3.  1.7 cm right thyroid lobe nodule. Recommend thyroid ultrasound for further evaluation.     IMPRESSION AND PLAN:  Blane Ugalde is a 63 year old with cutaneous T cell lymphoma most  consistent with mycosis fungoides (MF) vs Sézary Syndrome (SS).    Most recent workup including flow cytometry at [facility] and lymph node biopsy in 07/2024 are most consistent with clinical stage IVA2 SS.  He had slight progression of disease back in Aug 2024 and the treatment was switched from ECP + low-dose weekly methotrexate to ECP + mogamulizumab (started Oct 2024, currently receiving under the care of his local oncologist, Dr. Johnson    We discussed the goal of balancing the benefits of treatment with side effects, potential toxicity, and need for visits and supportive care - and that the goal is to maximize the treatment benefit with as little impact on quality of life as possible.  Treatments short of alloBMT are not curative and sequential therapy, maximizing the benefit from each line of therapy, is the usual approach with years of disease control being realistic - but responses and outcomes vary widely.  We had been considering increasing ECP to 2 days every 2 weeks, but he feels that traveling for ECP every 2 weeks would be too disruptive to his life - so we will hold off on that.  Radiation to any sites of bothersome or threatening lymphoma can always be considered.      Overall, he has good clinical response to current treatment with ECP + mogamulizumab. CT showed stable lymphadenopathy (noted that this was compared to Apr 2024 instead of Aug 2024 when he had disease progression) with overall improvement of his symptoms. Rashes are waxed and waned but he is still early on the treatment. Continue topical steroids and skin moisturizing.    Regarding incidental findings on thyroid nodules, we recommended US + FNA, this can be done closer to his home.    There are no active ID issues.  I have recommended Prevnar 20, the Shingrix vaccine series, a flu shot, RSV vaccine, and an updated COVID-19 vaccine if he has not received these.      He will continue to work with his primary care clinic for health maintenance  and other medical issues.  We will keep them in the loop.    I discussed the plan with Dr. Rush Johnson. He will continue to work with Dr. Ezra Rico from the Derm Lymphoma Team for skin directed therapy and ECP.  We will request a visit with Dr. Everett in about 6 months.  I reminded him to contact us if questions, concerns, or new issues come up between visits.    Patient was seen and plan of care was discussed with attending physician Dr. Everett.    Jayy Gill MD  Hematology/Medical Oncology/BMT (PGY-6)  P: [phone]      ATTENDING ATTESTATION:  The patient was seen and evaluated by me.  I have reviewed the vital signs, medications, labs, and imaging results independently, and have discussed the patient and plan with the resident/fellow, and agree with the findings and plan outlined in this note.  The impression and plan were jointly made.    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  To summarize his course, he had a skin rash involving the arms that waxed an waned since about 2011.  It progressed to involve his torso.  Rash was raised, red/purple in color, and intermittently itched.  There was limited benefit from topical creams.  Skin biopsy in early 2021 suggested interface dermatitis.  Skin biopsy in 07/2021 had atypical lymphoid infiltrates but with no specific histologic diagnosis.  Skin biopsy in 01/2024 was felt to be most consistent with MF.  Bone marrow biopsy in 02/2024 had no obvious bone marrow involvement but peripheral blood flow cytometry showed about 0.5 x 10^9/L clonal T cells (Sezary cells).  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  He had received NBUVB  treatment in the past with some improvement in itching and some relief from topical steroids but not durable.  Connected with our Derm Lymphoma Team in 02/2024 and started ECP and weekly low-dose methotrexate.  He had a CT scan on 4/26/2024 that showed prominent axillary lymphadenopathy on the right greater than left and slightly enlarged bilateral inguinal lymphadenopathy, and core needle biopsy of a left axillary lymph node in 07/2024 was consistent with involvement by MF with minimal CD30 expression and no evidence of large cell transformation.  He had a visit at [facility] for another opinion in 07/2024, workup including peripheral blood flow cytometry included about 1.1 x 10^9/L clonal T cells - overall consistent with clinical stage IVA2 SS, and they recommended treatment with mogamulizumab.  He stopped methotrexate and started mogamulizumab in 08/2024 with continued ECP.  Itching and rash flared a bit in the first month of mogamulizumab treatment, biopsy only showed MF, and it slowly improved.  CT imaging in 10/2024 showed stable to improved modest lymphadenopathy with no evidence of progressive lymphoma.  Visit for ongoing management of MF/SS.    He is with wife Irma.  Rash waxing and waning - overall somewhat better.  Itching and energy level much better.  Back working.    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    Most recent workup was consistent with clinical stage IVA2 SS.  There was no meaningful improvement with about 6 months of once monthly ECP and low-dose weekly methotrexate.  After an initial flare of symptoms and rash, there appears to be slow improvement in the diffuse rash, itching, and fatigue with the current treatment  with mogamulizumab every 2 weeks and ECP one day monthly (not doing 2 days to avoid more time away from home).  Imaging suggests stable to improved lymphadenopathy (note that outside read compared to 04/2024 CT and not 08/2024 PET/CT).  At this point, I would recommend continuing the current treatment with Dr. Rush Johnson and his local Oncology Team as well as skin-directed therapy and ECP with Dr. Ezra Rico and our Derm Lymphoma Team.  We can hold off imaging unless clinical indications arise.  They agree with the plan.    He will work with Dr. Johnson to evaluate the incidental thyroid nodule.    We have discussed the goal of balancing the benefits of treatment with side effects, potential toxicity, and need for visits and supportive care - and that the goal is to maximize the treatment benefit with as little impact on quality of life as possible.  Treatments short of alloBMT are not curative and sequential therapy, maximizing the benefit from each line of therapy, is the usual approach with years of disease control being realistic - but responses and outcomes vary widely.    There are no active ID issues.  He is up to date for Prevnar 20 and Shingrix vaccines.  I recommended a flu shot, RSV vaccine, and current COVID-19 vaccine if he has not received these.    He will continue to work with his primary care clinic for health maintenance and other medical issues.    We discussed the plan with Dr. Rush Johnson.  We will request a visit with me in about 6 months but can see him sooner if new issues come up.  I reminded him to contact us if questions, concerns, or new issues come up between visits.    The longitudinal plan of care for the diagnosis(es)/condition(s) as documented were addressed during this visit. Due to the added complexity in care, I will continue to support Blane in the subsequent management and with ongoing continuity of care.    Roberto Everett MD, PhD  Attending Physician, [facility]  Cancer [facility]  [phone] clinic

## 2024-11-20 NOTE — NURSING NOTE
"Oncology Rooming Note    November 20, 2024 7:54 AM   Blane Ugalde is a 63 year old male who presents for:    Chief Complaint   Patient presents with    Oncology Clinic Visit     Lymphoproliferative disorder     Initial Vitals: /77 (BP Location: Right arm, Patient Position: Sitting, Cuff Size: Adult Regular)   Pulse 63   Temp 98.4  F (36.9  C) (Oral)   Resp 20   Wt 75.2 kg (165 lb 11.2 oz)   SpO2 98%   BMI 26.74 kg/m   Estimated body mass index is 26.74 kg/m  as calculated from the following:    Height as of 8/7/24: 1.676 m (5' 6\").    Weight as of this encounter: 75.2 kg (165 lb 11.2 oz). Body surface area is 1.87 meters squared.  No Pain (0) Comment: Data Unavailable   No LMP for male patient.  Allergies reviewed: Yes  Medications reviewed: Yes    Medications: Medication refills not needed today.  Pharmacy name entered into EPIC: [id number] - [place], [place] - [facility]    Frailty Screening:   Is the patient here for a new oncology consult visit in cancer care? 2. No      Clinical concerns: review Ct scans.       Obed Coe"

## 2024-12-16 ENCOUNTER — HOSPITAL ENCOUNTER (OUTPATIENT)
Dept: LAB | Facility: CLINIC | Age: 64
Discharge: HOME | End: 2024-12-16
Attending: INTERNAL MEDICINE | Admitting: INTERNAL MEDICINE
Payer: COMMERCIAL

## 2024-12-16 VITALS
RESPIRATION RATE: 16 BRPM | SYSTOLIC BLOOD PRESSURE: 101 MMHG | BODY MASS INDEX: 26.4 KG/M2 | TEMPERATURE: 98.5 F | DIASTOLIC BLOOD PRESSURE: 61 MMHG | HEART RATE: 60 BPM | WEIGHT: 163.58 LBS

## 2024-12-16 DIAGNOSIS — Z79.899 ENCOUNTER FOR LONG-TERM (CURRENT) USE OF HIGH-RISK MEDICATION: ICD-10-CM

## 2024-12-16 LAB
ALBUMIN SERPL BCG-MCNC: 3.8 G/DL (ref 3.5–5.2)
ALP SERPL-CCNC: 95 U/L (ref 40–150)
ALT SERPL W P-5'-P-CCNC: 10 U/L (ref 0–70)
ANION GAP SERPL CALCULATED.3IONS-SCNC: 11 MMOL/L (ref 7–15)
AST SERPL W P-5'-P-CCNC: 21 U/L (ref 0–45)
BASOPHILS # BLD AUTO: 0.1 10E3/UL (ref 0–0.2)
BASOPHILS NFR BLD AUTO: 2 %
BILIRUB SERPL-MCNC: 0.6 MG/DL
BUN SERPL-MCNC: 10.5 MG/DL (ref 8–23)
CALCIUM SERPL-MCNC: 9 MG/DL (ref 8.8–10.4)
CHLORIDE SERPL-SCNC: 102 MMOL/L (ref 98–107)
CREAT SERPL-MCNC: 0.9 MG/DL (ref 0.67–1.17)
EGFRCR SERPLBLD CKD-EPI 2021: >90 ML/MIN/1.73M2
EOSINOPHIL # BLD AUTO: 0.3 10E3/UL (ref 0–0.7)
EOSINOPHIL NFR BLD AUTO: 7 %
ERYTHROCYTE [DISTWIDTH] IN BLOOD BY AUTOMATED COUNT: 14.7 % (ref 10–15)
GLUCOSE SERPL-MCNC: 138 MG/DL (ref 70–99)
HCO3 SERPL-SCNC: 26 MMOL/L (ref 22–29)
HCT VFR BLD AUTO: 41.5 % (ref 40–53)
HGB BLD-MCNC: 13.5 G/DL (ref 13.3–17.7)
IMM GRANULOCYTES # BLD: 0 10E3/UL
IMM GRANULOCYTES NFR BLD: 0 %
LYMPHOCYTES # BLD AUTO: 0.3 10E3/UL (ref 0.8–5.3)
LYMPHOCYTES NFR BLD AUTO: 5 %
MCH RBC QN AUTO: 26.5 PG (ref 26.5–33)
MCHC RBC AUTO-ENTMCNC: 32.5 G/DL (ref 31.5–36.5)
MCV RBC AUTO: 81 FL (ref 78–100)
MONOCYTES # BLD AUTO: 0.5 10E3/UL (ref 0–1.3)
MONOCYTES NFR BLD AUTO: 10 %
NEUTROPHILS # BLD AUTO: 3.5 10E3/UL (ref 1.6–8.3)
NEUTROPHILS NFR BLD AUTO: 76 %
NRBC # BLD AUTO: 0 10E3/UL
NRBC BLD AUTO-RTO: 0 /100
PLATELET # BLD AUTO: 213 10E3/UL (ref 150–450)
POTASSIUM SERPL-SCNC: 4 MMOL/L (ref 3.4–5.3)
PROT SERPL-MCNC: 6.8 G/DL (ref 6.4–8.3)
RBC # BLD AUTO: 5.1 10E6/UL (ref 4.4–5.9)
SODIUM SERPL-SCNC: 139 MMOL/L (ref 135–145)
WBC # BLD AUTO: 4.6 10E3/UL (ref 4–11)

## 2024-12-16 PROCEDURE — 250N000009 HC RX 250

## 2024-12-16 PROCEDURE — 36415 COLL VENOUS BLD VENIPUNCTURE: CPT

## 2024-12-16 PROCEDURE — 36522 PHOTOPHERESIS: CPT

## 2024-12-16 PROCEDURE — 85041 AUTOMATED RBC COUNT: CPT

## 2024-12-16 PROCEDURE — 82310 ASSAY OF CALCIUM: CPT

## 2024-12-16 PROCEDURE — 82565 ASSAY OF CREATININE: CPT

## 2024-12-16 PROCEDURE — 85004 AUTOMATED DIFF WBC COUNT: CPT

## 2024-12-16 RX ADMIN — ANTICOAGULANT CITRATE DEXTROSE SOLUTION FORMULA A 205 ML: 12.25; 11; 3.65 SOLUTION INTRAVENOUS at 12:22

## 2024-12-16 NOTE — PROCEDURES
Transfusion Medicine  Apheresis Procedure Note    Blane Ugalde [id number]   YOB: 1960 Age: 63 year old         Procedure:  Extracorporeal Photopheresis (ECP)             Assessment and Plan:   Blane Ugalde is a 63 year old male with history of cutaneous T cell lymphoma (mycosis fungoides)/Sézary syndrome who  is undergoing his 1st extracorporeal photopheresis therapy this week .  He is tolerated the procedure well and his next one is scheduled for tomorrow            History of Present Illness:   Blane Ugalde is a 63 year old male with an h/o hyperlipidemia. a prothrombin gene mutation. and  cutaneous T-cell lymphoma (CTCL)/Sézary syndrome. To summarize his course, he had a skin rash involving the arms that waxed and waned since about 2011. It progressed to involve his torso. Rash was raised, red/purple in color, and intermittently itched but basically did not improve with topical creams.  2 skin biopsies in 2021 were non-diagnostic, but one performed 01/2024 was felt to be most consistent with MF.   ECP treatment was started in March 2024.      He currently undergoes 2 procedures per month on two consecutive days.             Past Medical History:     CTCL          Past Surgical History:     Past Surgical History:   Procedure Laterality Date    APPENDECTOMY      IR LYMPH NODE BIOPSY  7/2/2024             Allergies:   No Known Allergies          Medications:     Current Outpatient Medications   Medication Sig Dispense Refill    acetaminophen (TYLENOL) 325 MG tablet Take 650 mg by mouth      cetirizine (ZYRTEC) 10 MG tablet Take 10 mg by mouth      clobetasol (TEMOVATE) 0.05 % external ointment Apply topically 2 times daily. Apply thin layer to rash on the legs twice daily 60 g 4    clobetasol (TEMOVATE) 0.05 % external ointment Apply topically 2 times daily 60 g 3    clobetasol propionate (CLOBEX) 0.05 % external shampoo       folic acid (FOLVITE) 1 MG tablet Take 1 tablet (1 mg) by mouth  daily , except on days that you take methotrexate 26 tablet 11    hydrOXYzine HCl (ATARAX) 25 MG tablet Take 1-2 tablets (25-50 mg) by mouth 3 times daily as needed for itching 60 tablet 2    methotrexate 2.5 MG tablet Take 10 tablets (25 mg) by mouth every 7 days . Take the same day of the week. 40 tablet 5    naltrexone (DEPADE/REVIA) 50 MG tablet Take 0.5 tablets (25 mg) by mouth daily. 30 tablet 3    omeprazole (PRILOSEC) 10 MG DR capsule Take 20 mg by mouth daily      prochlorperazine (COMPAZINE) 10 MG tablet Take 10 mg by mouth.      pseudoePHEDrine (SUDAFED) 30 MG tablet Take 30 mg by mouth      tazarotene (TAZORAC) 0.1 % external cream Apply to areas of thick scaling on hands and feet daily 60 g 3    triamcinolone (KENALOG) 0.1 % external cream APPLY TO AFFECTED AREA 2 TIMES A DAY FOR 10 DAYS TO ARMS, CHEST, NECK, AND BACK       Current Facility-Administered Medications   Medication Dose Route Frequency Provider Last Rate Last Admin    methoxsalen (photopheresis) SOLN   Apheresis DOES NOT GO TO Ian Choe MD                Exam:   /61   Pulse 60   Temp 98.5  F (36.9  C) (Oral)   Resp 16   Wt 74.2 kg (163 lb 9.3 oz)   BMI 26.40 kg/m    Patient sleeping comfortably in no apparent distress  Breathing appears comfortable on room air  Peripheral IV access  was used for the procedure.             Data:     Results for orders placed or performed during the hospital encounter of 12/16/24   Comprehensive metabolic panel     Status: Abnormal   Result Value Ref Range    Sodium 139 135 - 145 mmol/L    Potassium 4.0 3.4 - 5.3 mmol/L    Carbon Dioxide (CO2) 26 22 - 29 mmol/L    Anion Gap 11 7 - 15 mmol/L    Urea Nitrogen 10.5 8.0 - 23.0 mg/dL    Creatinine 0.90 0.67 - 1.17 mg/dL    GFR Estimate >90 >60 mL/min/1.73m2    Calcium 9.0 8.8 - 10.4 mg/dL    Chloride 102 98 - 107 mmol/L    Glucose 138 (H) 70 - 99 mg/dL    Alkaline Phosphatase 95 40 - 150 U/L    AST 21 0 - 45 U/L    ALT 10 0 - 70 U/L     Protein Total 6.8 6.4 - 8.3 g/dL    Albumin 3.8 3.5 - 5.2 g/dL    Bilirubin Total 0.6 <=1.2 mg/dL   CBC with platelets and differential     Status: Abnormal   Result Value Ref Range    WBC Count 4.6 4.0 - 11.0 10e3/uL    RBC Count 5.10 4.40 - 5.90 10e6/uL    Hemoglobin 13.5 13.3 - 17.7 g/dL    Hematocrit 41.5 40.0 - 53.0 %    MCV 81 78 - 100 fL    MCH 26.5 26.5 - 33.0 pg    MCHC 32.5 31.5 - 36.5 g/dL    RDW 14.7 10.0 - 15.0 %    Platelet Count 213 150 - 450 10e3/uL    % Neutrophils 76 %    % Lymphocytes 5 %    % Monocytes 10 %    % Eosinophils 7 %    % Basophils 2 %    % Immature Granulocytes 0 %    NRBCs per 100 WBC 0 <1 /100    Absolute Neutrophils 3.5 1.6 - 8.3 10e3/uL    Absolute Lymphocytes 0.3 (L) 0.8 - 5.3 10e3/uL    Absolute Monocytes 0.5 0.0 - 1.3 10e3/uL    Absolute Eosinophils 0.3 0.0 - 0.7 10e3/uL    Absolute Basophils 0.1 0.0 - 0.2 10e3/uL    Absolute Immature Granulocytes 0.0 <=0.4 10e3/uL    Absolute NRBCs 0.0 10e3/uL   CBC with platelets differential     Status: Abnormal    Narrative    The following orders were created for panel order CBC with platelets differential.  Procedure                               Abnormality         Status                     ---------                               -----------         ------                     CBC with platelets and d...[[id number]]  Abnormal            Final result                 Please view results for these tests on the individual orders.              Procedure Summary:    A photopheresis was performed and peripheral veins were used for access. Citrate  was the anticoagulant during the procedure.   The patient's vital signs are currently stable and he is tolerating the procedure well.     Attestation: During the procedure the patient was directly seen and evaluated by me, Alexa Alford MD.  I have reviewed the chart and pertinent laboratory findings, and discussed the patient and the current procedure with the Apheresis nursing staff.    Alexa NG  MD [place]  Transfusion Medicine Attending  Laboratory Medicine & Pathology

## 2024-12-17 ENCOUNTER — HOSPITAL ENCOUNTER (OUTPATIENT)
Dept: LAB | Facility: CLINIC | Age: 64
Discharge: HOME | End: 2024-12-17
Attending: INTERNAL MEDICINE | Admitting: INTERNAL MEDICINE
Payer: COMMERCIAL

## 2024-12-17 VITALS
RESPIRATION RATE: 16 BRPM | WEIGHT: 163.58 LBS | DIASTOLIC BLOOD PRESSURE: 60 MMHG | HEART RATE: 56 BPM | BODY MASS INDEX: 26.4 KG/M2 | TEMPERATURE: 98.1 F | SYSTOLIC BLOOD PRESSURE: 105 MMHG

## 2024-12-17 PROCEDURE — 250N000009 HC RX 250

## 2024-12-17 PROCEDURE — 36522 PHOTOPHERESIS: CPT

## 2024-12-17 RX ADMIN — ANTICOAGULANT CITRATE DEXTROSE SOLUTION FORMULA A 205 ML: 12.25; 11; 3.65 SOLUTION INTRAVENOUS at 10:52

## 2024-12-17 NOTE — PROCEDURES
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  He tolerated the procedure well.  Next procedure in approximately one month.         History of Present Illness:   Blane Ugalde is a 63 year old male with an h/o hyperlipidemia. a prothrombin gene mutation. and  cutaneous T-cell lymphoma (CTCL)/Sézary syndrome. To summarize his course, he had a skin rash involving the arms that waxed and waned since about 2011. It progressed to involve his torso. Rash was raised, red/purple in color, and intermittently itched but basically did not improve with topical creams.  2 skin biopsies in 2021 were non-diagnostic, but one performed 01/2024 was felt to be most consistent with MF.   ECP treatment was started in March 2024.      He currently undergoes 2 procedures per month on two consecutive days.             Past Medical History:     CTCL          Past Surgical History:     Past Surgical History:   Procedure Laterality Date    APPENDECTOMY      IR LYMPH NODE BIOPSY  7/2/2024             Allergies:   No Known Allergies          Medications:     Current Outpatient Medications   Medication Sig Dispense Refill    acetaminophen (TYLENOL) 325 MG tablet Take 650 mg by mouth      cetirizine (ZYRTEC) 10 MG tablet Take 10 mg by mouth      clobetasol (TEMOVATE) 0.05 % external ointment Apply topically 2 times daily. Apply thin layer to rash on the legs twice daily 60 g 4    clobetasol (TEMOVATE) 0.05 % external ointment Apply topically 2 times daily 60 g 3    clobetasol propionate (CLOBEX) 0.05 % external shampoo       folic acid (FOLVITE) 1 MG tablet Take 1 tablet (1 mg) by mouth daily  , except on days that you take methotrexate 26 tablet 11    hydrOXYzine HCl (ATARAX) 25 MG tablet Take 1-2 tablets (25-50 mg) by mouth 3 times daily as needed for itching 60 tablet 2    methotrexate 2.5 MG tablet Take 10 tablets (25 mg) by mouth every 7 days . Take the same day of the week. 40 tablet 5    naltrexone (DEPADE/REVIA) 50 MG tablet Take 0.5 tablets (25 mg) by mouth daily. 30 tablet 3    omeprazole (PRILOSEC) 10 MG DR capsule Take 20 mg by mouth daily      prochlorperazine (COMPAZINE) 10 MG tablet Take 10 mg by mouth.      pseudoePHEDrine (SUDAFED) 30 MG tablet Take 30 mg by mouth      tazarotene (TAZORAC) 0.1 % external cream Apply to areas of thick scaling on hands and feet daily 60 g 3    triamcinolone (KENALOG) 0.1 % external cream APPLY TO AFFECTED AREA 2 TIMES A DAY FOR 10 DAYS TO ARMS, CHEST, NECK, AND BACK       Current Facility-Administered Medications   Medication Dose Route Frequency Provider Last Rate Last Admin    methoxsalen (photopheresis) SOLN   Apheresis DOES NOT GO TO Ian Choe MD                Exam:   /60   Pulse 56   Temp 98.1  F (36.7  C) (Oral)   Resp 16   Wt 74.2 kg (163 lb 9.3 oz)   BMI 26.40 kg/m      Breathing appears comfortable on room air  Peripheral IV access  was used for the procedure.             Data:     No results found for any visits on 12/17/24.             Procedure Summary:    A photopheresis was performed and peripheral veins were used for access. Citrate  was the anticoagulant during the procedure.   The patient's vital signs are currently stable and he is tolerating the procedure well.     ATTESTATION STATEMENT:  I, Alexa Alford MD, PhD., was available by pager during the entire procedure.  I have reviewed the chart and discussed the patient and current procedure with the nursing staff.      Alexa Alford MD  Transfusion Medicine Attending  Laboratory Medicine & Pathology

## 2024-12-17 NOTE — PROCEDURES
Laboratory Medicine and Pathology  Transfusion Medicine - Apheresis Procedure    Blane Ugalde MRN# [id number]   YOB: 1960 Age: 63 year old        Reason for consult: Mycosis fungoides/Sezary syndrome           Assessment and Plan:   The patient is a 63 year old male with mycosis fungoides/Sezary syndrome. He underwent extracorporeal photopheresis. He tolerated the procedure well. Symptoms stable         Chief Complaint:   Skin changes on hands and feet         History of Present Illness:   The patient is a 63 year old male with mycosis fungoides/Sezary syndrome. He has had symptoms for many years. He was first diagnosed in January 2024. He is currently received mogamulizumab and ECP and using topical creams. He has had some improvement in his skin. Skin is red but pruritus is tolerable. He notes that his hands and feet swell and then he will get cracks in his skin.  No large open areas of skin break down. He does have some mouth sores since starting immunotherapy.  He has no systemic symptoms now. In the past has felt cold at warm temperatures. Weight has been stable.          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 Apply thin layer to rash on the legs twice daily 60 g 4    clobetasol (TEMOVATE) 0.05 % external ointment Apply  topically 2 times daily 60 g 3    clobetasol propionate (CLOBEX) 0.05 % external shampoo       folic acid (FOLVITE) 1 MG tablet Take 1 tablet (1 mg) by mouth daily , except on days that you take methotrexate 26 tablet 11    hydrOXYzine HCl (ATARAX) 25 MG tablet Take 1-2 tablets (25-50 mg) by mouth 3 times daily as needed for itching 60 tablet 2    methotrexate 2.5 MG tablet Take 10 tablets (25 mg) by mouth every 7 days . Take the same day of the week. 40 tablet 5    naltrexone (DEPADE/REVIA) 50 MG tablet Take 0.5 tablets (25 mg) by mouth daily. 30 tablet 3    omeprazole (PRILOSEC) 10 MG DR capsule Take 20 mg by mouth daily      prochlorperazine (COMPAZINE) 10 MG tablet Take 10 mg by mouth.      pseudoePHEDrine (SUDAFED) 30 MG tablet Take 30 mg by mouth      tazarotene (TAZORAC) 0.1 % external cream Apply to areas of thick scaling on hands and feet daily 60 g 3    triamcinolone (KENALOG) 0.1 % external cream APPLY TO AFFECTED AREA 2 TIMES A DAY FOR 10 DAYS TO ARMS, CHEST, NECK, AND BACK       Current Facility-Administered Medications   Medication Dose Route Frequency Provider Last Rate Last Admin    Anticoagulant Citrate Dextrose Formula A at ratio of 1:10 with blood (Apheresis Center)   Apheresis During Apheresis (from stock) Ian Ivan MD        methoxsalen (photopheresis) SOLN   Apheresis DOES NOT GO TO [facility] Ian Ivan MD              Review of Systems:   Denied fever, chills, cough, shortness of breath, nausea, vomiting, diarrhea, night sweats  See also above         Exam:   /70 P 63 T 97.7 RR 16 O2 sat 97%  Alert, no apparent distress  Breathing appears comfortable  Erythema of skin, some scaling, skin cracks on some digits of hand  Moves all extremeties  PIV          Data:     Comprehensive metabolic panel   Result Value Ref Range    Sodium 139 135 - 145 mmol/L    Potassium 4.0 3.4 - 5.3 mmol/L    Carbon Dioxide (CO2) 26 22 - 29 mmol/L    Anion Gap 11 7 - 15 mmol/L    Urea Nitrogen 10.5 8.0 -  23.0 mg/dL    Creatinine 0.90 0.67 - 1.17 mg/dL    GFR Estimate >90 >60 mL/min/1.73m2    Calcium 9.0 8.8 - 10.4 mg/dL    Chloride 102 98 - 107 mmol/L    Glucose 138 (H) 70 - 99 mg/dL    Alkaline Phosphatase 95 40 - 150 U/L    AST 21 0 - 45 U/L    ALT 10 0 - 70 U/L    Protein Total 6.8 6.4 - 8.3 g/dL    Albumin 3.8 3.5 - 5.2 g/dL    Bilirubin Total 0.6 <=1.2 mg/dL   CBC with platelets and differential   Result Value Ref Range    WBC Count 4.6 4.0 - 11.0 10e3/uL    RBC Count 5.10 4.40 - 5.90 10e6/uL    Hemoglobin 13.5 13.3 - 17.7 g/dL    Hematocrit 41.5 40.0 - 53.0 %    MCV 81 78 - 100 fL    MCH 26.5 26.5 - 33.0 pg    MCHC 32.5 31.5 - 36.5 g/dL    RDW 14.7 10.0 - 15.0 %    Platelet Count 213 150 - 450 10e3/uL    % Neutrophils 76 %    % Lymphocytes 5 %    % Monocytes 10 %    % Eosinophils 7 %    % Basophils 2 %    % Immature Granulocytes 0 %    NRBCs per 100 WBC 0 <1 /100    Absolute Neutrophils 3.5 1.6 - 8.3 10e3/uL    Absolute Lymphocytes 0.3 (L) 0.8 - 5.3 10e3/uL    Absolute Monocytes 0.5 0.0 - 1.3 10e3/uL    Absolute Eosinophils 0.3 0.0 - 0.7 10e3/uL    Absolute Basophils 0.1 0.0 - 0.2 10e3/uL    Absolute Immature Granulocytes 0.0 <=0.4 10e3/uL    Absolute NRBCs 0.0 10e3/uL          Procedure Summary:   Extracorporeal photopheresis (ECP) was performed using a Cellex device.  The vascular access was peripheral IV.  The circuit was primed ACD-A and then ACD-A was used for anticoagulation during the procedure.  The collected white blood cells were treated with Uvadex (methoxsalen) and then exposed to UVA irradiation in the device.  The treated cells were returned to the patient. Vital signs were stable. The patient tolerated the procedure well.     Attestation: During the procedure the patient was directly seen and evaluated by me, Rona Brown MD, PhD.  I have reviewed the chart and pertinent laboratory findings, and discussed the patient and the current procedure with the Apheresis nursing staff.    Rona  Ant Brown MD, PhD  Transfusion Medicine Attending  Laboratory Medicine & Pathology

## 2025-01-12 RX ORDER — CALCIUM CARBONATE 500 MG/1
500 TABLET, CHEWABLE ORAL
Status: CANCELLED | OUTPATIENT
Start: 2025-01-12

## 2025-01-13 ENCOUNTER — HOSPITAL ENCOUNTER (OUTPATIENT)
Dept: LAB | Facility: CLINIC | Age: 65
Discharge: HOME | End: 2025-01-13
Attending: INTERNAL MEDICINE | Admitting: INTERNAL MEDICINE
Payer: COMMERCIAL

## 2025-01-13 VITALS
RESPIRATION RATE: 16 BRPM | BODY MASS INDEX: 26.55 KG/M2 | DIASTOLIC BLOOD PRESSURE: 76 MMHG | HEART RATE: 61 BPM | TEMPERATURE: 98 F | SYSTOLIC BLOOD PRESSURE: 111 MMHG | WEIGHT: 164.46 LBS

## 2025-01-13 DIAGNOSIS — Z79.899 ENCOUNTER FOR LONG-TERM (CURRENT) USE OF HIGH-RISK MEDICATION: ICD-10-CM

## 2025-01-13 LAB
ALBUMIN SERPL BCG-MCNC: 4 G/DL (ref 3.5–5.2)
ALP SERPL-CCNC: 90 U/L (ref 40–150)
ALT SERPL W P-5'-P-CCNC: 16 U/L (ref 0–70)
ANION GAP SERPL CALCULATED.3IONS-SCNC: 10 MMOL/L (ref 7–15)
AST SERPL W P-5'-P-CCNC: 17 U/L (ref 0–45)
BASOPHILS # BLD AUTO: 0.1 10E3/UL (ref 0–0.2)
BASOPHILS NFR BLD AUTO: 1 %
BILIRUB SERPL-MCNC: 0.6 MG/DL
BUN SERPL-MCNC: 9.7 MG/DL (ref 8–23)
CALCIUM SERPL-MCNC: 9.3 MG/DL (ref 8.8–10.4)
CHLORIDE SERPL-SCNC: 100 MMOL/L (ref 98–107)
CREAT SERPL-MCNC: 0.88 MG/DL (ref 0.67–1.17)
EGFRCR SERPLBLD CKD-EPI 2021: >90 ML/MIN/1.73M2
EOSINOPHIL # BLD AUTO: 0.1 10E3/UL (ref 0–0.7)
EOSINOPHIL NFR BLD AUTO: 2 %
ERYTHROCYTE [DISTWIDTH] IN BLOOD BY AUTOMATED COUNT: 15.9 % (ref 10–15)
GLUCOSE SERPL-MCNC: 76 MG/DL (ref 70–99)
HCO3 SERPL-SCNC: 28 MMOL/L (ref 22–29)
HCT VFR BLD AUTO: 41.4 % (ref 40–53)
HGB BLD-MCNC: 13.4 G/DL (ref 13.3–17.7)
IMM GRANULOCYTES # BLD: 0 10E3/UL
IMM GRANULOCYTES NFR BLD: 0 %
LYMPHOCYTES # BLD AUTO: 0.2 10E3/UL (ref 0.8–5.3)
LYMPHOCYTES NFR BLD AUTO: 3 %
MCH RBC QN AUTO: 26.6 PG (ref 26.5–33)
MCHC RBC AUTO-ENTMCNC: 32.4 G/DL (ref 31.5–36.5)
MCV RBC AUTO: 82 FL (ref 78–100)
MONOCYTES # BLD AUTO: 0.6 10E3/UL (ref 0–1.3)
MONOCYTES NFR BLD AUTO: 10 %
NEUTROPHILS # BLD AUTO: 5.1 10E3/UL (ref 1.6–8.3)
NEUTROPHILS NFR BLD AUTO: 84 %
NRBC # BLD AUTO: 0 10E3/UL
NRBC BLD AUTO-RTO: 0 /100
PLATELET # BLD AUTO: 218 10E3/UL (ref 150–450)
POTASSIUM SERPL-SCNC: 4.1 MMOL/L (ref 3.4–5.3)
PROT SERPL-MCNC: 7 G/DL (ref 6.4–8.3)
RBC # BLD AUTO: 5.03 10E6/UL (ref 4.4–5.9)
SODIUM SERPL-SCNC: 138 MMOL/L (ref 135–145)
WBC # BLD AUTO: 6.1 10E3/UL (ref 4–11)

## 2025-01-13 PROCEDURE — 36522 PHOTOPHERESIS: CPT

## 2025-01-13 PROCEDURE — 85018 HEMOGLOBIN: CPT

## 2025-01-13 PROCEDURE — 85004 AUTOMATED DIFF WBC COUNT: CPT

## 2025-01-13 PROCEDURE — 80053 COMPREHEN METABOLIC PANEL: CPT

## 2025-01-13 PROCEDURE — 250N000009 HC RX 250: Performed by: PATHOLOGY

## 2025-01-13 PROCEDURE — 36415 COLL VENOUS BLD VENIPUNCTURE: CPT

## 2025-01-13 RX ORDER — CALCIUM CARBONATE 500 MG/1
500 TABLET, CHEWABLE ORAL
Status: CANCELLED | OUTPATIENT
Start: 2025-01-13

## 2025-01-13 RX ADMIN — ANTICOAGULANT CITRATE DEXTROSE SOLUTION FORMULA A 211 ML: 12.25; 11; 3.65 SOLUTION INTRAVENOUS at 13:11

## 2025-01-13 NOTE — PROCEDURES
Laboratory Medicine and Pathology  Transfusion Medicine - Apheresis Procedure    Blane Ugalde MRN# [id number]   YOB: 1960 Age: 63 year old        Reason for consult: Mycosis fungoides/Sezary syndrome           Assessment and Plan:   The patient is a 63 year old male with mycosis fungoides/Sezary syndrome. He underwent extracorporeal photopheresis. He tolerated the procedure well. Symptoms stable         Chief Complaint:   Skin changes on hands and feet         History of Present Illness:   The patient is a 63 year old male with mycosis fungoides/Sezary syndrome. He has had symptoms for many years. He was first diagnosed in January 2024. He is currently received mogamulizumab and ECP and using topical creams. He has had some improvement in his skin. Skin is red but pruritus is tolerable. He notes that his hands and feet swell and then he will get cracks in his skin.  No large open areas of skin break down. He does have some mouth sores since starting immunotherapy.  He has no systemic symptoms now. In the past has felt cold at warm temperatures. Weight has been stable.          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      hydrOXYzine HCl (ATARAX) 25 MG tablet Take 1-2 tablets (25-50 mg) by mouth 3 times daily as needed  for itching 60 tablet 2    naltrexone (DEPADE/REVIA) 50 MG tablet Take 0.5 tablets (25 mg) by mouth daily. 30 tablet 3    omeprazole (PRILOSEC) 10 MG DR capsule Take 20 mg by mouth daily      pseudoePHEDrine (SUDAFED) 30 MG tablet Take 30 mg by mouth      tazarotene (TAZORAC) 0.1 % external cream Apply to areas of thick scaling on hands and feet daily 60 g 3    triamcinolone (KENALOG) 0.1 % external cream APPLY TO AFFECTED AREA 2 TIMES A DAY FOR 10 DAYS TO ARMS, CHEST, NECK, AND BACK      acetaminophen (TYLENOL) 325 MG tablet Take 650 mg by mouth      clobetasol (TEMOVATE) 0.05 % external ointment Apply topically 2 times daily. Apply thin layer to rash on the legs twice daily 60 g 4    clobetasol (TEMOVATE) 0.05 % external ointment Apply topically 2 times daily 60 g 3    folic acid (FOLVITE) 1 MG tablet Take 1 tablet (1 mg) by mouth daily , except on days that you take methotrexate 26 tablet 11    methotrexate 2.5 MG tablet Take 10 tablets (25 mg) by mouth every 7 days . Take the same day of the week. 40 tablet 5    prochlorperazine (COMPAZINE) 10 MG tablet Take 10 mg by mouth.       Current Facility-Administered Medications   Medication Dose Route Frequency Provider Last Rate Last Admin    methoxsalen (photopheresis) SOLN   Apheresis DOES NOT GO TO Rona Kumar MD                  Exam:   /76   Pulse 61   Temp 98  F (36.7  C) (Oral)   Resp 16   Wt 74.6 kg (164 lb 7.4 oz)   BMI 26.55 kg/m      Alert, no apparent distress  Breathing appears comfortable  Erythema of skin, some scaling, skin cracks on some digits of hand  Moves all extremeties  PIV          Data:     ROUTINE IP LABS (Last four results)  BMP  Recent Labs   Lab 01/13/25  1301      POTASSIUM 4.1   CHLORIDE 100   TASHA 9.3   CO2 28   BUN 9.7   CR 0.88   GLC 76     CBC  Recent Labs   Lab 01/13/25  1301   WBC 6.1   RBC 5.03   HGB 13.4   HCT 41.4   MCV 82   MCH 26.6   MCHC 32.4   RDW 15.9*        INRNo lab results found in  last 7 days.         Procedure Summary:   Extracorporeal photopheresis (ECP) was performed using a Cellex device.  The vascular access was peripheral IV.  The circuit was primed ACD-A and then ACD-A was used for anticoagulation during the procedure.  The collected white blood cells were treated with Uvadex (methoxsalen) and then exposed to UVA irradiation in the device.  The treated cells were returned to the patient. Vital signs were stable. The patient tolerated the procedure well.     ATTESTATION STATEMENT:  I, Alexa Alford MD, PhD., was available by pager during the entire procedure.  I have reviewed the chart and discussed the patient and current procedure with the nursing staff.    Alexa Alford MD, PhD  Transfusion Medicine Attending  Medical Director, Blood Bank Laboratory  Pager [phone]

## 2025-01-13 NOTE — DISCHARGE INSTRUCTIONS
Apheresis Blood Donor Center Post Instructions  You may feel tired after your procedure today.   Please call your doctor if you have:  bleeding that doesn t stop, fever, pain where a needle or tube (catheter) was placed, seizures, trouble breathing, red urine, nausea or vomiting, other health concerns.     If your symptoms are severe, call 911.  If your veins were used, keep the bandages on for 2-4 hours.  Avoid heavy lifting with your arms.  If bleeding occurs from these sites, apply firm pressure for 5-10 minutes.  Call your physician if bleeding continues.    The Apheresis/Blood Donor Center is open Monday-Friday 7:30 a.m. to 5 p.m.  The phone number is [phone].  A Transfusion Medicine physician can be reached after 5:00 p.m. weekdays and on weekends /Holidays by calling [phone], and asking for the physician on call.      Photopheresis:  Avoid sunlight , and wear UVA-protective, full coverage sunglasses and sunscreen SPF 15 or higher  for 24 hours after your treatment.  The drug used in your treatment makes patients more sensitive to sunlight for about 24 hours after the treatment.

## 2025-01-14 ENCOUNTER — HOSPITAL ENCOUNTER (OUTPATIENT)
Dept: LAB | Facility: CLINIC | Age: 65
Discharge: HOME | End: 2025-01-14
Attending: INTERNAL MEDICINE | Admitting: INTERNAL MEDICINE
Payer: COMMERCIAL

## 2025-01-14 VITALS
RESPIRATION RATE: 16 BRPM | TEMPERATURE: 97.7 F | HEART RATE: 50 BPM | SYSTOLIC BLOOD PRESSURE: 109 MMHG | DIASTOLIC BLOOD PRESSURE: 62 MMHG

## 2025-01-14 PROCEDURE — 36522 PHOTOPHERESIS: CPT

## 2025-01-14 PROCEDURE — 250N000009 HC RX 250: Performed by: PATHOLOGY

## 2025-01-14 RX ADMIN — ANTICOAGULANT CITRATE DEXTROSE SOLUTION FORMULA A 212 ML: 12.25; 11; 3.65 SOLUTION INTRAVENOUS at 08:34

## 2025-01-14 NOTE — PROCEDURES
Laboratory Medicine and Pathology  Transfusion Medicine - Apheresis Procedure    Blane Ugalde MRN# [id number]   YOB: 1960 Age: 63 year old        Reason for consult: Mycosis fungoides/Sezary syndrome           Assessment and Plan:   The patient is a 63 year old male with mycosis fungoides/Sezary syndrome. He underwent extracorporeal photopheresis. He tolerated the procedure well. Symptoms stable         Chief Complaint:   Skin changes on hands and feet         History of Present Illness:   The patient is a 63 year old male with mycosis fungoides/Sezary syndrome. He has had symptoms for many years. He was first diagnosed in January 2024. He is currently received mogamulizumab and ECP and using topical creams. He has had some improvement in his skin. Skin is red but pruritus is tolerable. He notes that his hands and feet swell and then he will get cracks in his skin.  No large open areas of skin break down. He does have some mouth sores since starting immunotherapy.  He has no systemic symptoms now. In the past has felt cold at warm temperatures. Weight has been stable.          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 Apply thin layer to rash on the legs twice daily 60 g 4    clobetasol (TEMOVATE) 0.05 % external ointment Apply  topically 2 times daily 60 g 3    clobetasol propionate (CLOBEX) 0.05 % external shampoo       ferrous sulfate dried 160 (50 Fe) MG tablet Take 1 tablet by mouth daily (with breakfast). 65 mg      hydrOXYzine HCl (ATARAX) 25 MG tablet Take 1-2 tablets (25-50 mg) by mouth 3 times daily as needed for itching 60 tablet 2    methotrexate 2.5 MG tablet Take 10 tablets (25 mg) by mouth every 7 days . Take the same day of the week. 40 tablet 5    naltrexone (DEPADE/REVIA) 50 MG tablet Take 0.5 tablets (25 mg) by mouth daily. 30 tablet 3    omeprazole (PRILOSEC) 10 MG DR capsule Take 20 mg by mouth daily      pseudoePHEDrine (SUDAFED) 30 MG tablet Take 30 mg by mouth      tazarotene (TAZORAC) 0.1 % external cream Apply to areas of thick scaling on hands and feet daily 60 g 3    triamcinolone (KENALOG) 0.1 % external cream APPLY TO AFFECTED AREA 2 TIMES A DAY FOR 10 DAYS TO ARMS, CHEST, NECK, AND BACK      folic acid (FOLVITE) 1 MG tablet Take 1 tablet (1 mg) by mouth daily , except on days that you take methotrexate (Patient not taking: Reported on 1/14/2025) 26 tablet 11    prochlorperazine (COMPAZINE) 10 MG tablet Take 10 mg by mouth.       Current Facility-Administered Medications   Medication Dose Route Frequency Provider Last Rate Last Admin    methoxsalen (photopheresis) SOLN   Apheresis DOES NOT GO TO Alexa Marinelli MD                  Exam:   /62   Pulse 50   Temp 97.7  F (36.5  C) (Oral)   Resp 16     Alert, no apparent distress  Breathing appears comfortable  Erythema of skin, some scaling, skin cracks on some digits of hand  Moves all extremeties  PIV          Data:     ROUTINE IP LABS (Last four results)  BMP  Recent Labs   Lab 01/13/25  1301      POTASSIUM 4.1   CHLORIDE 100   TASHA 9.3   CO2 28   BUN 9.7   CR 0.88   GLC 76     CBC  Recent Labs   Lab 01/13/25  1301   WBC 6.1   RBC 5.03   HGB 13.4   HCT 41.4   MCV 82   MCH 26.6   MCHC 32.4   RDW 15.9*        INRNo lab results found in  last 7 days.         Procedure Summary:   Extracorporeal photopheresis (ECP) was performed using a Cellex device.  The vascular access was peripheral IV.  The circuit was primed ACD-A and then ACD-A was used for anticoagulation during the procedure.  The collected white blood cells were treated with Uvadex (methoxsalen) and then exposed to UVA irradiation in the device.  The treated cells were returned to the patient. Vital signs were stable. The patient tolerated the procedure well.     ATTESTATION STATEMENT:  I, Alexa Alford MD, PhD., was available by pager during the entire procedure.  I have reviewed the chart and discussed the patient and current procedure with the nursing staff.    Alexa Alford MD, PhD  Transfusion Medicine Attending  Medical Director, Blood Bank Laboratory  Pager [phone]

## 2025-01-14 NOTE — DISCHARGE INSTRUCTIONS
Apheresis Blood Donor Center Post Instructions  You may feel tired after your procedure today.   Please call your doctor if you have:  bleeding that doesn t stop, fever, pain where a needle or tube (catheter) was placed, seizures, trouble breathing, red urine, nausea or vomiting, other health concerns.     If your symptoms are severe, call 911.  If your veins were used, keep the bandages on for 2-4 hours.  Avoid heavy lifting with your arms.  If bleeding occurs from these sites, apply firm pressure for 5-10 minutes.  Call your physician if bleeding continues.    The Apheresis/Blood Donor Center is open Monday-Friday 7:30 a.m. to 5 p.m.  The phone number is [phone].  A Transfusion Medicine physician can be reached after 5:00 p.m. weekdays and on weekends /Holidays by calling [phone], and asking for the physician on call.      Photopheresis:  Avoid sunlight , and wear UVA-protective, full coverage sunglasses and sunscreen SPF 15 or higher  for 24 hours after your treatment.  The drug used in your treatment makes patients more sensitive to sunlight for about 24 hours after the treatment.

## 2025-02-10 ENCOUNTER — HOSPITAL ENCOUNTER (OUTPATIENT)
Dept: LAB | Facility: CLINIC | Age: 65
Discharge: HOME | End: 2025-02-10
Attending: STUDENT IN AN ORGANIZED HEALTH CARE EDUCATION/TRAINING PROGRAM | Admitting: STUDENT IN AN ORGANIZED HEALTH CARE EDUCATION/TRAINING PROGRAM
Payer: COMMERCIAL

## 2025-02-10 VITALS
RESPIRATION RATE: 16 BRPM | DIASTOLIC BLOOD PRESSURE: 55 MMHG | TEMPERATURE: 97.9 F | HEART RATE: 52 BPM | SYSTOLIC BLOOD PRESSURE: 99 MMHG

## 2025-02-10 LAB
BASOPHILS # BLD AUTO: 0.1 10E3/UL (ref 0–0.2)
BASOPHILS NFR BLD AUTO: 1 %
EOSINOPHIL # BLD AUTO: 0.1 10E3/UL (ref 0–0.7)
EOSINOPHIL NFR BLD AUTO: 1 %
ERYTHROCYTE [DISTWIDTH] IN BLOOD BY AUTOMATED COUNT: 16.4 % (ref 10–15)
HCT VFR BLD AUTO: 42.7 % (ref 40–53)
HGB BLD-MCNC: 14.1 G/DL (ref 13.3–17.7)
IMM GRANULOCYTES # BLD: 0 10E3/UL
IMM GRANULOCYTES NFR BLD: 0 %
LYMPHOCYTES # BLD AUTO: 0.2 10E3/UL (ref 0.8–5.3)
LYMPHOCYTES NFR BLD AUTO: 4 %
MCH RBC QN AUTO: 27.8 PG (ref 26.5–33)
MCHC RBC AUTO-ENTMCNC: 33 G/DL (ref 31.5–36.5)
MCV RBC AUTO: 84 FL (ref 78–100)
MONOCYTES # BLD AUTO: 0.6 10E3/UL (ref 0–1.3)
MONOCYTES NFR BLD AUTO: 11 %
NEUTROPHILS # BLD AUTO: 4.6 10E3/UL (ref 1.6–8.3)
NEUTROPHILS NFR BLD AUTO: 83 %
NRBC # BLD AUTO: 0 10E3/UL
NRBC BLD AUTO-RTO: 0 /100
PLATELET # BLD AUTO: 207 10E3/UL (ref 150–450)
RBC # BLD AUTO: 5.08 10E6/UL (ref 4.4–5.9)
WBC # BLD AUTO: 5.5 10E3/UL (ref 4–11)

## 2025-02-10 PROCEDURE — 36415 COLL VENOUS BLD VENIPUNCTURE: CPT

## 2025-02-10 PROCEDURE — 85004 AUTOMATED DIFF WBC COUNT: CPT

## 2025-02-10 PROCEDURE — 36522 PHOTOPHERESIS: CPT

## 2025-02-10 PROCEDURE — 250N000009 HC RX 250

## 2025-02-10 RX ORDER — CALCIUM CARBONATE 500 MG/1
500 TABLET, CHEWABLE ORAL
Status: CANCELLED | OUTPATIENT
Start: 2025-02-10

## 2025-02-10 RX ADMIN — ANTICOAGULANT CITRATE DEXTROSE SOLUTION FORMULA A 213 ML: 12.25; 11; 3.65 SOLUTION INTRAVENOUS at 12:57

## 2025-02-10 NOTE — PROCEDURES
Laboratory Medicine and Pathology  Transfusion Medicine - Apheresis Procedure    Blane Ugalde MRN# [id number]   YOB: 1960 Age: 64 year old        Reason for consult: Mycosis fungoides/Sezary syndrome           Assessment and Plan:   The patient is a 64 year old male with mycosis fungoides/Sezary syndrome. He underwent extracorporeal photopheresis this afternoon and tolerated the procedure well. No concerns/complaints.  Symptoms have been stable and much improved since starting ECP.  Continue with plan.  Day #2 ECP is scheduled for tomorrow (2/11/25).         Chief Complaint:   Dry, irritated skin         History of Present Illness:   The patient is a 63 year old male with mycosis fungoides/Sezary syndrome. He has had symptoms for many years. He was first diagnosed in January 2024. He is currently receiving mogamulizumab and ECP and using topical creams. He has had notable improvement in his skin, and pruritus is tolerable.          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          Immunizations:   Has previously received a COVID19 vaccine  Most Recent Immunizations   Administered Date(s) Administered    COVID-19 Monovalent 18+ (Moderna) 04/13/2021    Zoster recombinant adjuvanted (SHINGRIX) 03/15/2024           Allergies:   No Known Allergies          Medications:     Current Outpatient Medications   Medication Sig Dispense Refill    acetaminophen (TYLENOL) 325 MG tablet Take 650 mg by mouth      cetirizine (ZYRTEC) 10 MG tablet Take 10 mg by mouth      clobetasol (TEMOVATE) 0.05 % external ointment Apply topically 2 times daily. Apply thin layer to rash on the legs twice daily 60 g 4     clobetasol (TEMOVATE) 0.05 % external ointment Apply topically 2 times daily 60 g 3    clobetasol propionate (CLOBEX) 0.05 % external shampoo       ferrous sulfate dried 160 (50 Fe) MG tablet Take 1 tablet by mouth daily (with breakfast). 65 mg      folic acid (FOLVITE) 1 MG tablet Take 1 tablet (1 mg) by mouth daily , except on days that you take methotrexate (Patient not taking: Reported on 1/14/2025) 26 tablet 11    hydrOXYzine HCl (ATARAX) 25 MG tablet Take 1-2 tablets (25-50 mg) by mouth 3 times daily as needed for itching 60 tablet 2    methotrexate 2.5 MG tablet Take 10 tablets (25 mg) by mouth every 7 days . Take the same day of the week. 40 tablet 5    naltrexone (DEPADE/REVIA) 50 MG tablet Take 0.5 tablets (25 mg) by mouth daily. 30 tablet 3    omeprazole (PRILOSEC) 10 MG DR capsule Take 20 mg by mouth daily      prochlorperazine (COMPAZINE) 10 MG tablet Take 10 mg by mouth.      pseudoePHEDrine (SUDAFED) 30 MG tablet Take 30 mg by mouth      tazarotene (TAZORAC) 0.1 % external cream Apply to areas of thick scaling on hands and feet daily 60 g 3    triamcinolone (KENALOG) 0.1 % external cream APPLY TO AFFECTED AREA 2 TIMES A DAY FOR 10 DAYS TO ARMS, CHEST, NECK, AND BACK       Current Facility-Administered Medications   Medication Dose Route Frequency Provider Last Rate Last Admin    - medication instruction - If heparin contraindicated, use anticoagulant citrate dextrose formula A for prime and procedure.    Does not apply During Apheresis (from stock) Radha May MD        methoxsalen (photopheresis) SOLN   Apheresis DOES NOT GO TO Radha Perez MD                  Exam:     Vitals:    02/10/25 1245 02/10/25 1500   BP: 116/72 99/55   Pulse: 57 52   Resp: 16 16   Temp: 97.7  F (36.5  C) 97.9  F (36.6  C)   TempSrc: Oral Oral       Alert, no apparent distress  Breathing appears comfortable  Erythema of skin, some scaling  Moves all extremeties  PIV          Data:       CBC  Recent Labs   Lab  02/10/25  1302   WBC 5.5   RBC 5.08   HGB 14.1   HCT 42.7   MCV 84   MCH 27.8   MCHC 33.0   RDW 16.4*                 Procedure Summary:   Extracorporeal photopheresis (ECP) was performed using a Cellex device.  The vascular access was peripheral IV.  The circuit was primed ACD-A and then ACD-A was used for anticoagulation during the procedure.  The collected white blood cells were treated with Uvadex (methoxsalen) and then exposed to UVA irradiation in the device.  The treated cells were returned to the patient. Vital signs were stable. The patient tolerated the procedure well.     Attestation: During the procedure the patient was directly seen and evaluated by me, Wilner Carson MD.  I have reviewed the chart and pertinent laboratory findings, and I have discussed the patient and the current procedure with the Apheresis nursing staff.    Wilner Carson MD  Transfusion Medicine Attending  Laboratory Medicine & Pathology

## 2025-02-11 ENCOUNTER — HOSPITAL ENCOUNTER (OUTPATIENT)
Dept: LAB | Facility: CLINIC | Age: 65
Discharge: HOME | End: 2025-02-11
Attending: INTERNAL MEDICINE | Admitting: PATHOLOGY
Payer: COMMERCIAL

## 2025-02-11 VITALS
TEMPERATURE: 98.5 F | SYSTOLIC BLOOD PRESSURE: 97 MMHG | DIASTOLIC BLOOD PRESSURE: 56 MMHG | HEART RATE: 56 BPM | RESPIRATION RATE: 18 BRPM

## 2025-02-11 PROCEDURE — 250N000009 HC RX 250

## 2025-02-11 PROCEDURE — 36522 PHOTOPHERESIS: CPT

## 2025-02-11 RX ADMIN — ANTICOAGULANT CITRATE DEXTROSE SOLUTION FORMULA A 213 ML: 12.25; 11; 3.65 SOLUTION INTRAVENOUS at 08:32

## 2025-02-11 RX ADMIN — Medication: at 08:00

## 2025-02-11 NOTE — DISCHARGE INSTRUCTIONS
Apheresis Blood Donor Center Post Instructions  You may feel tired after your procedure today.   Please call your doctor if you have:  bleeding that doesn t stop, fever, pain where a needle or tube (catheter) was placed, seizures, trouble breathing, red urine, nausea or vomiting, other health concerns.     If your symptoms are severe, call 911.  Your vein was used, keep the bandages on for 2-4 hours.  Avoid heavy lifting with your arms.  If bleeding occurs from these sites, apply firm pressure for 5-10 minutes.  Call your physician if bleeding continues.    The Apheresis/Blood Donor Center is open Monday-Friday 7:30 a.m. to 5 p.m.  The phone number is [phone].    Photopheresis:  Avoid sunlight , and wear UVA-protective, full coverage sunglasses and sunscreen SPF 15 or higher  for 24 hours after your treatment.  The drug used in your treatment makes patients more sensitive to sunlight for about 24 hours after the treatment.

## 2025-02-11 NOTE — PROCEDURES
Laboratory Medicine and Pathology  Transfusion Medicine - Apheresis Procedure    Blane Ugalde MRN# [id number]   YOB: 1960 Age: 64 year old        Reason for consult: Mycosis fungoides/Sezary syndrome           Assessment and Plan:   The patient is a 64 year old male with mycosis fungoides/Sezary syndrome. He is undergoing extracorporeal photopheresis this AM and is tolerating the procedure well. No concerns/complaints.  He did well over night.  As noted yesterday, symptoms have been stable and much improved since starting ECP.  Continue with plan for monthly series.         Chief Complaint:   Dry, irritated skin         History of Present Illness:   The patient is a 63 year old male with mycosis fungoides/Sezary syndrome. He has had symptoms for many years. He was first diagnosed in January 2024. He is currently receiving mogamulizumab and ECP and using topical creams. He has had notable improvement in his skin, and pruritus is tolerable.          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          Immunizations:   Has previously received a COVID19 vaccine  Most Recent Immunizations   Administered Date(s) Administered    COVID-19 Monovalent 18+ (Moderna) 04/13/2021    Zoster recombinant adjuvanted (SHINGRIX) 03/15/2024           Allergies:   No Known Allergies          Medications:     Current Outpatient Medications   Medication Sig Dispense Refill    acetaminophen (TYLENOL) 325 MG tablet Take 650 mg by mouth      cetirizine (ZYRTEC) 10 MG tablet Take 10 mg by mouth      clobetasol (TEMOVATE) 0.05 % external ointment Apply topically 2 times daily. Apply thin layer to rash on the legs twice daily 60  g 4    clobetasol (TEMOVATE) 0.05 % external ointment Apply topically 2 times daily 60 g 3    clobetasol propionate (CLOBEX) 0.05 % external shampoo       ferrous sulfate dried 160 (50 Fe) MG tablet Take 1 tablet by mouth daily (with breakfast). 65 mg      folic acid (FOLVITE) 1 MG tablet Take 1 tablet (1 mg) by mouth daily , except on days that you take methotrexate (Patient not taking: Reported on 1/14/2025) 26 tablet 11    hydrOXYzine HCl (ATARAX) 25 MG tablet Take 1-2 tablets (25-50 mg) by mouth 3 times daily as needed for itching 60 tablet 2    methotrexate 2.5 MG tablet Take 10 tablets (25 mg) by mouth every 7 days . Take the same day of the week. 40 tablet 5    naltrexone (DEPADE/REVIA) 50 MG tablet Take 0.5 tablets (25 mg) by mouth daily. 30 tablet 3    omeprazole (PRILOSEC) 10 MG DR capsule Take 20 mg by mouth daily      prochlorperazine (COMPAZINE) 10 MG tablet Take 10 mg by mouth.      pseudoePHEDrine (SUDAFED) 30 MG tablet Take 30 mg by mouth      tazarotene (TAZORAC) 0.1 % external cream Apply to areas of thick scaling on hands and feet daily 60 g 3    triamcinolone (KENALOG) 0.1 % external cream APPLY TO AFFECTED AREA 2 TIMES A DAY FOR 10 DAYS TO ARMS, CHEST, NECK, AND BACK       No current facility-administered medications for this encounter.              Exam:     Vitals:    02/11/25 0817   BP: 108/72   Pulse: 57   Resp: 18   Temp: 98.6  F (37  C)   TempSrc: Oral       Alert, no apparent distress  Breathing appears comfortable  Erythema of skin, some scaling  Moves all extremeties  PIV          Data:       CBC  Recent Labs   Lab 02/10/25  1302   WBC 5.5   RBC 5.08   HGB 14.1   HCT 42.7   MCV 84   MCH 27.8   MCHC 33.0   RDW 16.4*                 Procedure Summary:   Extracorporeal photopheresis (ECP) is performed using a Cellex device.  The vascular access is peripheral IV.  The circuit was primed with ACD-A and then ACD-A is used for anticoagulation during the procedure.  The collected white  blood cells are treated with Uvadex (methoxsalen) and then exposed to UVA irradiation in the device.  The treated cells are returned to the patient. Vital signs have been stable. The patient is tolerating the procedure well.     Attestation: During the procedure the patient was directly seen and evaluated by me, Wilner Carson MD.  I have reviewed the chart and pertinent laboratory findings, and I have discussed the patient and the current procedure with the Apheresis nursing staff.    Wilner Carson MD  Transfusion Medicine Attending  Laboratory Medicine & Pathology

## 2025-03-03 RX ORDER — CALCIUM CARBONATE 500 MG/1
500 TABLET, CHEWABLE ORAL
Status: CANCELLED | OUTPATIENT
Start: 2025-03-03

## 2025-03-05 ENCOUNTER — HOSPITAL ENCOUNTER (OUTPATIENT)
Dept: LAB | Facility: CLINIC | Age: 65
Discharge: HOME | End: 2025-03-05
Attending: INTERNAL MEDICINE | Admitting: INTERNAL MEDICINE
Payer: COMMERCIAL

## 2025-03-05 VITALS
SYSTOLIC BLOOD PRESSURE: 95 MMHG | BODY MASS INDEX: 26.62 KG/M2 | DIASTOLIC BLOOD PRESSURE: 57 MMHG | WEIGHT: 164.9 LBS | HEART RATE: 60 BPM | TEMPERATURE: 98.1 F | RESPIRATION RATE: 18 BRPM

## 2025-03-05 LAB
BASOPHILS # BLD AUTO: 0.1 10E3/UL (ref 0–0.2)
BASOPHILS NFR BLD AUTO: 1 %
EOSINOPHIL # BLD AUTO: 0.1 10E3/UL (ref 0–0.7)
EOSINOPHIL NFR BLD AUTO: 2 %
ERYTHROCYTE [DISTWIDTH] IN BLOOD BY AUTOMATED COUNT: 15.3 % (ref 10–15)
HCT VFR BLD AUTO: 43.8 % (ref 40–53)
HGB BLD-MCNC: 14.6 G/DL (ref 13.3–17.7)
IMM GRANULOCYTES # BLD: 0 10E3/UL
IMM GRANULOCYTES NFR BLD: 0 %
LYMPHOCYTES # BLD AUTO: 0.2 10E3/UL (ref 0.8–5.3)
LYMPHOCYTES NFR BLD AUTO: 4 %
MCH RBC QN AUTO: 28.5 PG (ref 26.5–33)
MCHC RBC AUTO-ENTMCNC: 33.3 G/DL (ref 31.5–36.5)
MCV RBC AUTO: 85 FL (ref 78–100)
MONOCYTES # BLD AUTO: 0.5 10E3/UL (ref 0–1.3)
MONOCYTES NFR BLD AUTO: 10 %
NEUTROPHILS # BLD AUTO: 3.9 10E3/UL (ref 1.6–8.3)
NEUTROPHILS NFR BLD AUTO: 82 %
NRBC # BLD AUTO: 0 10E3/UL
NRBC BLD AUTO-RTO: 0 /100
PLATELET # BLD AUTO: 222 10E3/UL (ref 150–450)
RBC # BLD AUTO: 5.13 10E6/UL (ref 4.4–5.9)
WBC # BLD AUTO: 4.7 10E3/UL (ref 4–11)

## 2025-03-05 PROCEDURE — 85025 COMPLETE CBC W/AUTO DIFF WBC: CPT

## 2025-03-05 PROCEDURE — 250N000009 HC RX 250

## 2025-03-05 PROCEDURE — 36522 PHOTOPHERESIS: CPT

## 2025-03-05 PROCEDURE — 36415 COLL VENOUS BLD VENIPUNCTURE: CPT

## 2025-03-05 RX ADMIN — ANTICOAGULANT CITRATE DEXTROSE SOLUTION FORMULA A 215 ML: 12.25; 11; 3.65 SOLUTION INTRAVENOUS at 08:30

## 2025-03-05 NOTE — PROCEDURES
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   He tolerated it well and is on our schedule for tomorrow             History of Present Illness:   Blane Ugalde is a 64 year old male with past medical history of  hyperlipidemia and a prothrombin gene mutation. He has been followed for rashes and cutaneous symptoms since 2011, but has not had a definitive diagnosis and staging for CTCL or Sézary until a skin biopsy, blood and bone marrow work-up in early 2024 . His first ECP was in March of 2024 ECP and  currently has 2 procedures a month on consecutive days         Past Medical History:   No past medical history on file.          Past Surgical History:     Past Surgical History:   Procedure Laterality Date    APPENDECTOMY      IR LYMPH NODE BIOPSY  7/2/2024              Social History:     Social History     Tobacco Use    Smoking status: Never    Smokeless tobacco: Never   Substance Use Topics    Alcohol use: Not on file            Allergies:   No Known Allergies            Medications:     Current Outpatient Medications   Medication Sig Dispense Refill    acetaminophen (TYLENOL) 325 MG tablet Take 650 mg by mouth      cetirizine (ZYRTEC) 10 MG tablet Take 10 mg by mouth      clobetasol (TEMOVATE) 0.05 % external ointment Apply topically 2 times daily. Apply thin layer to rash on the legs twice daily 60 g 4    clobetasol (TEMOVATE) 0.05 % external ointment Apply topically 2 times daily 60 g 3    clobetasol propionate (CLOBEX) 0.05 % external shampoo       ferrous sulfate  dried 160 (50 Fe) MG tablet Take 1 tablet by mouth daily (with breakfast). 65 mg      hydrOXYzine HCl (ATARAX) 25 MG tablet Take 1-2 tablets (25-50 mg) by mouth 3 times daily as needed for itching. 60 tablet 2    naltrexone (DEPADE/REVIA) 50 MG tablet Take 0.5 tablets (25 mg) by mouth daily. 30 tablet 3    omeprazole (PRILOSEC) 10 MG DR capsule Take 20 mg by mouth daily      pseudoePHEDrine (SUDAFED) 30 MG tablet Take 30 mg by mouth      tazarotene (TAZORAC) 0.1 % external cream Apply to areas of thick scaling on hands and feet daily 60 g 3    triamcinolone (KENALOG) 0.1 % external cream APPLY TO AFFECTED AREA 2 TIMES A DAY FOR 10 DAYS TO ARMS, CHEST, NECK, AND BACK      folic acid (FOLVITE) 1 MG tablet Take 1 tablet (1 mg) by mouth daily , except on days that you take methotrexate 26 tablet 11    methotrexate 2.5 MG tablet Take 10 tablets (25 mg) by mouth every 7 days . Take the same day of the week. 40 tablet 5    prochlorperazine (COMPAZINE) 10 MG tablet Take 10 mg by mouth.              Abbreviated Physical Exam:   BP 95/57   Pulse 60   Temp 98.1  F (36.7  C) (Oral)   Resp 18   Wt 74.8 kg (164 lb 14.5 oz)   BMI 26.62 kg/m    Patient Alert & Oriented and in No Acute Distress         Laboratory Data:     BMPNo lab results found in last 7 days.  CBC  Recent Labs   Lab 03/05/25  0828   WBC 4.7   RBC 5.13   HGB 14.6   HCT 43.8   MCV 85   MCH 28.5   MCHC 33.3   RDW 15.3*                 Procedure Summary:     Extracorporeal photopheresis (ECP) was performed using a Cellex device. The vascular access was peripheral IV. The circuit was primed ACD-A and then ACD-A was used for anticoagulation during the procedure. The collected white blood cells were treated with UVADEX (methoxsalen) and then exposed to UVA irradiation in the device. The treated cells were returned to the patient. Vital signs were stable. The patient tolerated the procedure well.     Attestation:   During the procedure the patient was  directly seen and evaluated by me, Wilner Cifuentes MD.  I have reviewed the chart and pertinent laboratory findings, and discussed the patient and the current procedure with the Apheresis nursing staff.   Wilner Cifuentes MD  Transfusion Medicine Attending  Division of Transfusion Medicine   Department of Laboratory Medicine & [place] [address]   Pager: [phone]

## 2025-03-05 NOTE — DISCHARGE INSTRUCTIONS
Apheresis Blood Donor Center Post Instructions  You may feel tired after your procedure today.   Please call your doctor if you have:  bleeding that doesn t stop, fever, pain where a needle or tube (catheter) was placed, seizures, trouble breathing, red urine, nausea or vomiting, other health concerns.     If your symptoms are severe, call 911.  If your veins were used, keep the bandages on for 2-4 hours.  Avoid heavy lifting with your arms.  If bleeding occurs from these sites, apply firm pressure for 5-10 minutes.  Call your physician if bleeding continues.    The Apheresis/Blood Donor Center is open Monday-Friday 7:30 a.m. to 5 p.m.  The phone number is [phone].  A Transfusion Medicine physician can be reached after 5:00 p.m. weekdays and on weekends /Holidays by calling [phone], and asking for the physician on call.      Photopheresis:  Avoid sunlight , and wear UVA-protective, full coverage sunglasses and sunscreen SPF 15 or higher  for 24 hours after your treatment.  The drug used in your treatment makes patients more sensitive to sunlight for about 24 hours after the treatment.

## 2025-03-16 ENCOUNTER — HEALTH MAINTENANCE LETTER (OUTPATIENT)
Age: 65
End: 2025-03-16

## 2025-04-02 RX ORDER — CALCIUM CARBONATE 500 MG/1
500 TABLET, CHEWABLE ORAL
Status: CANCELLED | OUTPATIENT
Start: 2025-04-02

## 2025-04-03 ENCOUNTER — HOSPITAL ENCOUNTER (OUTPATIENT)
Dept: LAB | Facility: CLINIC | Age: 65
Discharge: HOME | End: 2025-04-03
Attending: STUDENT IN AN ORGANIZED HEALTH CARE EDUCATION/TRAINING PROGRAM
Payer: COMMERCIAL

## 2025-04-03 VITALS
TEMPERATURE: 98.4 F | RESPIRATION RATE: 16 BRPM | HEART RATE: 66 BPM | SYSTOLIC BLOOD PRESSURE: 98 MMHG | DIASTOLIC BLOOD PRESSURE: 51 MMHG

## 2025-04-03 LAB
BASOPHILS # BLD AUTO: 0.1 10E3/UL (ref 0–0.2)
BASOPHILS NFR BLD AUTO: 1 %
EOSINOPHIL # BLD AUTO: 0 10E3/UL (ref 0–0.7)
EOSINOPHIL NFR BLD AUTO: 1 %
ERYTHROCYTE [DISTWIDTH] IN BLOOD BY AUTOMATED COUNT: 14.4 % (ref 10–15)
HCT VFR BLD AUTO: 43.1 % (ref 40–53)
HGB BLD-MCNC: 14.7 G/DL (ref 13.3–17.7)
IMM GRANULOCYTES # BLD: 0 10E3/UL
IMM GRANULOCYTES NFR BLD: 0 %
LYMPHOCYTES # BLD AUTO: 0.4 10E3/UL (ref 0.8–5.3)
LYMPHOCYTES NFR BLD AUTO: 8 %
MCH RBC QN AUTO: 29.5 PG (ref 26.5–33)
MCHC RBC AUTO-ENTMCNC: 34.1 G/DL (ref 31.5–36.5)
MCV RBC AUTO: 87 FL (ref 78–100)
MONOCYTES # BLD AUTO: 0.6 10E3/UL (ref 0–1.3)
MONOCYTES NFR BLD AUTO: 11 %
NEUTROPHILS # BLD AUTO: 4.3 10E3/UL (ref 1.6–8.3)
NEUTROPHILS NFR BLD AUTO: 80 %
NRBC # BLD AUTO: 0 10E3/UL
NRBC BLD AUTO-RTO: 0 /100
PLATELET # BLD AUTO: 201 10E3/UL (ref 150–450)
RBC # BLD AUTO: 4.98 10E6/UL (ref 4.4–5.9)
WBC # BLD AUTO: 5.4 10E3/UL (ref 4–11)

## 2025-04-03 PROCEDURE — 85025 COMPLETE CBC W/AUTO DIFF WBC: CPT | Performed by: PATHOLOGY

## 2025-04-03 PROCEDURE — 36522 PHOTOPHERESIS: CPT

## 2025-04-03 PROCEDURE — 250N000009 HC RX 250: Performed by: PATHOLOGY

## 2025-04-03 PROCEDURE — 36415 COLL VENOUS BLD VENIPUNCTURE: CPT | Performed by: PATHOLOGY

## 2025-04-03 RX ORDER — CALCIUM CARBONATE 500 MG/1
500 TABLET, CHEWABLE ORAL
OUTPATIENT
Start: 2025-04-03

## 2025-04-03 RX ADMIN — ANTICOAGULANT CITRATE DEXTROSE SOLUTION FORMULA A 212 ML: 12.25; 11; 3.65 SOLUTION INTRAVENOUS at 12:33

## 2025-04-03 NOTE — DISCHARGE INSTRUCTIONS
Apheresis Blood Donor Center Post Instructions  You may feel tired after your procedure today.   Please call your doctor if you have:  bleeding that doesn t stop, fever, pain where a needle or tube (catheter) was placed, seizures, trouble breathing, red urine, nausea or vomiting, other health concerns.     If your symptoms are severe, call 911.  If your veins were used, keep the bandages on for 2-4 hours.  Avoid heavy lifting with your arms.  If bleeding occurs from these sites, apply firm pressure for 5-10 minutes.  Call your physician if bleeding continues.    If you have a Central Venous Catheter:  Notify your doctor if you have had a fever, chills, shaking  or redness, warmth, swelling, drainage at the exit-site.  This could be a sign of infection.    If we used your fistula or graft, watch for signs of bleeding.  Please remove the bandages after 4 hours.  The Apheresis/Blood Donor Center is open Monday-Friday 7:30 a.m. to 5 p.m.  The phone number is [phone].  A Transfusion Medicine physician can be reached after 5:00 p.m. weekdays and on weekends /Holidays by calling [phone], and asking for the physician on call.      Photopheresis:  Avoid sunlight , and wear UVA-protective, full coverage sunglasses and sunscreen SPF 15 or higher  for 24 hours after your treatment.  The drug used in your treatment makes patients more sensitive to sunlight for about 24 hours after the treatment.

## 2025-04-03 NOTE — PROCEDURES
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   He tolerated it well and is on our schedule for tomorrow.  Notes feeling well today, denies nausea, vomiting, fevers, chills.              History of Present Illness:   Blane Ugalde is a 64 year old male with past medical history of  hyperlipidemia and a prothrombin gene mutation. He has been followed for rashes and cutaneous symptoms since 2011, but has not had a definitive diagnosis and staging for CTCL or Sézary until a skin biopsy, blood and bone marrow work-up in early 2024 . His first ECP was in March of 2024 ECP and  currently has 2 procedures a month on consecutive days         Past Medical History:   No past medical history on file.  CTCL/ Sezary          Past Surgical History:     Past Surgical History:   Procedure Laterality Date    APPENDECTOMY      IR LYMPH NODE BIOPSY  7/2/2024                  Allergies:   No Known Allergies            Medications:     Current Outpatient Medications   Medication Sig Dispense Refill    acetaminophen (TYLENOL) 325 MG tablet Take 650 mg by mouth      clobetasol (TEMOVATE) 0.05 % external ointment Apply topically 2 times daily. Apply thin layer to rash on the legs twice daily 60 g 4    clobetasol (TEMOVATE) 0.05 % external ointment Apply topically 2 times daily 60 g 3    clobetasol propionate (CLOBEX) 0.05 % external shampoo       ferrous sulfate dried 160 (50 Fe) MG tablet Take 1 tablet by mouth daily (with breakfast). 65 mg      folic acid (FOLVITE) 1 MG tablet Take 1 tablet (1 mg) by mouth daily , except  on days that you take methotrexate 26 tablet 11    hydrOXYzine HCl (ATARAX) 25 MG tablet Take 1-2 tablets (25-50 mg) by mouth 3 times daily as needed for itching. 60 tablet 2    methotrexate 2.5 MG tablet Take 10 tablets (25 mg) by mouth every 7 days . Take the same day of the week. 40 tablet 5    naltrexone (DEPADE/REVIA) 50 MG tablet Take 0.5 tablets (25 mg) by mouth daily. 30 tablet 3    omeprazole (PRILOSEC) 10 MG DR capsule Take 20 mg by mouth daily      prochlorperazine (COMPAZINE) 10 MG tablet Take 10 mg by mouth.      pseudoePHEDrine (SUDAFED) 30 MG tablet Take 30 mg by mouth      tazarotene (TAZORAC) 0.1 % external cream Apply to areas of thick scaling on hands and feet daily 60 g 3    triamcinolone (KENALOG) 0.1 % external cream APPLY TO AFFECTED AREA 2 TIMES A DAY FOR 10 DAYS TO ARMS, CHEST, NECK, AND BACK              Abbreviated Physical Exam:   /69   Pulse 71   Temp 98.5  F (36.9  C) (Oral)   Resp 16   Alert and in No Acute Distress  Breathing appears comfortable on room air  Peripheral IV access for the procedure.          Laboratory Data:     CBC  Recent Labs   Lab 04/03/25  1247   WBC 5.4   RBC 4.98   HGB 14.7   HCT 43.1   MCV 87   MCH 29.5   MCHC 34.1   RDW 14.4        Results for orders placed or performed during the hospital encounter of 04/03/25   CBC with platelets and differential     Status: Abnormal   Result Value Ref Range    WBC Count 5.4 4.0 - 11.0 10e3/uL    RBC Count 4.98 4.40 - 5.90 10e6/uL    Hemoglobin 14.7 13.3 - 17.7 g/dL    Hematocrit 43.1 40.0 - 53.0 %    MCV 87 78 - 100 fL    MCH 29.5 26.5 - 33.0 pg    MCHC 34.1 31.5 - 36.5 g/dL    RDW 14.4 10.0 - 15.0 %    Platelet Count 201 150 - 450 10e3/uL    % Neutrophils 80 %    % Lymphocytes 8 %    % Monocytes 11 %    % Eosinophils 1 %    % Basophils 1 %    % Immature Granulocytes 0 %    NRBCs per 100 WBC 0 <1 /100    Absolute Neutrophils 4.3 1.6 - 8.3 10e3/uL    Absolute Lymphocytes 0.4 (L) 0.8 - 5.3 10e3/uL    Absolute  Monocytes 0.6 0.0 - 1.3 10e3/uL    Absolute Eosinophils 0.0 0.0 - 0.7 10e3/uL    Absolute Basophils 0.1 0.0 - 0.2 10e3/uL    Absolute Immature Granulocytes 0.0 <=0.4 10e3/uL    Absolute NRBCs 0.0 10e3/uL   CBC with platelets differential     Status: Abnormal    Narrative    The following orders were created for panel order CBC with platelets differential.  Procedure                               Abnormality         Status                     ---------                               -----------         ------                     CBC with platelets and ...[[id number]]  Abnormal            Final result                 Please view results for these tests on the individual orders.              Procedure Summary:     Extracorporeal photopheresis (ECP) was performed using a Cellex device. The vascular access was peripheral IV. The circuit was primed ACD-A and then ACD-A was used for anticoagulation during the procedure. The collected white blood cells were treated with UVADEX (methoxsalen) and then exposed to UVA irradiation in the device. The treated cells were returned to the patient. Vital signs were stable. The patient tolerated the procedure well.  See apheresis flowsheet for additional details.      Attestation: During the procedure the patient was directly seen and evaluated by me, Tremayne Gillis MD.  The medical student, Edelmira Lange MS3, was also present for the evaluation.  I have reviewed the chart and pertinent laboratory findings, and discussed the patient and the current procedure with the Apheresis nursing staff.    Tremayne Gillis MD  Transfusion Medicine Attending  Laboratory Medicine and Pathology

## 2025-04-04 ENCOUNTER — HOSPITAL ENCOUNTER (OUTPATIENT)
Dept: LAB | Facility: CLINIC | Age: 65
Discharge: HOME | End: 2025-04-04
Attending: STUDENT IN AN ORGANIZED HEALTH CARE EDUCATION/TRAINING PROGRAM | Admitting: STUDENT IN AN ORGANIZED HEALTH CARE EDUCATION/TRAINING PROGRAM
Payer: COMMERCIAL

## 2025-04-04 VITALS
RESPIRATION RATE: 16 BRPM | SYSTOLIC BLOOD PRESSURE: 99 MMHG | HEART RATE: 70 BPM | WEIGHT: 164.9 LBS | TEMPERATURE: 98.5 F | DIASTOLIC BLOOD PRESSURE: 62 MMHG | BODY MASS INDEX: 26.62 KG/M2

## 2025-04-04 PROCEDURE — 250N000009 HC RX 250: Performed by: PATHOLOGY

## 2025-04-04 PROCEDURE — 36522 PHOTOPHERESIS: CPT

## 2025-04-04 RX ADMIN — ANTICOAGULANT CITRATE DEXTROSE SOLUTION FORMULA A 211 ML: 12.25; 11; 3.65 SOLUTION INTRAVENOUS at 08:20

## 2025-04-04 NOTE — PROCEDURES
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   He tolerated it well. No issues raised today.  Resting for much of the procedure today.               History of Present Illness:   Blane Ugalde is a 64 year old male with past medical history of  hyperlipidemia and a prothrombin gene mutation. He has been followed for rashes and cutaneous symptoms since 2011, but has not had a definitive diagnosis and staging for CTCL or Sézary until a skin biopsy, blood and bone marrow work-up in early 2024 . His first ECP was in March of 2024 ECP and  currently has 2 procedures a month on consecutive days         Past Medical History:   No past medical history on file.  CTCL/ Sezary          Past Surgical History:     Past Surgical History:   Procedure Laterality Date    APPENDECTOMY      IR LYMPH NODE BIOPSY  7/2/2024                  Allergies:   No Known Allergies            Medications:     Current Outpatient Medications   Medication Sig Dispense Refill    acetaminophen (TYLENOL) 325 MG tablet Take 650 mg by mouth      clobetasol (TEMOVATE) 0.05 % external ointment Apply topically 2 times daily. Apply thin layer to rash on the legs twice daily 60 g 4    clobetasol (TEMOVATE) 0.05 % external ointment Apply topically 2 times daily 60 g 3    clobetasol propionate (CLOBEX) 0.05 % external shampoo       ferrous sulfate dried 160 (50 Fe) MG tablet Take 1 tablet by mouth daily (with breakfast). 65 mg      hydrOXYzine HCl (ATARAX) 25 MG tablet Take 1-2 tablets (25-50 mg) by mouth 3 times daily as needed for itching. 60  tablet 2    naltrexone (DEPADE/REVIA) 50 MG tablet Take 0.5 tablets (25 mg) by mouth daily. 30 tablet 3    omeprazole (PRILOSEC) 10 MG DR capsule Take 20 mg by mouth daily      pseudoePHEDrine (SUDAFED) 30 MG tablet Take 30 mg by mouth      tazarotene (TAZORAC) 0.1 % external cream Apply to areas of thick scaling on hands and feet daily 60 g 3    triamcinolone (KENALOG) 0.1 % external cream APPLY TO AFFECTED AREA 2 TIMES A DAY FOR 10 DAYS TO ARMS, CHEST, NECK, AND BACK      folic acid (FOLVITE) 1 MG tablet Take 1 tablet (1 mg) by mouth daily , except on days that you take methotrexate 26 tablet 11    methotrexate 2.5 MG tablet Take 10 tablets (25 mg) by mouth every 7 days . Take the same day of the week. 40 tablet 5    prochlorperazine (COMPAZINE) 10 MG tablet Take 10 mg by mouth.              Abbreviated Physical Exam:   BP 99/62   Pulse 70   Temp 98.5  F (36.9  C) (Oral)   Resp 16   Wt 74.8 kg (164 lb 14.5 oz)   BMI 26.62 kg/m    Resting, No apparent distress  Breathing appears comfortable on room air  Peripheral IV access for the procedure.          Laboratory Data:     CBC  Recent Labs   Lab 04/03/25  1247   WBC 5.4   RBC 4.98   HGB 14.7   HCT 43.1   MCV 87   MCH 29.5   MCHC 34.1   RDW 14.4        No results found for any visits on 04/04/25.             Procedure Summary:     Extracorporeal photopheresis (ECP) was performed using a Cellex device. The vascular access was peripheral IV. The circuit was primed ACD-A and then ACD-A was used for anticoagulation during the procedure. The collected white blood cells were treated with UVADEX (methoxsalen) and then exposed to UVA irradiation in the device. The treated cells were returned to the patient. Vital signs were stable. The patient tolerated the procedure well.  See apheresis flowsheet for additional details.          Attestation: During the procedure the patient was directly seen and evaluated by me, Tremayne Gillis MD.  The medical student,  Edelmira Lange MS3, was also present for the evaluation.  I have reviewed the chart and pertinent laboratory findings, and discussed the patient and the current procedure with the Apheresis nursing staff.    Tremayne Gillis MD  Transfusion Medicine Attending  Laboratory Medicine and Pathology

## 2025-04-21 NOTE — PROGRESS NOTES
Virtual Visit Details    Type of service:  Video Visit   Video Start Time: 12:25 PM  Video End Time:12:55 PM    Originating Location (pt. Location): Home    Distant Location (provider location):  On-site  Platform used for Video Visit: Pauline        REASON FOR VISIT:  Management of cutaneous T cell lymphoma most consistent with mycosis fungoides (MF) vs Sézary Syndrome (SS)    HISTORY OF PRESENT ILLNESS:  Blane Ugalde is a 64 year old with 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  To summarize his course, he had a skin rash involving the arms that waxed an waned since about 2011.  It progressed to involve his torso.  Rash was raised, red/purple in color, and intermittently itched.  There was limited benefit from topical creams.  Skin biopsy in early 2021 suggested interface dermatitis.  Skin biopsy in 07/2021 had atypical lymphoid infiltrates but with no specific histologic diagnosis.  Skin biopsy in 01/2024 was felt to be most consistent with MF.  Bone marrow biopsy in 02/2024 had no obvious bone marrow involvement but peripheral blood flow cytometry showed about 0.5 x 10^9/L clonal T cells (Sezary cells).  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  He had received NBUVB treatment in the past with some improvement in itching and some relief from topical steroids but not durable.  Connected with our Derm Lymphoma Team in 02/2024 and started ECP and weekly low-dose methotrexate.      He had a CT scan on 4/26/2024 that showed prominent axillary lymphadenopathy on the right greater than left and slightly enlarged bilateral inguinal lymphadenopathy, and core needle biopsy of a left axillary lymph node in 07/2024 was consistent with involvement by MF with minimal CD30 expression and no evidence of large cell transformation.  He had a visit at [facility] for another opinion in 07/2024, workup including  peripheral blood flow cytometry included about 1.1 x 10^9/L clonal T cells - overall consistent with clinical stage IVA2 SS, and they recommended treatment with mogamulizumab.  He started mogamulizumab in 08/2024, complicated by flare of rash early on that subsided.  He developed progressive skin lesions in 03-04/2025 and mogamulizumab held.  CT showed modest progression of cervical, axillary, and inguinal lymphadenopathy.  Skin biopsies from the scalp were most consistent with progressive mycosis fungoides (do not mention large cell transformation) and CD30 IHC is in process.  Symptoms improved with a 5 day course of prednisone.  [place] recommended treatment with brentuximab vedotin if CD30-positive or azacitidine and romidepsin if [id number].      Visit for ongoing management of MF vs SS.    He is with wife Irma but not son Sterling.  Skin on scalp, forehead, and mouth got worse around 03/2025 and mogamulizumab was stopped.  Not much on the rest of skin.  No fevers, frequent night sweats, or unintentional weight loss.  Normal bowel function.  No bleeding or unusual bruising.  He continues work but having trouble getting through a full day.  Interested in discussing management options.    PAST MEDICAL HISTORY:  Prothrombin gene mutation, hyperlipidemia    MEDICATIONS:  Current Outpatient Medications   Medication Sig Dispense Refill    acetaminophen (TYLENOL) 325 MG tablet Take 650 mg by mouth      clobetasol (TEMOVATE) 0.05 % external ointment Apply topically 2 times daily. Apply thin layer to rash on the legs twice daily 60 g 4    clobetasol (TEMOVATE) 0.05 % external ointment Apply topically 2 times daily 60 g 3    clobetasol propionate (CLOBEX) 0.05 % external shampoo       ferrous sulfate dried 160 (50 Fe) MG tablet Take 1 tablet by mouth daily (with breakfast). 65 mg      folic acid (FOLVITE) 1 MG tablet Take 1 tablet (1 mg) by mouth daily , except on days that you take methotrexate 26 tablet 11     "hydrOXYzine HCl (ATARAX) 25 MG tablet Take 1-2 tablets (25-50 mg) by mouth 3 times daily as needed for itching. 60 tablet 2    methotrexate 2.5 MG tablet Take 10 tablets (25 mg) by mouth every 7 days . Take the same day of the week. 40 tablet 5    naltrexone (DEPADE/REVIA) 50 MG tablet Take 0.5 tablets (25 mg) by mouth daily. 30 tablet 3    omeprazole (PRILOSEC) 10 MG DR capsule Take 20 mg by mouth daily      prochlorperazine (COMPAZINE) 10 MG tablet Take 10 mg by mouth.      pseudoePHEDrine (SUDAFED) 30 MG tablet Take 30 mg by mouth      tazarotene (TAZORAC) 0.1 % external cream Apply to areas of thick scaling on hands and feet daily 60 g 3    triamcinolone (KENALOG) 0.1 % external cream APPLY TO AFFECTED AREA 2 TIMES A DAY FOR 10 DAYS TO ARMS, CHEST, NECK, AND BACK       No current facility-administered medications for this visit.     SOCIAL HISTORY:  never smoker, , 3 kids, contractor, lives in [place], [place]    PHYSICAL EXAMINATION:  Ht 1.676 m (5' 6\")   Wt 72.6 kg (160 lb)   BMI 25.82 kg/m    Wt Readings from Last 5 Encounters:   04/22/25 72.6 kg (160 lb)   04/04/25 74.8 kg (164 lb 14.5 oz)   03/05/25 74.8 kg (164 lb 14.5 oz)   01/13/25 74.6 kg (164 lb 7.4 oz)   12/17/24 74.2 kg (163 lb 9.3 oz)     Skin: Erythema on face/forehead/scalp  HEENT: Sclerae anicteric.  Lungs: Breathing comfortably. No cough.  MSK: Grossly normal movement.  Neuro: Grossly non-focal.  Psych: Alert and oriented. No distress.  Performance status: ECOG 1    LABORATORY DATA: Reviewed by me  Labs reviewed in Care Everywhere  4/17/2025 WBC 6.94, Hb 15.6, platelet 240, creatinine 1, liver labs normal,     Recent Labs   Lab Test 04/03/25  1247 03/05/25  0828 02/10/25  1302 09/24/24  1248 08/26/24  1223 07/29/24  1311 07/01/24  1316   WBC 5.4 4.7 5.5   < > 6.5 5.8 4.6   HGB 14.7 14.6 14.1   < > 13.2* 13.6 14.2    222 207   < > 281 284 232   ANEU  --   --   --   --  4.9 3.7 3.4   ANEUTAUTO 4.3 3.9 4.6   < >  --   --   " --    ALYM  --   --   --   --  0.8 1.5 0.8   ALYMPAUTO 0.4* 0.2* 0.2*   < >  --   --   --     < > = values in this interval not displayed.     Recent Labs   Lab Test 01/13/25  1301 12/16/24  1225 11/18/24  1304 09/24/24  1248 08/26/24  1223 05/28/24  1249 05/01/24  1031    139 137   < > 137   < > 139   POTASSIUM 4.1 4.0 4.2   < > 4.1   < > 4.3   CHLORIDE 100 102 101   < > 102   < > 104   CO2 28 26 27   < > 25   < > 26   BUN 9.7 10.5 11.2   < > 11.9   < > 12.3   CR 0.88 0.90 0.83   < > 0.88   < > 0.95   TASHA 9.3 9.0 9.2   < > 9.1   < > 9.4   URIC  --   --   --   --   --   --  6.4   LDH  --   --  177  --  213  --  246    < > = values in this interval not displayed.     Recent Labs   Lab Test 01/13/25  1301 12/16/24  1225 11/18/24  1304 07/29/24  1311 07/02/24  1234   AST 17 21 21   < >  --    ALT 16 10 16   < >  --    ALKPHOS 90 95 79   < >  --    BILITOTAL 0.6 0.6 0.8   < >  --    INR  --   --   --   --  0.90    < > = values in this interval not displayed.     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    Recent course and workup including recent imaging and skin biopsies are consistent with progressive MF vs Sézary Syndrome that seems to have become refractory to mogamulizumab.  It would be helpful to have the recent biopsies reviewed here or at [place].  Overall, the workup remains consistent with clinical stage IVA2 MF vs Sézary Syndrome.  It would be helpful to repeat blood flow cytometry to evaluate the circulating Sézary cell burden and PET/CT may help identify if there are focal areas concerning for large cell transformation,  organ involvement, and provide a new baseline for treatment.  At this point changing therapy to try to improve lymphoma and symptom control makes sense.  We reviewed the goal of balancing the benefits of treatment with side effects, potential toxicity, and need for visits and supportive care to maximize the treatment benefit with as little impact on quality of life as possible.  Treatments short of alloBMT are not curative and sequential therapy with maximizing the duration and benefit from each line of therapy is the usual approach with years of disease control being realistic - but responses and outcomes vary widely.     The recent notes indicate that the team at [facility] has recommended brentuximab vedotin if IHC on the biopsies indicates CD30 expression or romidepsin with oral azacitidine if CD30 staining is negative.  I generally agree with this approach.  While there are some data and ongoing investigation regarding romidepsin combined with oral azacitidine for T cell lymphomas, the benefits for progressive MF/Sézary Syndrome vs sequential single agent therapy are less certain.  The team at [facility] may be able to provide more details regarding the up to date evidence regarding this combined approach vs single agent romidepsin which also would be a reasonable if [id number] and may have fewer side effects and require less supportive care.  He has a visit with [facility] next week and will discuss their recommendations in more detail.      Other agents including pralatrexate, liposomal doxorubicin, gemcitabine, combinations, or immune checkpoint therapy may be considered in the future.    I would continue adjunctive ECP and skin directed therapies.  Radiation to any sites of bothersome or threatening lymphoma is also reasonable.      There are no active ID issues.  He is up to date for Prevnar 20 and Shingrix vaccines.  I would recommend the RSV vaccine.  I have also recommended keeping up to date with the flu shot and  COVID-19 vaccine, although he has declined these.    He will continue to work with his primary care clinic for health maintenance and other medical issues.  We will keep them in the loop.    He will continue to work with Dr. Rush Johnson locally for lymphoma care, [facility] for treatment recommendations, and Dr. Ezra Rico from our Derm Lymphoma Team for skin directed therapy and ECP.  We will request a visit with me in about 4 months.  I reminded him to contact us if questions, concerns, or new issues come up between visits.    I spent 40 minutes on the date of encounter of which 30 minutes spent in medical discussion.    The longitudinal plan of care for the diagnosis(es)/condition(s) as documented were addressed during this visit. Due to the added complexity in care, I will continue to support Blane in the subsequent management and with ongoing continuity of care.    Roberto Everett MD, PhD  Attending Physician, [facility]  [phone] clinic    Addendum: Biopsy results from 4/10/2025 skin showed 10% CD30+ in part A and less than 5% CD30+ in part B. Based on expression >= 10% in one biopsy brentuximab vedotin is a reasonable next line of treatment. Discussed schedule up to 16 cycles every 3 weeks and possible side effects including fatigue, infection, low blood counts, organ damage, and neuropathy that needs to be balanced against lymphoma risks.

## 2025-04-22 ENCOUNTER — VIRTUAL VISIT (OUTPATIENT)
Dept: ONCOLOGY | Facility: CLINIC | Age: 65
End: 2025-04-22
Attending: INTERNAL MEDICINE
Payer: COMMERCIAL

## 2025-04-22 ENCOUNTER — TELEPHONE (OUTPATIENT)
Dept: ONCOLOGY | Facility: CLINIC | Age: 65
End: 2025-04-22
Payer: COMMERCIAL

## 2025-04-22 VITALS — BODY MASS INDEX: 25.71 KG/M2 | HEIGHT: 66 IN | WEIGHT: 160 LBS

## 2025-04-22 DIAGNOSIS — C84.18 SEZARY DISEASE INVOLVING LYMPH NODES OF MULTIPLE REGIONS (H): ICD-10-CM

## 2025-04-22 DIAGNOSIS — C84.04 MYCOSIS FUNGOIDES INVOLVING LYMPH NODES OF AXILLA (H): ICD-10-CM

## 2025-04-22 DIAGNOSIS — C84.09 MYCOSIS FUNGOIDES INVOLVING EXTRANODAL SITE EXCLUDING SPLEEN AND OTHER SOLID ORGANS (H): Primary | ICD-10-CM

## 2025-04-22 PROCEDURE — 1126F AMNT PAIN NOTED NONE PRSNT: CPT | Performed by: INTERNAL MEDICINE

## 2025-04-22 PROCEDURE — G2211 COMPLEX E/M VISIT ADD ON: HCPCS | Performed by: INTERNAL MEDICINE

## 2025-04-22 PROCEDURE — 98007 SYNCH AUDIO-VIDEO EST HI 40: CPT | Performed by: INTERNAL MEDICINE

## 2025-04-22 ASSESSMENT — PAIN SCALES - GENERAL: PAINLEVEL_OUTOF10: NO PAIN (0)

## 2025-04-22 NOTE — LETTER
4/22/2025      Blane Ugalde  [address] [facility] [address]      Dear Colleague,    Thank you for referring your patient, Blane Ugalde, to the [facility]. Please see a copy of my visit note below.    Virtual Visit Details    Type of service:  Video Visit   Video Start Time: 12:25 PM  Video End Time:12:55 PM    Originating Location (pt. 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  To summarize his course, he had a skin rash involving the arms that waxed an waned since about 2011.  It progressed to involve his torso.  Rash was raised, red/purple in color, and intermittently itched.  There was limited benefit from topical creams.  Skin biopsy in early 2021 suggested interface dermatitis.  Skin biopsy in 07/2021 had atypical lymphoid infiltrates but with no specific histologic diagnosis.  Skin biopsy in 01/2024 was felt to be most consistent with MF.  Bone marrow biopsy in 02/2024 had no obvious bone marrow involvement but peripheral blood flow cytometry showed about 0.5 x 10^9/L clonal T cells (Sezary cells).  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  He had received NBUVB treatment in the past with some improvement in itching and some relief from topical steroids but not durable.  Connected with our Derm Lymphoma Team in 02/2024 and started ECP and weekly low-dose methotrexate.      He had a CT scan on 4/26/2024 that showed prominent axillary lymphadenopathy on the right greater than left and slightly enlarged bilateral inguinal lymphadenopathy, and core  needle biopsy of a left axillary lymph node in 07/2024 was consistent with involvement by MF with minimal CD30 expression and no evidence of large cell transformation.  He had a visit at [facility] for another opinion in 07/2024, workup including peripheral blood flow cytometry included about 1.1 x 10^9/L clonal T cells - overall consistent with clinical stage IVA2 SS, and they recommended treatment with mogamulizumab.  He started mogamulizumab in 08/2024, complicated by flare of rash early on that subsided.  He developed progressive skin lesions in 03-04/2025 and mogamulizumab held.  CT showed modest progression of cervical, axillary, and inguinal lymphadenopathy.  Skin biopsies from the scalp were most consistent with progressive mycosis fungoides (do not mention large cell transformation) and CD30 IHC is in process.  Symptoms improved with a 5 day course of prednisone.  [facility] recommended treatment with brentuximab vedotin if CD30-positive or azacitidine and romidepsin if [id number].      Visit for ongoing management of MF vs SS.    He is with wife Irma but not son Sterling.  Skin on scalp, forehead, and mouth got worse around 03/2025 and mogamulizumab was stopped.  Not much on the rest of skin.  No fevers, frequent night sweats, or unintentional weight loss.  Normal bowel function.  No bleeding or unusual bruising.  He continues work but having trouble getting through a full day.  Interested in discussing management options.    PAST MEDICAL HISTORY:  Prothrombin gene mutation, hyperlipidemia    MEDICATIONS:  Current Outpatient Medications   Medication Sig Dispense Refill     acetaminophen (TYLENOL) 325 MG tablet Take 650 mg by mouth       clobetasol (TEMOVATE) 0.05 % external ointment Apply topically 2 times daily. Apply thin layer to rash on the legs twice daily 60 g 4     clobetasol (TEMOVATE) 0.05 % external ointment Apply topically 2 times daily 60 g 3     clobetasol propionate (CLOBEX) 0.05 % external shampoo     "    ferrous sulfate dried 160 (50 Fe) MG tablet Take 1 tablet by mouth daily (with breakfast). 65 mg       folic acid (FOLVITE) 1 MG tablet Take 1 tablet (1 mg) by mouth daily , except on days that you take methotrexate 26 tablet 11     hydrOXYzine HCl (ATARAX) 25 MG tablet Take 1-2 tablets (25-50 mg) by mouth 3 times daily as needed for itching. 60 tablet 2     methotrexate 2.5 MG tablet Take 10 tablets (25 mg) by mouth every 7 days . Take the same day of the week. 40 tablet 5     naltrexone (DEPADE/REVIA) 50 MG tablet Take 0.5 tablets (25 mg) by mouth daily. 30 tablet 3     omeprazole (PRILOSEC) 10 MG DR capsule Take 20 mg by mouth daily       prochlorperazine (COMPAZINE) 10 MG tablet Take 10 mg by mouth.       pseudoePHEDrine (SUDAFED) 30 MG tablet Take 30 mg by mouth       tazarotene (TAZORAC) 0.1 % external cream Apply to areas of thick scaling on hands and feet daily 60 g 3     triamcinolone (KENALOG) 0.1 % external cream APPLY TO AFFECTED AREA 2 TIMES A DAY FOR 10 DAYS TO ARMS, CHEST, NECK, AND BACK       No current facility-administered medications for this visit.     SOCIAL HISTORY:  never smoker, , 3 kids, contractor, lives in [place], [place]    PHYSICAL EXAMINATION:  Ht 1.676 m (5' 6\")   Wt 72.6 kg (160 lb)   BMI 25.82 kg/m    Wt Readings from Last 5 Encounters:   04/22/25 72.6 kg (160 lb)   04/04/25 74.8 kg (164 lb 14.5 oz)   03/05/25 74.8 kg (164 lb 14.5 oz)   01/13/25 74.6 kg (164 lb 7.4 oz)   12/17/24 74.2 kg (163 lb 9.3 oz)     Skin: Erythema on face/forehead/scalp  HEENT: Sclerae anicteric.  Lungs: Breathing comfortably. No cough.  MSK: Grossly normal movement.  Neuro: Grossly non-focal.  Psych: Alert and oriented. No distress.  Performance status: ECOG 1    LABORATORY DATA: Reviewed by me  Labs reviewed in Care Everywhere  4/17/2025 WBC 6.94, Hb 15.6, platelet 240, creatinine 1, liver labs normal,     Recent Labs   Lab Test 04/03/25  1247 03/05/25  0828 02/10/25  1302 " 09/24/24  1248 08/26/24  1223 07/29/24  1311 07/01/24  1316   WBC 5.4 4.7 5.5   < > 6.5 5.8 4.6   HGB 14.7 14.6 14.1   < > 13.2* 13.6 14.2    222 207   < > 281 284 232   ANEU  --   --   --   --  4.9 3.7 3.4   ANEUTAUTO 4.3 3.9 4.6   < >  --   --   --    ALYM  --   --   --   --  0.8 1.5 0.8   ALYMPAUTO 0.4* 0.2* 0.2*   < >  --   --   --     < > = values in this interval not displayed.     Recent Labs   Lab Test 01/13/25  1301 12/16/24  1225 11/18/24  1304 09/24/24  1248 08/26/24  1223 05/28/24  1249 05/01/24  1031    139 137   < > 137   < > 139   POTASSIUM 4.1 4.0 4.2   < > 4.1   < > 4.3   CHLORIDE 100 102 101   < > 102   < > 104   CO2 28 26 27   < > 25   < > 26   BUN 9.7 10.5 11.2   < > 11.9   < > 12.3   CR 0.88 0.90 0.83   < > 0.88   < > 0.95   TASHA 9.3 9.0 9.2   < > 9.1   < > 9.4   URIC  --   --   --   --   --   --  6.4   LDH  --   --  177  --  213  --  246    < > = values in this interval not displayed.     Recent Labs   Lab Test 01/13/25  1301 12/16/24  1225 11/18/24  1304 07/29/24  1311 07/02/24  1234   AST 17 21 21   < >  --    ALT 16 10 16   < >  --    ALKPHOS 90 95 79   < >  --    BILITOTAL 0.6 0.6 0.8   < >  --    INR  --   --   --   --  0.90    < > = values in this interval not displayed.     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    Recent course and workup including recent imaging and skin biopsies are consistent with progressive MF vs Sézary Syndrome that seems to have become refractory to mogamulizumab.  It would be helpful to have the recent biopsies reviewed here or at [facility].  Overall, the workup remains  consistent with clinical stage IVA2 MF vs Sézary Syndrome.  It would be helpful to repeat blood flow cytometry to evaluate the circulating Sézary cell burden and PET/CT may help identify if there are focal areas concerning for large cell transformation, organ involvement, and provide a new baseline for treatment.  At this point changing therapy to try to improve lymphoma and symptom control makes sense.  We reviewed the goal of balancing the benefits of treatment with side effects, potential toxicity, and need for visits and supportive care to maximize the treatment benefit with as little impact on quality of life as possible.  Treatments short of alloBMT are not curative and sequential therapy with maximizing the duration and benefit from each line of therapy is the usual approach with years of disease control being realistic - but responses and outcomes vary widely.     The recent notes indicate that the team at [facility] has recommended brentuximab vedotin if IHC on the biopsies indicates CD30 expression or romidepsin with oral azacitidine if CD30 staining is negative.  I generally agree with this approach.  While there are some data and ongoing investigation regarding romidepsin combined with oral azacitidine for T cell lymphomas, the benefits for progressive MF/Sézary Syndrome vs sequential single agent therapy are less certain.  The team at [facility] may be able to provide more details regarding the up to date evidence regarding this combined approach vs single agent romidepsin which also would be a reasonable if [id number] and may have fewer side effects and require less supportive care.  He has a visit with [facility] next week and will discuss their recommendations in more detail.      Other agents including pralatrexate, liposomal doxorubicin, gemcitabine, combinations, or immune checkpoint therapy may be considered in the future.    I would continue adjunctive ECP and skin directed therapies.  Radiation to any sites  of bothersome or threatening lymphoma is also reasonable.      There are no active ID issues.  He is up to date for Prevnar 20 and Shingrix vaccines.  I would recommend the RSV vaccine.  I have also recommended keeping up to date with the flu shot and COVID-19 vaccine, although he has declined these.    He will continue to work with his primary care clinic for health maintenance and other medical issues.  We will keep them in the loop.    He will continue to work with Dr. Rush Johnson locally for lymphoma care, [facility] for treatment recommendations, and Dr. Ezra Rico from our [facility] Lymphoma Team for skin directed therapy and ECP.  We will request a visit with me in about 4 months.  I reminded him to contact us if questions, concerns, or new issues come up between visits.    I spent 40 minutes on the date of encounter of which 30 minutes spent in medical discussion.    The longitudinal plan of care for the diagnosis(es)/condition(s) as documented were addressed during this visit. Due to the added complexity in care, I will continue to support Blane in the subsequent management and with ongoing continuity of care.    Roberto Everett MD, PhD  Attending Physician, [facility]  [phone] clinic    Addendum: Biopsy results from 4/10/2025 skin showed 10% CD30+ in part A and less than 5% CD30+ in part B. Based on expression >= 10% in one biopsy brentuximab vedotin is a reasonable next line of treatment. Discussed schedule up to 16 cycles every 3 weeks and possible side effects including fatigue, infection, low blood counts, organ damage, and neuropathy that needs to be balanced against lymphoma risks.    Again, thank you for allowing me to participate in the care of your patient.        Sincerely,        Roberto Everett MD    Electronically signed

## 2025-04-22 NOTE — TELEPHONE ENCOUNTER
Faxed request to [facility] ([phone]) for slides from case [id number] per in-basket request of JAKE Valderrama.  [facility]: [id number]

## 2025-04-22 NOTE — NURSING NOTE
Current patient location: [address] [facility] [address]    Is the patient currently in the state of [place]? YES    Visit mode: VIDEO    If the visit is dropped, the patient can be reconnected by:VIDEO VISIT: Send to e-mail at: [email or web address]    Will anyone else be joining the visit? NO  (If patient encounters technical issues they should call [phone]    Are changes needed to the allergy or medication list? No    Are refills needed on medications prescribed by this physician? NO    Rooming Documentation:  Questionnaire(s) completed    Reason for visit: MIGUELINA HARVEY

## 2025-04-24 ENCOUNTER — PATIENT OUTREACH (OUTPATIENT)
Dept: ONCOLOGY | Facility: CLINIC | Age: 65
End: 2025-04-24
Payer: COMMERCIAL

## 2025-04-24 NOTE — PROGRESS NOTES
[facility]: Cancer Care                                                                                          Call to Shama RNCC at [facility] to request peripheral cytometry with T cell analysis.  She states she will have orders placed and drawn at next lab draw.    Dr Everett requesting that derm biopsy slides to be sent to our pathology for review.  Message sent to Minal Rain to request slides.      Taylor Rubi, FAISALN, RN  RN Care Coordinator  [facility]

## 2025-04-30 ENCOUNTER — LAB REQUISITION (OUTPATIENT)
Dept: LAB | Facility: CLINIC | Age: 65
End: 2025-04-30
Payer: COMMERCIAL

## 2025-04-30 PROCEDURE — 88321 CONSLTJ&REPRT SLD PREP ELSWR: CPT | Performed by: DERMATOLOGY

## 2025-05-05 RX ORDER — CALCIUM CARBONATE 500 MG/1
500 TABLET, CHEWABLE ORAL
Status: CANCELLED | OUTPATIENT
Start: 2025-05-05

## 2025-05-06 ENCOUNTER — HOSPITAL ENCOUNTER (OUTPATIENT)
Dept: LAB | Facility: CLINIC | Age: 65
Discharge: HOME | End: 2025-05-06
Attending: STUDENT IN AN ORGANIZED HEALTH CARE EDUCATION/TRAINING PROGRAM | Admitting: STUDENT IN AN ORGANIZED HEALTH CARE EDUCATION/TRAINING PROGRAM
Payer: COMMERCIAL

## 2025-05-06 ENCOUNTER — OFFICE VISIT (OUTPATIENT)
Dept: URGENT CARE | Facility: URGENT CARE | Age: 65
End: 2025-05-06
Payer: COMMERCIAL

## 2025-05-06 ENCOUNTER — OFFICE VISIT (OUTPATIENT)
Dept: DERMATOLOGY | Facility: CLINIC | Age: 65
End: 2025-05-06
Payer: COMMERCIAL

## 2025-05-06 ENCOUNTER — PATIENT OUTREACH (OUTPATIENT)
Dept: ONCOLOGY | Facility: CLINIC | Age: 65
End: 2025-05-06

## 2025-05-06 VITALS
SYSTOLIC BLOOD PRESSURE: 112 MMHG | TEMPERATURE: 98 F | BODY MASS INDEX: 25.51 KG/M2 | RESPIRATION RATE: 18 BRPM | HEART RATE: 62 BPM | DIASTOLIC BLOOD PRESSURE: 61 MMHG | WEIGHT: 158.07 LBS

## 2025-05-06 VITALS
WEIGHT: 154 LBS | SYSTOLIC BLOOD PRESSURE: 105 MMHG | BODY MASS INDEX: 24.86 KG/M2 | OXYGEN SATURATION: 100 % | TEMPERATURE: 98.5 F | DIASTOLIC BLOOD PRESSURE: 68 MMHG | RESPIRATION RATE: 16 BRPM | HEART RATE: 67 BPM

## 2025-05-06 DIAGNOSIS — H60.392 OTHER INFECTIVE ACUTE OTITIS EXTERNA OF LEFT EAR: Primary | ICD-10-CM

## 2025-05-06 DIAGNOSIS — R59.0 PAROTID LYMPHADENOPATHY: Primary | ICD-10-CM

## 2025-05-06 DIAGNOSIS — H61.21 IMPACTED CERUMEN OF RIGHT EAR: ICD-10-CM

## 2025-05-06 DIAGNOSIS — C84.11: ICD-10-CM

## 2025-05-06 DIAGNOSIS — B37.0 THRUSH: ICD-10-CM

## 2025-05-06 LAB
BASOPHILS # BLD AUTO: 0 10E3/UL (ref 0–0.2)
BASOPHILS NFR BLD AUTO: 1 %
EOSINOPHIL # BLD AUTO: 0.1 10E3/UL (ref 0–0.7)
EOSINOPHIL NFR BLD AUTO: 2 %
ERYTHROCYTE [DISTWIDTH] IN BLOOD BY AUTOMATED COUNT: 13.2 % (ref 10–15)
HCT VFR BLD AUTO: 43.1 % (ref 40–53)
HGB BLD-MCNC: 14.7 G/DL (ref 13.3–17.7)
IMM GRANULOCYTES # BLD: 0 10E3/UL
IMM GRANULOCYTES NFR BLD: 0 %
LYMPHOCYTES # BLD AUTO: 1.2 10E3/UL (ref 0.8–5.3)
LYMPHOCYTES NFR BLD AUTO: 22 %
MCH RBC QN AUTO: 29.7 PG (ref 26.5–33)
MCHC RBC AUTO-ENTMCNC: 34.1 G/DL (ref 31.5–36.5)
MCV RBC AUTO: 87 FL (ref 78–100)
MONOCYTES # BLD AUTO: 0.8 10E3/UL (ref 0–1.3)
MONOCYTES NFR BLD AUTO: 15 %
NEUTROPHILS # BLD AUTO: 3.3 10E3/UL (ref 1.6–8.3)
NEUTROPHILS NFR BLD AUTO: 60 %
NRBC # BLD AUTO: 0 10E3/UL
NRBC BLD AUTO-RTO: 0 /100
PLATELET # BLD AUTO: 214 10E3/UL (ref 150–450)
RBC # BLD AUTO: 4.95 10E6/UL (ref 4.4–5.9)
WBC # BLD AUTO: 5.4 10E3/UL (ref 4–11)

## 2025-05-06 PROCEDURE — 36415 COLL VENOUS BLD VENIPUNCTURE: CPT

## 2025-05-06 PROCEDURE — 3074F SYST BP LT 130 MM HG: CPT | Performed by: PHYSICIAN ASSISTANT

## 2025-05-06 PROCEDURE — 250N000009 HC RX 250

## 2025-05-06 PROCEDURE — 87102 FUNGUS ISOLATION CULTURE: CPT | Performed by: STUDENT IN AN ORGANIZED HEALTH CARE EDUCATION/TRAINING PROGRAM

## 2025-05-06 PROCEDURE — 99000 SPECIMEN HANDLING OFFICE-LAB: CPT | Performed by: PATHOLOGY

## 2025-05-06 PROCEDURE — 36522 PHOTOPHERESIS: CPT

## 2025-05-06 PROCEDURE — 99215 OFFICE O/P EST HI 40 MIN: CPT | Performed by: STUDENT IN AN ORGANIZED HEALTH CARE EDUCATION/TRAINING PROGRAM

## 2025-05-06 PROCEDURE — 99417 PROLNG OP E/M EACH 15 MIN: CPT | Performed by: STUDENT IN AN ORGANIZED HEALTH CARE EDUCATION/TRAINING PROGRAM

## 2025-05-06 PROCEDURE — 85025 COMPLETE CBC W/AUTO DIFF WBC: CPT

## 2025-05-06 PROCEDURE — 1125F AMNT PAIN NOTED PAIN PRSNT: CPT | Performed by: STUDENT IN AN ORGANIZED HEALTH CARE EDUCATION/TRAINING PROGRAM

## 2025-05-06 PROCEDURE — 99203 OFFICE O/P NEW LOW 30 MIN: CPT | Performed by: PHYSICIAN ASSISTANT

## 2025-05-06 PROCEDURE — G2211 COMPLEX E/M VISIT ADD ON: HCPCS | Performed by: STUDENT IN AN ORGANIZED HEALTH CARE EDUCATION/TRAINING PROGRAM

## 2025-05-06 PROCEDURE — 3078F DIAST BP <80 MM HG: CPT | Performed by: PHYSICIAN ASSISTANT

## 2025-05-06 RX ORDER — CIPROFLOXACIN AND DEXAMETHASONE 3; 1 MG/ML; MG/ML
4 SUSPENSION/ DROPS AURICULAR (OTIC) 2 TIMES DAILY
Qty: 10 ML | Refills: 0 | Status: SHIPPED | OUTPATIENT
Start: 2025-05-06

## 2025-05-06 RX ORDER — NYSTATIN 100000 [USP'U]/ML
SUSPENSION ORAL
Qty: 473 ML | Refills: 1 | Status: SHIPPED | OUTPATIENT
Start: 2025-05-06

## 2025-05-06 RX ORDER — CALCIUM CARBONATE 500 MG/1
500 TABLET, CHEWABLE ORAL
Status: CANCELLED | OUTPATIENT
Start: 2025-05-06

## 2025-05-06 RX ORDER — CIPROFLOXACIN AND DEXAMETHASONE 3; 1 MG/ML; MG/ML
4 SUSPENSION/ DROPS AURICULAR (OTIC) 2 TIMES DAILY
Qty: 10 ML | Refills: 0 | Status: SHIPPED | OUTPATIENT
Start: 2025-05-06 | End: 2025-05-06

## 2025-05-06 RX ORDER — TRIAMCINOLONE ACETONIDE 1 MG/G
CREAM TOPICAL
Qty: 453.6 G | Refills: 3 | Status: SHIPPED | OUTPATIENT
Start: 2025-05-06

## 2025-05-06 RX ADMIN — ANTICOAGULANT CITRATE DEXTROSE SOLUTION FORMULA A 217 ML: 12.25; 11; 3.65 SOLUTION INTRAVENOUS at 13:38

## 2025-05-06 ASSESSMENT — PAIN SCALES - GENERAL: PAINLEVEL_OUTOF10: MILD PAIN (3)

## 2025-05-06 NOTE — PROGRESS NOTES
Referral received for Radiation Oncology from Dermatology care team. Patient currently being seen by Dr. Rico and [facility] for diagnosis of T-Cell lymphoma. Referral placed for consult for radiation therapy for increased ulceration of head/scalp/mouth. Call placed to patient to discuss referral to Radiation Oncology. Referral instructions updated and sent to NPS for completion and scheduling.     normal...

## 2025-05-06 NOTE — PROGRESS NOTES
I have personally examined this patient and agree with the medical student's documentation and plan of care. I have reviewed and amended the medical student's note as necessary.The documentation accurately reflects my clinical observations, diagnoses, treatment and follow-up plans. Provider Time: Established 55 minutes spent on the date of the encounter doing chart review, patient visit (including history, exam, and counseling), and documentation, excluding any procedures performed.       Ezra Rico MD  Dermatology Staff      [facility] Dermatology Note  Encounter Date: May 6, 2025  Office Visit     Dermatology Problem List:   #MF stage IVA2 (T4 N3 B1); (Treatment considerations include distance traveled, 300 miles from the [place])  - Previous tx: Methotrexate, nbUVB,Mogamalizumab  - Current treatment: ECP, considering brentuximab and TSEBT  -Erythrodermic, flow with 500 Sezary cells       - 02/27/2024 discussed treatment options plan to proceed with ECP in agreement with oncologist, start methotrexate 25 mg weekly with folic acid, assess response in 3 months keeping in mind that ECP has maximum results sometimes up to 6 months.  Continue home light boost therapy, not titrating up dose recommended finding the instruction manual for his light unit and titrating up as directed. Start bleach baths  - 04/28/24 enlarged but still nominally normal LN in axilla, new inguinal LAD  - 05/01/24 Eval'd by onc, enlarged LN noted discussed careful monitoring w/ consideration of future imaging and bx  - 05/28/24 after discussion w/ pt they would prefer US of lN for size/structure eval. IR referral for bx L LN. Consider transition from methotrexate to bexarotene or IFN. Updated Onc.   - 07/02/2 LN + for MF (no LCT). Now stage IVA2   - 07/17/24 discussed Ania vs IFN  - 07/23/24 2nd opinion w/ [place] who recommended mogamulizumab  - 07/30/24 Discussed risks and benefits for treatment options: methotrexate,  interferon,  bexarotine, mogamolizumab, and photopheresis and possible adverse effects. Will speak w/ [facility]  -Peripheral flow with 500 Sezary cells  - 4/22/25 Dr. Everett meeting, discussed [facility] rec for brentux and bendamustine + TSEBT  -04/28/25 PET scan  enlarged calvarial, parotid, cervical supraclavic ular, axillary, epidtrochlear, inguinal/femoral, poplitearl and hilar LN. High uptate 10.4 SUV parotid LN. Dr. Johnson rec bx  - 04/30/25 Scalp punchx2 w/ CD30 expression estimated at 10 and 3%   - 05/06/25 appt w/ me. Referral for [facility] placed. Referral placed to ENT for possible sampling of parotid LN per wishes of outside onc. Rec high dose Vit D (100,000 international unit(s) weekly for radiation dermatitis)      ____________________________________________    Assessment & Plan:      #MF vs Sezary syndrome stage IVA2 (T4, N3, B1)  - Radiation Therapy Referral; Future  - continue [facility]  -PET scan 04/28 with progression of disease and enhancement of parotid LN  -Mogamulizumab (stopped due to tumor progression)  -Current plan (manageed by oncology) : TSEBT (total body skin radiation) followed three weeks later by induction of brentuximab and bendamustine. Bx of scalp lesions showing appreciable low level CD30 expression 10% or less  -Given need for completion of TSEBT, emergent referral to radiation oncology  - Can do high dose Vit D for radiation dermatitis if it occurs     The longitudinal plan of care for the diagnosis(es)/condition(s) as documented were addressed during this visit. Due to the added complexity in care, I will continue to support Blane in the subsequent management and with ongoing continuity of care for Mycosis fungoides.      # Parotid lymphadenopathy (Primary)  - Adult ENT  Referral; Future      #Oral pain ([place] vs other)  - nystatin (MYCOSTATIN) 091675 UNIT/ML suspension; Switch 5ml AND SPIT 3-4 times a day.  Dispense: 473 mL; Refill: 1  - Fungus skin hair nail  culture      ____________________________________________    CC: No chief complaint on file.    HPI:  Mr. Blane Ugalde is a(n) 64 year old male who presents today as a return patient for [facility]    Blane presents today for follow up. He is following w/ Onc at the  and [facility] [place]. [facility] has recommended TSEBT + brentuximab and bendamustine now that he has failed moga and his bx shows measureable CD30 staining      Labs:  Dermatopathology report from 4/10/2025, CBC , and CMP  reviewed.    Physical Exam:  Vitals: There were no vitals taken for this visit.  SKIN: Sun-exposed skin, which includes the head/face, neck, both arms, digits, and/or nails was examined.   - Diffuse erythema affecting all visible skin surfaces  - At the right and posterior scalp, several nodular tumors with overlying scale  - diffuse erythema   - ulcerating tumor R temples  - No other lesions of concern on areas examined.     Medications:  Current Outpatient Medications   Medication Sig Dispense Refill    acetaminophen (TYLENOL) 325 MG tablet Take 650 mg by mouth      clobetasol (TEMOVATE) 0.05 % external ointment Apply topically 2 times daily. Apply thin layer to rash on the legs twice daily 60 g 4    clobetasol (TEMOVATE) 0.05 % external ointment Apply topically 2 times daily 60 g 3    clobetasol propionate (CLOBEX) 0.05 % external shampoo       ferrous sulfate dried 160 (50 Fe) MG tablet Take 1 tablet by mouth daily (with breakfast). 65 mg      folic acid (FOLVITE) 1 MG tablet Take 1 tablet (1 mg) by mouth daily , except on days that you take methotrexate 26 tablet 11    hydrOXYzine HCl (ATARAX) 25 MG tablet Take 1-2 tablets (25-50 mg) by mouth 3 times daily as needed for itching. 60 tablet 2    methotrexate 2.5 MG tablet Take 10 tablets (25 mg) by mouth every 7 days . Take the same day of the week. 40 tablet 5    naltrexone (DEPADE/REVIA) 50 MG tablet Take 0.5 tablets (25 mg) by mouth daily. 30 tablet 3    omeprazole (PRILOSEC) 10 MG DR capsule Take  20 mg by mouth daily      prochlorperazine (COMPAZINE) 10 MG tablet Take 10 mg by mouth.      pseudoePHEDrine (SUDAFED) 30 MG tablet Take 30 mg by mouth      tazarotene (TAZORAC) 0.1 % external cream Apply to areas of thick scaling on hands and feet daily 60 g 3    triamcinolone (KENALOG) 0.1 % external cream APPLY TO AFFECTED AREA 2 TIMES A DAY FOR 10 DAYS TO ARMS, CHEST, NECK, AND BACK       No current facility-administered medications for this visit.      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 on close of this encounter.

## 2025-05-06 NOTE — PROGRESS NOTES
Urgent Care Clinic Visit    Chief Complaint   Patient presents with    Ear Problem     X 2 weeks  Reports bad odder   Bilateral ears clogged  Left side weeping   Slight pressure   Denies fevers               5/6/2025     5:15 PM   Additional Questions   Roomed by liliane auguste

## 2025-05-06 NOTE — PROCEDURES
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   He tolerated it well and is on our schedule for tomorrow.  Notes feeling ok today.  Feels like his lymphnodes are larger and things are catching up with him.  His team has stopped some of his interventions, but he has an upcoming appointments with additional providers, as his team is considering additional interventions for him.  His skin continues to be red.                 History of Present Illness:   Blane Ugalde is a 64 year old male with past medical history of  hyperlipidemia and a prothrombin gene mutation. He has been followed for rashes and cutaneous symptoms since 2011, but has not had a definitive diagnosis and staging for CTCL or Sézary until a skin biopsy, blood and bone marrow work-up in early 2024 . His first ECP was in March of 2024 ECP and  currently has 2 procedures a month on consecutive days               Past Medical History:   No past medical history on file.  CTCL/Sezary          Past Surgical History:     Past Surgical History:   Procedure Laterality Date    APPENDECTOMY      IR LYMPH NODE BIOPSY  7/2/2024             Allergies:   No Known Allergies          Medications:     Current Outpatient Medications   Medication Sig Dispense Refill    acetaminophen (TYLENOL) 325 MG tablet Take 650 mg by mouth      clobetasol (TEMOVATE) 0.05 % external ointment Apply topically 2 times daily. Apply thin layer to rash on the legs twice daily 60 g 4    clobetasol (TEMOVATE) 0.05 % external ointment Apply topically 2 times daily 60 g 3    clobetasol propionate (CLOBEX) 0.05 % external shampoo       ferrous sulfate dried 160 (50 Fe) MG  tablet Take 1 tablet by mouth daily (with breakfast). 65 mg      folic acid (FOLVITE) 1 MG tablet Take 1 tablet (1 mg) by mouth daily , except on days that you take methotrexate 26 tablet 11    hydrOXYzine HCl (ATARAX) 25 MG tablet Take 1-2 tablets (25-50 mg) by mouth 3 times daily as needed for itching. 60 tablet 2    naltrexone (DEPADE/REVIA) 50 MG tablet Take 0.5 tablets (25 mg) by mouth daily. 30 tablet 3    nystatin (MYCOSTATIN) 932400 UNIT/ML suspension Switch 5ml AND SPIT 3-4 times a day. 473 mL 1    omeprazole (PRILOSEC) 10 MG DR capsule Take 20 mg by mouth daily      prochlorperazine (COMPAZINE) 10 MG tablet Take 10 mg by mouth.      pseudoePHEDrine (SUDAFED) 30 MG tablet Take 30 mg by mouth      tazarotene (TAZORAC) 0.1 % external cream Apply to areas of thick scaling on hands and feet daily 60 g 3    triamcinolone (KENALOG) 0.1 % external cream Apply twice a day, avoiding skin folds, face, and groin area. 453.6 g 3    triamcinolone (KENALOG) 0.1 % external cream APPLY TO AFFECTED AREA 2 TIMES A DAY FOR 10 DAYS TO ARMS, CHEST, NECK, AND BACK      methotrexate 2.5 MG tablet Take 10 tablets (25 mg) by mouth every 7 days . Take the same day of the week. 40 tablet 5     Current Facility-Administered Medications   Medication Dose Route Frequency Provider Last Rate Last Admin    methoxsalen (photopheresis) SOLN   Apheresis DOES NOT GO TO Wendie Hyman MD                 Review of Systems:     See above           Exam:   /61   Pulse 62   Temp 98  F (36.7  C) (Oral)   Resp 18   Wt 71.7 kg (158 lb 1.1 oz)   BMI 25.51 kg/m    Alert, no apparent distress  All exposed/visible skin appears red.  Breathing appears comfortable on room air  Peripheral IV access for the procedure.             Data:     Results for orders placed or performed during the hospital encounter of 05/06/25   CBC with platelets and differential     Status: None   Result Value Ref Range    WBC Count 5.4 4.0 - 11.0 10e3/uL    RBC  Count 4.95 4.40 - 5.90 10e6/uL    Hemoglobin 14.7 13.3 - 17.7 g/dL    Hematocrit 43.1 40.0 - 53.0 %    MCV 87 78 - 100 fL    MCH 29.7 26.5 - 33.0 pg    MCHC 34.1 31.5 - 36.5 g/dL    RDW 13.2 10.0 - 15.0 %    Platelet Count 214 150 - 450 10e3/uL    % Neutrophils 60 %    % Lymphocytes 22 %    % Monocytes 15 %    % Eosinophils 2 %    % Basophils 1 %    % Immature Granulocytes 0 %    NRBCs per 100 WBC 0 <1 /100    Absolute Neutrophils 3.3 1.6 - 8.3 10e3/uL    Absolute Lymphocytes 1.2 0.8 - 5.3 10e3/uL    Absolute Monocytes 0.8 0.0 - 1.3 10e3/uL    Absolute Eosinophils 0.1 0.0 - 0.7 10e3/uL    Absolute Basophils 0.0 0.0 - 0.2 10e3/uL    Absolute Immature Granulocytes 0.0 <=0.4 10e3/uL    Absolute NRBCs 0.0 10e3/uL   CBC with platelets differential     Status: None    Narrative    The following orders were created for panel order CBC with platelets differential.  Procedure                               Abnormality         Status                     ---------                               -----------         ------                     CBC with platelets and ...[[id number]]                      Final result                 Please view results for these tests on the individual orders.                Procedure Summary:   Extracorporeal photopheresis was performed on a [employer] device. Peripheral IV was used for access. ACD-A/saline was used to prime the circuit and ACD-A was used during the procedure for anticoagulation.  The patient's vital signs were stable throughout.  The patient tolerated the procedure well without complication.  See apheresis flowsheet for additional details.           Attestation: During the procedure the patient was directly seen and evaluated by me, Tremayne Gillis MD.  I have reviewed the chart and pertinent laboratory findings, and discussed the patient and the current procedure with the Apheresis nursing staff.    Tremayne Gillis MD  Transfusion Medicine Attending  Laboratory Medicine  & Pathology

## 2025-05-06 NOTE — NURSING NOTE
Dermatology Rooming Note    Blane Ugalde's goals for this visit include:   Chief Complaint   Patient presents with    Autoimmune Disease     Mycosis fungoides follow up- he reports itchiness, fatigue and pain on face. He reports new sores in mouth and lips.      Gagandeep KEY CMA - Dermatology

## 2025-05-06 NOTE — PATIENT INSTRUCTIONS
-We're referring you emergent to radiation oncology for consideration of getting your radiation started as soon as possible. Please expect to hear from someone in the next day or two.     -We've place a urgent referral to ENT for consideration of your 1) congestion and 2) the parotid biopsy. We may or may not be able to get this done while you're down here.     -Plan is for high dose vitamin D after your radiation therapy, which can help with some of the side effects.

## 2025-05-06 NOTE — LETTER
5/6/2025       RE: Blane Ugalde  [address]  [facility] [address]     Dear Colleague,    Thank you for referring your patient, Blane Ugalde, to the [facility] DERMATOLOGY [facility] [place] at [facility]. Please see a copy of my visit note below.    I have personally examined this patient and agree with the medical student's documentation and plan of care. I have reviewed and amended the medical student's note as necessary.The documentation accurately reflects my clinical observations, diagnoses, treatment and follow-up plans. Provider Time: Established 55 minutes spent on the date of the encounter doing chart review, patient visit (including history, exam, and counseling), and documentation, excluding any procedures performed.       Ezra Rico MD  Dermatology Staff      [facility] Dermatology Note  Encounter Date: May 6, 2025  Office Visit     Dermatology Problem List:   #MF stage IVA2 (T4 N3 B1); (Treatment considerations include distance traveled, 300 miles from the [place])  - Previous tx: Methotrexate, nbUVB,Mogamalizumab  - Current treatment: ECP, considering brentuximab and TSEBT  -Erythrodermic, flow with 500 Sezary cells    CT scan 02/01/24  IMPRESSION:   1. Prominent, rounded but not pathologically enlarged RIGHT axillary lymph nodes indeterminate for lymphomatous involvement.   2.  No additional pathologically enlarged lymph nodes within the chest, abdomen or pelvis.   3.  Hepatic steatosis.   8.  Enlarged main pulmonary artery can be seen with pulmonary arterial hypertension.      - 02/27/2024 discussed treatment options plan to proceed with ECP in agreement with oncologist, start methotrexate 25 mg weekly with folic acid, assess response in 3 months keeping in mind that ECP has maximum results sometimes up to 6 months.  Continue home light boost therapy, not titrating up dose recommended finding the instruction  manual for his light unit and titrating up as directed. Start bleach baths  - 04/28/24 enlarged but still nominally normal LN in axilla, new inguinal LAD  - 05/01/24 Eval'd by onc, enlarged LN noted discussed careful monitoring w/ consideration of future imaging and bx  - 05/28/24 after discussion w/ pt they would prefer US of lN for size/structure eval. IR referral for bx L LN. Consider transition from methotrexate to bexarotene or IFN. Updated Onc.   - 07/02/2 LN + for MF (no LCT). Now stage IVA2   - 07/17/24 discussed Ania vs IFN  - 07/23/24 2nd opinion w/ Pierson who recommended mogamulizumab  - 07/30/24 Discussed risks and benefits for treatment options: methotrexate, interferon,  bexarotine, mogamolizumab, and photopheresis and possible adverse effects. Will speak w/ Karlos  -Peripheral flow with 500 Sezary cells, CT scan with 9 mm right axillary lymph node  - 4/22/25 Dr. Everett meeting, discussed [facility] rec for brentux and bendamustine + TSEBT  -04/28/25 PET scan  enlarged calvarial, parotid, cervical supraclavic ular, axillary, epidtrochlear, inguinal/femoral, poplitearl and hilar LN. High uptate 10.4 SUV parotid LN. Dr. Johnson rec bx  - 04/30/25 Scalp punchx2 w/ CD30 expression estimated at 10 and 3%   - 05/06/25 appt w/ me. Referral for [facility] placed. Referral placed to ENT for possible sampling of parotid LN per wishes of outside onc. Rec high dose Vit D (100,000 international unit(s) weekly for radiation dermatitis)      ____________________________________________    Assessment & Plan:      #MF vs Sezary syndrome stage IVA2 (T4, N3, B1)  - Radiation Therapy Referral; Future  - continue TMC  -PET scan 04/28 with progression of disease and enhancement of parotid LN  -Mogamulizumab (stopped due to tumor progression)  -Current plan (manageed by oncology) : TSEBT (total body skin radiation) followed three weeks later by induction of brentuximab and bendamustine. Bx of scalp lesions showing appreciable low  level CD30 expression 10% or less  -Given need for completion of TSEBT, emergent referral to radiation oncology  - Can do high dose Vit D for radiation dermatitis if it occurs     The longitudinal plan of care for the diagnosis(es)/condition(s) as documented were addressed during this visit. Due to the added complexity in care, I will continue to support Blane in the subsequent management and with ongoing continuity of care for Mycosis fungoides.      # Parotid lymphadenopathy (Primary)  - Adult ENT  Referral; Future      #Oral pain (Thrush vs other)  - nystatin (MYCOSTATIN) 872085 UNIT/ML suspension; Switch 5ml AND SPIT 3-4 times a day.  Dispense: 473 mL; Refill: 1  - Fungus skin hair nail culture      ____________________________________________    CC: No chief complaint on file.    HPI:  Mr. Blane Ugalde is a(n) 64 year old male who presents today as a return patient for CTCL    Blane presents today for follow up. He is following w/ Onc at the  and [facility]. [facility] has recommended TSEBT + brentuximab and bendamustine now that he has failed moga and his bx shows measureable CD30 staining      Labs:  Dermatopathology report from 4/10/2025, CBC , and CMP  reviewed.    Physical Exam:  Vitals: There were no vitals taken for this visit.  SKIN: Sun-exposed skin, which includes the head/face, neck, both arms, digits, and/or nails was examined.   - Diffuse erythema affecting all visible skin surfaces  - At the right and posterior scalp, several nodular tumors with overlying scale  - diffuse erythema   - ulcerating tumor R temples  - No other lesions of concern on areas examined.     Medications:  Current Outpatient Medications   Medication Sig Dispense Refill     acetaminophen (TYLENOL) 325 MG tablet Take 650 mg by mouth       clobetasol (TEMOVATE) 0.05 % external ointment Apply topically 2 times daily. Apply thin layer to rash on the legs twice daily 60 g 4     clobetasol (TEMOVATE) 0.05 % external ointment Apply  topically 2 times daily 60 g 3     clobetasol propionate (CLOBEX) 0.05 % external shampoo        ferrous sulfate dried 160 (50 Fe) MG tablet Take 1 tablet by mouth daily (with breakfast). 65 mg       folic acid (FOLVITE) 1 MG tablet Take 1 tablet (1 mg) by mouth daily , except on days that you take methotrexate 26 tablet 11     hydrOXYzine HCl (ATARAX) 25 MG tablet Take 1-2 tablets (25-50 mg) by mouth 3 times daily as needed for itching. 60 tablet 2     methotrexate 2.5 MG tablet Take 10 tablets (25 mg) by mouth every 7 days . Take the same day of the week. 40 tablet 5     naltrexone (DEPADE/REVIA) 50 MG tablet Take 0.5 tablets (25 mg) by mouth daily. 30 tablet 3     omeprazole (PRILOSEC) 10 MG DR capsule Take 20 mg by mouth daily       prochlorperazine (COMPAZINE) 10 MG tablet Take 10 mg by mouth.       pseudoePHEDrine (SUDAFED) 30 MG tablet Take 30 mg by mouth       tazarotene (TAZORAC) 0.1 % external cream Apply to areas of thick scaling on hands and feet daily 60 g 3     triamcinolone (KENALOG) 0.1 % external cream APPLY TO AFFECTED AREA 2 TIMES A DAY FOR 10 DAYS TO ARMS, CHEST, NECK, AND BACK       No current facility-administered medications for this visit.      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 on close of this encounter.      Again, thank you for allowing me to participate in the care of your patient.      Sincerely,    Ezra Rico MD

## 2025-05-06 NOTE — PROGRESS NOTES
Chief Complaint   Patient presents with    Ear Problem     X 2 weeks  Reports bad odder   Bilateral ears clogged  Left side weeping   Slight pressure   Denies fevers       ASSESSMENT/PLAN:  Blane was seen today for ear problem.    Diagnoses and all orders for this visit:    Other infective acute otitis externa of left ear  -     ciprofloxacin-dexAMETHasone (CIPRODEX) 0.3-0.1 % otic suspension; Place 4 drops Into the left ear 2 times daily for 7 days.    Impacted cerumen of right ear  -     TX REMOVAL IMPACTED CERUMEN IRRIGATION/LVG UNILAT    Left tympanic membrane has a possible rupture but unable to fully appreciate due to pus in the external auditory canal.  Start Cipro drops above.  Water precautions discussed.  Right ear had some mild cerumen buildup that we irrigated via MA.  He was referred to ENT by his oncology and dermatology team earlier today, I reviewed the notes, I would recommend him have the ENT reevaluate his left ear at that time    Silverio Adams PA-C      SUBJECTIVE:  Blane is a 64 year old male who presents to urgent care with a few weeks of pressure and clogged sensation of both ears but mainly in the left.  It has drained at times and cause some bad odor.  He typically has cerumen buildup once or twice a year.  No fevers or chills.  Some mild on and off pain    ROS: Pertinent ROS neg other than the symptoms noted above in the HPI.     OBJECTIVE:  /68 (BP Location: Right arm, Patient Position: Sitting, Cuff Size: Adult Regular)   Pulse 67   Temp 98.5  F (36.9  C) (Temporal)   Resp 16   Wt 69.9 kg (154 lb)   SpO2 100%   BMI 24.86 kg/m     GENERAL: alert and no distress  HENT: Left tympanic membrane has a possible rupture but unable to fully appreciate due to pus in the external auditory canal.  Start Cipro drops above.  Water precautions discussed.  Right ear had some mild cerumen buildup that we irrigated via MA.    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   He tolerated it well and is on our schedule for tomorrow.  Notes   feeling ok today.  Feels like his lymphnodes are larger and   things are catching up with him.  His team has stopped some of   his interventions, but he has an upcoming appointments with   additional providers, as his team is considering additional   interventions for him.  His skin continues to be red.                 History of Present Illness:   Blane Ugalde is a 64 year old male with past medical history of    hyperlipidemia and a prothrombin gene mutation. He has been   followed for rashes and cutaneous symptoms since 2011, but has   not had a definitive diagnosis and staging for CTCL or Sézary   until a skin biopsy, blood and bone marrow work-up in early 2024   . His first ECP was in March of 2024 ECP and  currently has 2   procedures a month on consecutive days               Past Medical History:   No past medical history on file.  CTCL/Sezary          Past Surgical History:     Past Surgical History:   Procedure Laterality Date    APPENDECTOMY      IR LYMPH NODE BIOPSY  7/2/2024             Allergies:   No Known Allergies          Medications:     Current Outpatient Medications   Medication Sig Dispense Refill    acetaminophen (TYLENOL) 325 MG tablet Take 650 mg by mouth      clobetasol (TEMOVATE) 0.05 % external ointment Apply topically 2   times daily. Apply thin layer to rash on the legs twice daily 60   g 4    clobetasol  (TEMOVATE) 0.05 % external ointment Apply topically 2   times daily 60 g 3    clobetasol propionate (CLOBEX) 0.05 % external shampoo       ferrous sulfate dried 160 (50 Fe) MG tablet Take 1 tablet by   mouth daily (with breakfast). 65 mg      folic acid (FOLVITE) 1 MG tablet Take 1 tablet (1 mg) by mouth   daily , except on days that you take methotrexate 26 tablet 11    hydrOXYzine HCl (ATARAX) 25 MG tablet Take 1-2 tablets (25-50   mg) by mouth 3 times daily as needed for itching. 60 tablet 2    naltrexone (DEPADE/REVIA) 50 MG tablet Take 0.5 tablets (25 mg)   by mouth daily. 30 tablet 3    nystatin (MYCOSTATIN) 045615 UNIT/ML suspension Switch 5ml AND   SPIT 3-4 times a day. 473 mL 1    omeprazole (PRILOSEC) 10 MG DR capsule Take 20 mg by mouth daily        prochlorperazine (COMPAZINE) 10 MG tablet Take 10 mg by mouth.        pseudoePHEDrine (SUDAFED) 30 MG tablet Take 30 mg by mouth      tazarotene (TAZORAC) 0.1 % external cream Apply to areas of   thick scaling on hands and feet daily 60 g 3    triamcinolone (KENALOG) 0.1 % external cream Apply twice a day,   avoiding skin folds, face, and groin area. 453.6 g 3    triamcinolone (KENALOG) 0.1 % external cream APPLY TO AFFECTED   AREA 2 TIMES A DAY FOR 10 DAYS TO ARMS, CHEST, NECK, AND BACK      methotrexate 2.5 MG tablet Take 10 tablets (25 mg) by mouth   every 7 days . Take the same day of the week. 40 tablet 5     Current Facility-Administered Medications   Medication Dose Route Frequency Provider Last Rate Last Admin    methoxsalen (photopheresis) SOLN   Apheresis DOES NOT GO TO Wendie Hyman MD                 Review of Systems:     See above           Exam:   /61   Pulse 62   Temp 98  F (36.7  C) (Oral)   Resp 18     Wt 71.7 kg (158 lb 1.1 oz)   BMI 25.51 kg/m    Alert, no apparent distress  All exposed/visible skin appears red.  Breathing appears comfortable on room air  Peripheral IV access for the procedure.             Data:      Results for orders placed or performed during the hospital   encounter of 05/06/25   CBC with platelets and differential     Status: None   Result Value Ref Range    WBC Count 5.4 4.0 - 11.0 10e3/uL    RBC Count 4.95 4.40 - 5.90 10e6/uL    Hemoglobin 14.7 13.3 - 17.7 g/dL    Hematocrit 43.1 40.0 - 53.0 %    MCV 87 78 - 100 fL    MCH 29.7 26.5 - 33.0 pg    MCHC 34.1 31.5 - 36.5 g/dL    RDW 13.2 10.0 - 15.0 %    Platelet Count 214 150 - 450 10e3/uL    % Neutrophils 60 %    % Lymphocytes 22 %    % Monocytes 15 %    % Eosinophils 2 %    % Basophils 1 %    % Immature Granulocytes 0 %    NRBCs per 100 WBC 0 <1 /100    Absolute Neutrophils 3.3 1.6 - 8.3 10e3/uL    Absolute Lymphocytes 1.2 0.8 - 5.3 10e3/uL    Absolute Monocytes 0.8 0.0 - 1.3 10e3/uL    Absolute Eosinophils 0.1 0.0 - 0.7 10e3/uL    Absolute Basophils 0.0 0.0 - 0.2 10e3/uL    Absolute Immature Granulocytes 0.0 <=0.4 10e3/uL    Absolute NRBCs 0.0 10e3/uL   CBC with platelets differential     Status: None    Narrative    The following orders were created for panel order CBC with   platelets differential.  Procedure                               Abnormality           Status                     ---------                               -----------           ------                     CBC with platelets and ...[[id number]]                      Final   result                 Please view results for these tests on the individual orders.                Procedure Summary:   Extracorporeal photopheresis was performed on a [employer] device.   Peripheral IV was used for access. ACD-A/saline was used to prime   the circuit and ACD-A was used during the procedure for   anticoagulation.  The patient's vital signs were stable   throughout.  The patient tolerated the procedure well without   complication.  See apheresis flowsheet for additional details.           Attestation: During the procedure the patient was directly seen   and evaluated by me, Tremayne Gillis MD.   I have reviewed   the chart and pertinent laboratory findings, and discussed the   patient and the current procedure with the Apheresis nursing   staff.    Tremayne Gillis MD  Transfusion Medicine Attending  Laboratory Medicine & Pathology            CBC with platelets and differential     Status: None   Result Value Ref Range    WBC Count 5.4 4.0 - 11.0 10e3/uL    RBC Count 4.95 4.40 - 5.90 10e6/uL    Hemoglobin 14.7 13.3 - 17.7 g/dL    Hematocrit 43.1 40.0 - 53.0 %    MCV 87 78 - 100 fL    MCH 29.7 26.5 - 33.0 pg    MCHC 34.1 31.5 - 36.5 g/dL    RDW 13.2 10.0 - 15.0 %    Platelet Count 214 150 - 450 10e3/uL    % Neutrophils 60 %    % Lymphocytes 22 %    % Monocytes 15 %    % Eosinophils 2 %    % Basophils 1 %    % Immature Granulocytes 0 %    NRBCs per 100 WBC 0 <1 /100    Absolute Neutrophils 3.3 1.6 - 8.3 10e3/uL    Absolute Lymphocytes 1.2 0.8 - 5.3 10e3/uL    Absolute Monocytes 0.8 0.0 - 1.3 10e3/uL    Absolute Eosinophils 0.1 0.0 - 0.7 10e3/uL    Absolute Basophils 0.0 0.0 - 0.2 10e3/uL    Absolute Immature Granulocytes 0.0 <=0.4 10e3/uL    Absolute NRBCs 0.0 10e3/uL   CBC with platelets differential     Status: None    Narrative    The following orders were created for panel order CBC with platelets differential.  Procedure                               Abnormality         Status                     ---------                               -----------         ------                     CBC with platelets and ...[[id number]]                      Final result                 Please view results for these tests on the individual orders.        Current Outpatient Medications   Medication Sig Dispense Refill    acetaminophen (TYLENOL) 325 MG tablet Take 650 mg by mouth      ciprofloxacin-dexAMETHasone (CIPRODEX) 0.3-0.1 % otic suspension Place 4 drops Into the left ear 2 times daily for 7 days. 10 mL 0    clobetasol (TEMOVATE) 0.05 % external ointment Apply topically 2 times daily. Apply thin layer to rash  on the legs twice daily 60 g 4    clobetasol (TEMOVATE) 0.05 % external ointment Apply topically 2 times daily 60 g 3    clobetasol propionate (CLOBEX) 0.05 % external shampoo       ferrous sulfate dried 160 (50 Fe) MG tablet Take 1 tablet by mouth daily (with breakfast). 65 mg      folic acid (FOLVITE) 1 MG tablet Take 1 tablet (1 mg) by mouth daily , except on days that you take methotrexate 26 tablet 11    hydrOXYzine HCl (ATARAX) 25 MG tablet Take 1-2 tablets (25-50 mg) by mouth 3 times daily as needed for itching. 60 tablet 2    methotrexate 2.5 MG tablet Take 10 tablets (25 mg) by mouth every 7 days . Take the same day of the week. 40 tablet 5    naltrexone (DEPADE/REVIA) 50 MG tablet Take 0.5 tablets (25 mg) by mouth daily. 30 tablet 3    nystatin (MYCOSTATIN) 233437 UNIT/ML suspension Switch 5ml AND SPIT 3-4 times a day. 473 mL 1    omeprazole (PRILOSEC) 10 MG DR capsule Take 20 mg by mouth daily      prochlorperazine (COMPAZINE) 10 MG tablet Take 10 mg by mouth.      pseudoePHEDrine (SUDAFED) 30 MG tablet Take 30 mg by mouth      tazarotene (TAZORAC) 0.1 % external cream Apply to areas of thick scaling on hands and feet daily 60 g 3    triamcinolone (KENALOG) 0.1 % external cream Apply twice a day, avoiding skin folds, face, and groin area. 453.6 g 3    triamcinolone (KENALOG) 0.1 % external cream APPLY TO AFFECTED AREA 2 TIMES A DAY FOR 10 DAYS TO ARMS, CHEST, NECK, AND BACK       No current facility-administered medications for this visit.     Facility-Administered Medications Ordered in Other Visits   Medication Dose Route Frequency Provider Last Rate Last Admin    methoxsalen (photopheresis) SOLN   Apheresis DOES NOT GO TO Wendie Hyman MD          Patient Active Problem List   Diagnosis    Mycosis fungoides involving extranodal site excluding spleen and other solid organs (H)    Sezary disease involving lymph nodes of multiple regions (H)    Mycosis fungoides involving lymph nodes of axilla (H)       No past medical history on file.  Past Surgical History:   Procedure Laterality Date    APPENDECTOMY      IR LYMPH NODE BIOPSY  7/2/2024     No family history on file.  Social History     Tobacco Use    Smoking status: Never    Smokeless tobacco: Never   Substance Use Topics    Alcohol use: Not on file              The plan of care was discussed with the patient. They understand and agree with the course of treatment prescribed. A printed summary was given including instructions and medications.  The use of Dragon/[employer] dictation services may have been used to construct the content in this note; any grammatical or spelling errors are non-intentional. Please contact the author of this note directly if you are in need of any clarification.

## 2025-05-07 ENCOUNTER — PRE VISIT (OUTPATIENT)
Dept: RADIATION THERAPY | Facility: OUTPATIENT CENTER | Age: 65
End: 2025-05-07

## 2025-05-07 ENCOUNTER — RESULTS FOLLOW-UP (OUTPATIENT)
Dept: DERMATOLOGY | Facility: CLINIC | Age: 65
End: 2025-05-07

## 2025-05-07 ENCOUNTER — HOSPITAL ENCOUNTER (OUTPATIENT)
Dept: LAB | Facility: CLINIC | Age: 65
Discharge: HOME | End: 2025-05-07
Attending: STUDENT IN AN ORGANIZED HEALTH CARE EDUCATION/TRAINING PROGRAM | Admitting: STUDENT IN AN ORGANIZED HEALTH CARE EDUCATION/TRAINING PROGRAM
Payer: COMMERCIAL

## 2025-05-07 ENCOUNTER — OFFICE VISIT (OUTPATIENT)
Dept: RADIATION ONCOLOGY | Facility: CLINIC | Age: 65
End: 2025-05-07
Attending: STUDENT IN AN ORGANIZED HEALTH CARE EDUCATION/TRAINING PROGRAM
Payer: COMMERCIAL

## 2025-05-07 VITALS
RESPIRATION RATE: 16 BRPM | DIASTOLIC BLOOD PRESSURE: 55 MMHG | TEMPERATURE: 98.3 F | HEART RATE: 72 BPM | SYSTOLIC BLOOD PRESSURE: 99 MMHG

## 2025-05-07 VITALS
WEIGHT: 154 LBS | RESPIRATION RATE: 16 BRPM | DIASTOLIC BLOOD PRESSURE: 65 MMHG | SYSTOLIC BLOOD PRESSURE: 96 MMHG | HEART RATE: 68 BPM | BODY MASS INDEX: 24.86 KG/M2

## 2025-05-07 DIAGNOSIS — C84.11: ICD-10-CM

## 2025-05-07 LAB — BACTERIA SPEC CULT: ABNORMAL

## 2025-05-07 PROCEDURE — 250N000009 HC RX 250

## 2025-05-07 PROCEDURE — G0463 HOSPITAL OUTPT CLINIC VISIT: HCPCS | Performed by: STUDENT IN AN ORGANIZED HEALTH CARE EDUCATION/TRAINING PROGRAM

## 2025-05-07 PROCEDURE — 36522 PHOTOPHERESIS: CPT

## 2025-05-07 RX ADMIN — ANTICOAGULANT CITRATE DEXTROSE SOLUTION FORMULA A 215 ML: 12.25; 11; 3.65 SOLUTION INTRAVENOUS at 09:00

## 2025-05-07 NOTE — DISCHARGE INSTRUCTIONS
Multiple views of the right coronary artery. Photopheresis:  Avoid sunlight , and wear UVA-protective, full coverage sunglasses and sunscreen SPF 15 or higher  for 24 hours after your treatment.  The drug used in your treatment makes patients more sensitive to sunlight for about 24 hours after the treatment.

## 2025-05-07 NOTE — PROCEDURES
Transfusion Medicine  Apheresis Procedure Note    Blane Ugalde [id number]   YOB: 1960 Age: 64 year old         Procedure:  Extracorporeal Photopheresis (ECP)           Assessment and Plan:   Blane Ugalde is a 64 year old male with history of cutaneous T cell lymphoma (mycosis fungoides)/Sézary syndrome  is undergoing procedure #2 of 2 for the week.   He tolerated it well.  Recent notes indicate an ear infection, starting some ear drops.              History of Present Illness:   Blane Ugalde is a 64 year old male with past medical history of  hyperlipidemia and a prothrombin gene mutation. He has been followed for rashes and cutaneous symptoms since 2011, but has not had a definitive diagnosis and staging for CTCL or Sézary until a skin biopsy, blood and bone marrow work-up in early 2024 . His first ECP was in March of 2024 ECP and  currently has 2 procedures a month on consecutive days               Past Medical History:   No past medical history on file.  CTCL/Sezary          Past Surgical History:     Past Surgical History:   Procedure Laterality Date    APPENDECTOMY      IR LYMPH NODE BIOPSY  7/2/2024             Allergies:   No Known Allergies          Medications:     Current Outpatient Medications   Medication Sig Dispense Refill    acetaminophen (TYLENOL) 325 MG tablet Take 650 mg by mouth      ciprofloxacin-dexAMETHasone (CIPRODEX) 0.3-0.1 % otic suspension Place 4 drops Into the left ear 2 times daily. 10 mL 0    ferrous sulfate dried 160 (50 Fe) MG tablet Take 1 tablet by mouth daily (with breakfast). 65 mg      hydrOXYzine HCl (ATARAX) 25 MG tablet Take 1-2 tablets (25-50 mg) by mouth 3 times daily as needed for itching. 60 tablet 2    naltrexone (DEPADE/REVIA) 50 MG tablet Take 0.5 tablets (25 mg) by mouth daily. 30 tablet 3    nystatin (MYCOSTATIN) 897875 UNIT/ML suspension Switch 5ml AND SPIT 3-4 times a day. 473 mL 1    omeprazole (PRILOSEC) 10 MG DR capsule Take 20 mg by  mouth daily      triamcinolone (KENALOG) 0.1 % external cream Apply twice a day, avoiding skin folds, face, and groin area. 453.6 g 3    clobetasol (TEMOVATE) 0.05 % external ointment Apply topically 2 times daily. Apply thin layer to rash on the legs twice daily 60 g 4    clobetasol (TEMOVATE) 0.05 % external ointment Apply topically 2 times daily 60 g 3    clobetasol propionate (CLOBEX) 0.05 % external shampoo       folic acid (FOLVITE) 1 MG tablet Take 1 tablet (1 mg) by mouth daily , except on days that you take methotrexate 26 tablet 11    methotrexate 2.5 MG tablet Take 10 tablets (25 mg) by mouth every 7 days . Take the same day of the week. 40 tablet 5    prochlorperazine (COMPAZINE) 10 MG tablet Take 10 mg by mouth.      pseudoePHEDrine (SUDAFED) 30 MG tablet Take 30 mg by mouth      tazarotene (TAZORAC) 0.1 % external cream Apply to areas of thick scaling on hands and feet daily 60 g 3    triamcinolone (KENALOG) 0.1 % external cream APPLY TO AFFECTED AREA 2 TIMES A DAY FOR 10 DAYS TO ARMS, CHEST, NECK, AND BACK       Current Facility-Administered Medications   Medication Dose Route Frequency Provider Last Rate Last Admin    - medication instruction - If heparin contraindicated, use anticoagulant citrate dextrose formula A for prime and procedure.    Does not apply During Apheresis (from stock) Wendie Aragon MD        methoxsalen (photopheresis) SOLN   Apheresis DOES NOT GO TO [facility] Wendie Aragon MD                 Review of Systems:     See above           Exam:   BP 99/55   Pulse 72   Temp 98.3  F (36.8  C) (Oral)   Resp 16   Alert, no apparent distress  All exposed/visible skin appears red.  Breathing appears comfortable on room air  Peripheral IV access for the procedure.             Data:     CBC  Recent Labs   Lab 05/06/25  1105   WBC 5.4   RBC 4.95   HGB 14.7   HCT 43.1   MCV 87   MCH 29.7   MCHC 34.1   RDW 13.2                   Procedure Summary:   Extracorporeal photopheresis was  performed on a [employer] device. Peripheral IV was used for access. ACD-A/saline was used to prime the circuit and ACD-A was used during the procedure for anticoagulation.  The patient's vital signs were stable throughout.  The patient tolerated the procedure well without complication.  See apheresis flowsheet for additional details.         Attestation: During the procedure the patient was directly seen and evaluated by me, Tremayne Gillis MD.  I have reviewed the chart and pertinent laboratory findings, and discussed the patient and the current procedure with the Apheresis nursing staff.    Tremayne Gillis MD  Transfusion Medicine Attending  Laboratory Medicine & Pathology

## 2025-05-07 NOTE — LETTER
2025      Blane Ugalde  [address] [facility] [address]      Dear Colleague,    Thank you for referring your patient, Blane Ugalde, to the [facility] RADIATION ONCOLOGY. Please see a copy of my visit note below.    [facility]  RADIATION ONCOLOGY CONSULTATION NOTE     NAME: Blane Ugalde  :  1960  DATE OF CONSULT: 2025  MRN: [id number]  REFERRING PHYSICIAN: Ezra Rico   DIAGNOSIS & STAGING: Mycosis Fungoides, Sezary syndrome, Stage IVA2 (T4N3B1)    Identification: Mr. Ugalde is a 65 yo M with Mycosis Fungoides with Sezary Syndrome, Stage IVA2 (T4N3B1) who has progressed through multiple lines of treatment including methotrexate, nbUVB, and Mogalizumab. He presents with worsened ulcerating lesions of the scalp and erythroderma and is referred for consideration of TSEBT.    HPI :   Blane Ugalde is a 64 year old male with past medical history of  hyperlipidemia and a prothrombin gene mutation. He has been followed for rashes and cutaneous symptoms since , but has not had a definitive diagnosis and staging for CTCL or Sézary until a skin biopsy, blood and bone marrow work-up in 2024 .    -2024 Skin biopsy, flow cytometry:  Skin biopsy: MF  Flow cytometry: CD4 positive T cell population  - 2024 Started tx with ECP in agreement with oncologist, start methotrexate 25 mg weekly with folic acid  - 24 enlarged but still nominally normal LN in axilla, new inguinal LAD  - 24 Eval'd by onc, enlarged LN noted discussed careful monitoring w/ consideration of future imaging and bx  - 24 after discussion w/ pt they would prefer US of lN for size/structure eval. IR referral for bx L LN. Consider transition from methotrexate to bexarotene or IFN.   - 24 LN + for MF (no LCT). Now stage IVA2   - 24 discussed Ania vs IFN  - 24 2nd opinion w/ [place] who recommended mogamulizumab  - 24 Discussed risks and benefits for treatment  options: methotrexate, interferon,  bexarotine, mogamolizumab, and photopheresis and possible adverse effects. Will speak w/ Karlos  -Peripheral flow with 500 Sezary cells  - 4/22/25 Dr. Everett meeting, discussed [facility] for brentux and bendamustine + TSEBT  -04/28/25 PET scan  enlarged calvarial, parotid, cervical supraclavic ular, axillary, epidtrochlear, inguinal/femoral, poplitearl and hilar LN. High uptate 10.4 SUV parotid LN. Dr. Johnson rec bx  - 04/30/25 Scalp punchx2 w/ CD30 expression estimated at 10 and 3%     Today the patient presents feeling generally pruritic and with diffusely irritated skin. He has significant erythroderma and has noticed greater ulceration of his scalp lesions. Denies fevers/chills/drainage.    All other systems reviewed and are negative.    PAST MEDICAL HISTORY:  No past medical history on file.    SURGICAL HISTORY:  Past Surgical History:   Procedure Laterality Date     APPENDECTOMY       IR LYMPH NODE BIOPSY  7/2/2024       FAMILY HISTORY:  No family history on file.  No history of familial malignancies, bleeding disorders, or other syndromes    SOCIAL HISTORY:   Social History     Socioeconomic History     Marital status:      Spouse name: Not on file     Number of children: Not on file     Years of education: Not on file     Highest education level: Not on file   Occupational History     Not on file   Tobacco Use     Smoking status: Never     Smokeless tobacco: Never   Vaping Use     Vaping status: Never Used   Substance and Sexual Activity     Alcohol use: Not on file     Drug use: Not on file     Sexual activity: Not on file   Other Topics Concern     Not on file   Social History Narrative     Not on file     Social Drivers of Health     Financial Resource Strain: Not on file   Food Insecurity: No Food Insecurity (7/18/2024)    Received from [facility]    Hunger Vital Sign      Worried About Running Out of Food in the Last Year: Never true      Ran Out of Food in  the Last Year: Never true   Transportation Needs: No Transportation Needs (7/18/2024)    Received from [facility]    PRAPARE - Transportation      Lack of Transportation (Medical): No      Lack of Transportation (Non-Medical): No   Physical Activity: Sufficiently Active (7/18/2024)    Received from [facility]    Exercise Vital Sign      Days of Exercise per Week: 5 days      Minutes of Exercise per Session: 150+ min   Stress: Not on file   Social Connections: Not on file   Interpersonal Safety: Not At Risk (10/9/2024)    Received from [facility] SDOH: Intimate Partner Violence      [facility] IPV - Concerns about your personal safety: No   Housing Stability: Low Risk  (7/18/2024)    Received from [facility]    Housing Stability      What is your living situation today?: I have a steady place to live       MEDICATIONS:    Current Outpatient Medications:      acetaminophen (TYLENOL) 325 MG tablet, Take 650 mg by mouth, Disp: , Rfl:      ciprofloxacin-dexAMETHasone (CIPRODEX) 0.3-0.1 % otic suspension, Place 4 drops Into the left ear 2 times daily., Disp: 10 mL, Rfl: 0     clobetasol (TEMOVATE) 0.05 % external ointment, Apply topically 2 times daily. Apply thin layer to rash on the legs twice daily, Disp: 60 g, Rfl: 4     clobetasol (TEMOVATE) 0.05 % external ointment, Apply topically 2 times daily, Disp: 60 g, Rfl: 3     clobetasol propionate (CLOBEX) 0.05 % external shampoo, , Disp: , Rfl:      ferrous sulfate dried 160 (50 Fe) MG tablet, Take 1 tablet by mouth daily (with breakfast). 65 mg, Disp: , Rfl:      folic acid (FOLVITE) 1 MG tablet, Take 1 tablet (1 mg) by mouth daily , except on days that you take methotrexate, Disp: 26 tablet, Rfl: 11     hydrOXYzine HCl (ATARAX) 25 MG tablet, Take 1-2 tablets (25-50 mg) by mouth 3 times daily as needed for itching., Disp: 60 tablet, Rfl: 2     methotrexate 2.5 MG tablet, Take 10 tablets (25 mg) by mouth every 7 days . Take the same day of the week., Disp: 40  tablet, Rfl: 5     naltrexone (DEPADE/REVIA) 50 MG tablet, Take 0.5 tablets (25 mg) by mouth daily., Disp: 30 tablet, Rfl: 3     nystatin (MYCOSTATIN) 219628 UNIT/ML suspension, Switch 5ml AND SPIT 3-4 times a day., Disp: 473 mL, Rfl: 1     omeprazole (PRILOSEC) 10 MG DR capsule, Take 20 mg by mouth daily, Disp: , Rfl:      prochlorperazine (COMPAZINE) 10 MG tablet, Take 10 mg by mouth., Disp: , Rfl:      pseudoePHEDrine (SUDAFED) 30 MG tablet, Take 30 mg by mouth, Disp: , Rfl:      tazarotene (TAZORAC) 0.1 % external cream, Apply to areas of thick scaling on hands and feet daily, Disp: 60 g, Rfl: 3     triamcinolone (KENALOG) 0.1 % external cream, Apply twice a day, avoiding skin folds, face, and groin area., Disp: 453.6 g, Rfl: 3     triamcinolone (KENALOG) 0.1 % external cream, APPLY TO AFFECTED AREA 2 TIMES A DAY FOR 10 DAYS TO ARMS, CHEST, NECK, AND BACK, Disp: , Rfl:   No current facility-administered medications for this visit.    Facility-Administered Medications Ordered in Other Visits:      - medication instruction - If heparin contraindicated, use anticoagulant citrate dextrose formula A for prime and procedure. , , Does not apply, During Apheresis (from stock)Margo Aisha M, MD     methoxsalen (photopheresis) SOLN, , Apheresis, DOES NOT GO TO MARMargo Aisha M, MD    ALLERGIES:  No Known Allergies    REVIEW OF SYSTEMS:    As per HPI; additionally the patient denies fevers, chills, or night sweats and denies any cough, chest pain or chest pressure.  No lower extremity swelling. The remainder of the review of systems was reviewed in detail and was negative    PHYSICAL EXAM:  ECOG**:   0: Fully active  1: Restricted in strenuous activity  2: Restricted in work activity but ambulatory and capable of self-care  3: Capable of limited self-care  4: Completely disabled  VITALS: BP 96/65   Pulse 68   Resp 16   Wt 69.9 kg (154 lb)   BMI 24.86 kg/m    GENERAL: Alert, well-appearing, in NAD.    SKIN:  "Erythroderma present >90% of BSA. Significant lymphadenopathy of cervical and supraclav lymph nodes.  HEENT: NCAT, EOMI, OP with thrush. No Mucositis, good dentition -                 Resp:  Lungs clear to auscultation bilaterally.    CV: Regular rate and rhythm.    GI: Abd soft, non-tender, and non-distended.  No palpable masses.   MSK: No spiny tenderness to palpation.  Extremities:  No pitting edema.   NEURO:     At neurologic baseline  IMAGING STUDIES:    As per studies above      PATHOLOGY:   As above    LABORATORY:    Hematology  Recent Labs   Lab Test 05/06/25  1105 04/03/25  1247 03/05/25  0828 02/10/25  1302 01/13/25  1301 12/16/24  1225 11/18/24  1304 10/21/24  1248 09/24/24  1248 08/26/24  1223 07/29/24  1311 07/01/24  1316 05/28/24  1249 05/01/24  1031 04/04/24  1246 03/06/24  1317   WBC 5.4 5.4 4.7 5.5 6.1 4.6 5.2 4.1 4.5 6.5 5.8 4.6 5.1 5.1 5.3 6.9   HGB 14.7 14.7 14.6 14.1 13.4 13.5 13.4 14.4 13.9 13.2* 13.6 14.2 13.7 14.7 14.2 14.8   HCT 43.1 43.1 43.8 42.7 41.4 41.5 40.6 43.0 41.2 40.0 40.9 41.3 40.1 43.3 41.7 44.0    201 222 207 218 213 214 222 226 281 284 232 242 259 234 278        Coagulation  Recent Labs   Lab Test 07/02/24  1234   INR 0.90       Chemistry  Recent Labs   Lab Test 01/13/25  1301 12/16/24  1225 11/18/24  1304    139 137   CO2 28 26 27   BUN 9.7 10.5 11.2   No results for input(s): \"PHOS\" in the last 49215 hours.    Invalid input(s): \"MG\"  Recent Labs   Lab Test 01/13/25  1301 12/16/24  1225 11/18/24  1304   ALBUMIN 4.0 3.8 3.9   AST 17 21 21   ALT 16 10 16   ALKPHOS 90 95 79       PFT:    RADIATION ONCOLOGY PRE-TREATMENT EVALUATION:   Prior RT: The patient denies any history of prior radiation therapy or exposure to ionizing radiation.   Pacemaker: No  Pregnancy status: N/A  Pain: 5/10  Pain plan: topical treatments  Intent of treatment: (curative/palliative): palliative  Side Effects of Radiation: We reviewed potential short and long-term side effects, and provided " educational materials regarding the self-care of the most common side effects.    ASSESSMENT AND PLAN:   Mr. Ugalde is a 63 yo M with Mycosis Fungoides with Sezary Syndrome, Stage IVA2 (T4N3B1) who has progressed through multiple lines of treatment including methotrexate, nbUVB, and Mogalizumab. He presents with worsened ulcerating lesions of the scalp and diffuse erythroderma and is referred for consideration of TSEBT.    We discussed our multidisciplinary recommendation for total skin electron therapy in order to address his symptoms prior to planned next-line systemic treatment. I focused my meeting with Mr. Ugalde on the role of radiation in the management of his disease. We had a discussion regarding the rationale of, logistics of, benefits of, alternatives to, and risks of radiation, both short and long term, as noted on the consent form. I emphasized that the risk of severe skin toxicity is higher in the setting of erythroderma and discussed skin care during and after radiation.    After a discussion regarding the plan of care above, Mr. Ugalde expressed an interest in proceeding with RT. We will plan for TSEBT to start next week.     I personally reviewed the available images. Laboratory data and pathology as noted above was reviewed. I reviewed previous medical records, which are summarized in the HPI. A total of 60 minutes were spent (including face to face time) during this visit.    Carlos Kelley M.D.  [facility]     Department of Radiation Oncology    Cc:  Ezra Rico MD  [address] [place] [address]    Rush Johnson       Oncology Rooming Note    May 7, 2025 12:28 PM   Blane Ugalde is a 64 year old male who presents for:    Chief Complaint   Patient presents with     Oncology Clinic Visit     Consult     Initial Vitals: /62   Pulse 68   Resp 16   Wt 69.9 kg (154 lb)   BMI 24.86 kg/m   Estimated body mass index is 24.86 kg/m  as  "calculated from the following:    Height as of 4/22/25: 1.676 m (5' 6\").    Weight as of this encounter: 69.9 kg (154 lb). Body surface area is 1.8 meters squared.  Data Unavailable Comment: Data Unavailable   No LMP for male patient.  Allergies reviewed: Yes  Medications reviewed: Yes    Medications: Medication refills not needed today.  Pharmacy name entered into EPIC:    [id number] - [place], [place] - [address] [id number] - [place], [place] - [address] [facility] [address]    Frailty Screening:   Is the patient here for a new oncology consult visit in cancer care? 2. No    PHQ9:  Did this patient require a PHQ9?: No      Clinical concerns: Consult Dr Dumont was notified.      Rona Sauceda RN      Considerations for radiation treatment   Pregnancy status: Male   Implanted Cardiac Devices: No   Any previous radiation therapy: No          Again, thank you for allowing me to participate in the care of your patient.        Sincerely,        Carlos Kelley MD    Electronically signed"

## 2025-05-07 NOTE — PROGRESS NOTES
"Oncology Rooming Note    May 7, 2025 12:28 PM   Blane Ugalde is a 64 year old male who presents for:    Chief Complaint   Patient presents with    Oncology Clinic Visit     Consult     Initial Vitals: /62   Pulse 68   Resp 16   Wt 69.9 kg (154 lb)   BMI 24.86 kg/m   Estimated body mass index is 24.86 kg/m  as calculated from the following:    Height as of 4/22/25: 1.676 m (5' 6\").    Weight as of this encounter: 69.9 kg (154 lb). Body surface area is 1.8 meters squared.  Data Unavailable Comment: Data Unavailable   No LMP for male patient.  Allergies reviewed: Yes  Medications reviewed: Yes    Medications: Medication refills not needed today.  Pharmacy name entered into EPIC:    [id number] - [place], [place] - [address] [id number] - [place], [place] - [address] AT [facility] [address]    Frailty Screening:   Is the patient here for a new oncology consult visit in cancer care? 2. No    PHQ9:  Did this patient require a PHQ9?: No      Clinical concerns: Consult Dr Dumont was notified.      Rona Sauceda, RN      Considerations for radiation treatment   Pregnancy status: Male   Implanted Cardiac Devices: No   Any previous radiation therapy: No        "

## 2025-05-07 NOTE — TELEPHONE ENCOUNTER
MEDICAL RECORDS REQUEST   Radiation Oncology  [address] [place] [address]  Fax: [phone]          FUTURE VISIT INFORMATION                                                   Blane MATTHEW Tram, : 1960 scheduled for future visit at [facility] Radiation Oncology    RECORDS REQUESTED FOR VISIT                                                     HEAD & NECK     OFFICE NOTE from derm [facility] 25: Dr. Ezra Rico   OFFICE NOTE from medical oncologist [facility]/ Epic 25: Dr. Rush Johnson    25: Dr. Sabiha Everett   OPERATIVE/BIOPSY REPORTS     MEDICATION LIST [facility]    LABS     PATHOLOGY REPORTS     ANYTHING RELATED TO DIAGNOSIS Epic Most recent 25   IMAGING (NEED IMAGES & REPORT)     CT SCANS PACS 25, 25: CT Soft Tissue Neck  4/15/25, 25: CT CAP   PET PACS 25: PET Whole Body   PREVIOUS RADIATION     [facility]   RADIATION ONCOLOGIST NOTES [facility] 4/15/25: Dr. Alek Alegre  (Consult Only)   RADIATION TREATMENT SUMMARY NOTES     RADIATION TREATMENT PLAN     DVH = DOSE VOLUME HISTOGRAM     DICOM CD (TX PLANNING CT, TX PLAN, STRUCTURE SET) *If no DICOM avail ask for DRLaura     *[facility] Radiation On[facility] patients send to [facility] with ATTN: TYLOR/Sam Dosi/Physics    *only [facility] patient's, use [facility]

## 2025-05-07 NOTE — PROGRESS NOTES
[facility]  RADIATION ONCOLOGY CONSULTATION NOTE     NAME: Blane Ugalde  :  1960  DATE OF CONSULT: 2025  MRN: [id number]  REFERRING PHYSICIAN: Ezra Rico   DIAGNOSIS & STAGING: Mycosis Fungoides, Sezary syndrome, Stage IVA2 (T4N3B1)    Identification: Mr. Ugalde is a 65 yo M with Mycosis Fungoides with Sezary Syndrome, Stage IVA2 (T4N3B1) who has progressed through multiple lines of treatment including methotrexate, nbUVB, and Mogalizumab. He presents with worsened ulcerating lesions of the scalp and erythroderma and is referred for consideration of TSEBT.    HPI :   Blane Ugalde is a 64 year old male with past medical history of  hyperlipidemia and a prothrombin gene mutation. He has been followed for rashes and cutaneous symptoms since , but has not had a definitive diagnosis and staging for CTCL or Sézary until a skin biopsy, blood and bone marrow work-up in 2024 .    -2024 Skin biopsy, flow cytometry:  Skin biopsy: MF  Flow cytometry: CD4 positive T cell population  - 2024 Started tx with ECP in agreement with oncologist, start methotrexate 25 mg weekly with folic acid  - 24 enlarged but still nominally normal LN in axilla, new inguinal LAD  - 24 Eval'd by onc, enlarged LN noted discussed careful monitoring w/ consideration of future imaging and bx  - 24 after discussion w/ pt they would prefer US of lN for size/structure eval. IR referral for bx L LN. Consider transition from methotrexate to bexarotene or IFN.   - 24 LN + for MF (no LCT). Now stage IVA2   - 24 discussed Ania vs IFN  - 24 2nd opinion w/ Beebe who recommended mogamulizumab  - 24 Discussed risks and benefits for treatment options: methotrexate, interferon,  bexarotine, mogamolizumab, and photopheresis and possible adverse effects. Will speak w/ Karlos  -Peripheral flow with 500 Sezary cells  - 25 Dr. Everett meeting, discussed Beebe rec for brentux and  bendamustine + TSEBT  -04/28/25 PET scan  enlarged calvarial, parotid, cervical supraclavic ular, axillary, epidtrochlear, inguinal/femoral, poplitearl and hilar LN. High uptate 10.4 SUV parotid LN. Dr. Johnson rec bx  - 04/30/25 Scalp punchx2 w/ CD30 expression estimated at 10 and 3%     Today the patient presents feeling generally pruritic and with diffusely irritated skin. He has significant erythroderma and has noticed greater ulceration of his scalp lesions. Denies fevers/chills/drainage.    All other systems reviewed and are negative.    PAST MEDICAL HISTORY:  No past medical history on file.    SURGICAL HISTORY:  Past Surgical History:   Procedure Laterality Date    APPENDECTOMY      IR LYMPH NODE BIOPSY  7/2/2024       FAMILY HISTORY:  No family history on file.  No history of familial malignancies, bleeding disorders, or other syndromes    SOCIAL HISTORY:   Social History     Socioeconomic History    Marital status:      Spouse name: Not on file    Number of children: Not on file    Years of education: Not on file    Highest education level: Not on file   Occupational History    Not on file   Tobacco Use    Smoking status: Never    Smokeless tobacco: Never   Vaping Use    Vaping status: Never Used   Substance and Sexual Activity    Alcohol use: Not on file    Drug use: Not on file    Sexual activity: Not on file   Other Topics Concern    Not on file   Social History Narrative    Not on file     Social Drivers of Health     Financial Resource Strain: Not on file   Food Insecurity: No Food Insecurity (7/18/2024)    Received from [facility]    Hunger Vital Sign     Worried About Running Out of Food in the Last Year: Never true     Ran Out of Food in the Last Year: Never true   Transportation Needs: No Transportation Needs (7/18/2024)    Received from [facility]    PRAPARE - Transportation     Lack of Transportation (Medical): No     Lack of Transportation (Non-Medical): No   Physical Activity:  Sufficiently Active (7/18/2024)    Received from [facility]    Exercise Vital Sign     Days of Exercise per Week: 5 days     Minutes of Exercise per Session: 150+ min   Stress: Not on file   Social Connections: Not on file   Interpersonal Safety: Not At Risk (10/9/2024)    Received from [facility] SDOH: Intimate Partner Violence     [facility] IPV - Concerns about your personal safety: No   Housing Stability: Low Risk  (7/18/2024)    Received from [facility]    Housing Stability     What is your living situation today?: I have a steady place to live       MEDICATIONS:    Current Outpatient Medications:     acetaminophen (TYLENOL) 325 MG tablet, Take 650 mg by mouth, Disp: , Rfl:     ciprofloxacin-dexAMETHasone (CIPRODEX) 0.3-0.1 % otic suspension, Place 4 drops Into the left ear 2 times daily., Disp: 10 mL, Rfl: 0    clobetasol (TEMOVATE) 0.05 % external ointment, Apply topically 2 times daily. Apply thin layer to rash on the legs twice daily, Disp: 60 g, Rfl: 4    clobetasol (TEMOVATE) 0.05 % external ointment, Apply topically 2 times daily, Disp: 60 g, Rfl: 3    clobetasol propionate (CLOBEX) 0.05 % external shampoo, , Disp: , Rfl:     ferrous sulfate dried 160 (50 Fe) MG tablet, Take 1 tablet by mouth daily (with breakfast). 65 mg, Disp: , Rfl:     folic acid (FOLVITE) 1 MG tablet, Take 1 tablet (1 mg) by mouth daily , except on days that you take methotrexate, Disp: 26 tablet, Rfl: 11    hydrOXYzine HCl (ATARAX) 25 MG tablet, Take 1-2 tablets (25-50 mg) by mouth 3 times daily as needed for itching., Disp: 60 tablet, Rfl: 2    methotrexate 2.5 MG tablet, Take 10 tablets (25 mg) by mouth every 7 days . Take the same day of the week., Disp: 40 tablet, Rfl: 5    naltrexone (DEPADE/REVIA) 50 MG tablet, Take 0.5 tablets (25 mg) by mouth daily., Disp: 30 tablet, Rfl: 3    nystatin (MYCOSTATIN) 208942 UNIT/ML suspension, Switch 5ml AND SPIT 3-4 times a day., Disp: 473 mL, Rfl: 1    omeprazole (PRILOSEC) 10 MG   capsule, Take 20 mg by mouth daily, Disp: , Rfl:     prochlorperazine (COMPAZINE) 10 MG tablet, Take 10 mg by mouth., Disp: , Rfl:     pseudoePHEDrine (SUDAFED) 30 MG tablet, Take 30 mg by mouth, Disp: , Rfl:     tazarotene (TAZORAC) 0.1 % external cream, Apply to areas of thick scaling on hands and feet daily, Disp: 60 g, Rfl: 3    triamcinolone (KENALOG) 0.1 % external cream, Apply twice a day, avoiding skin folds, face, and groin area., Disp: 453.6 g, Rfl: 3    triamcinolone (KENALOG) 0.1 % external cream, APPLY TO AFFECTED AREA 2 TIMES A DAY FOR 10 DAYS TO ARMS, CHEST, NECK, AND BACK, Disp: , Rfl:   No current facility-administered medications for this visit.    Facility-Administered Medications Ordered in Other Visits:     - medication instruction - If heparin contraindicated, use anticoagulant citrate dextrose formula A for prime and procedure. , , Does not apply, During Apheresis (from stock)Margo Aisha M, MD    methoxsalen (photopheresis) SOLN, , Apheresis, DOES NOT GO TO MARMargo Aisha M, MD    ALLERGIES:  No Known Allergies    REVIEW OF SYSTEMS:    As per HPI; additionally the patient denies fevers, chills, or night sweats and denies any cough, chest pain or chest pressure.  No lower extremity swelling. The remainder of the review of systems was reviewed in detail and was negative    PHYSICAL EXAM:  ECOG**:   0: Fully active  1: Restricted in strenuous activity  2: Restricted in work activity but ambulatory and capable of self-care  3: Capable of limited self-care  4: Completely disabled  VITALS: BP 96/65   Pulse 68   Resp 16   Wt 69.9 kg (154 lb)   BMI 24.86 kg/m    GENERAL: Alert, well-appearing, in NAD.    SKIN: Erythroderma present >90% of BSA. Significant lymphadenopathy of cervical and supraclav lymph nodes.  HEENT: NCAT, EOMI, OP with thrush. No Mucositis, good dentition -                 Resp:  Lungs clear to auscultation bilaterally.    CV: Regular rate and rhythm.    GI: Abd  "soft, non-tender, and non-distended.  No palpable masses.   MSK: No spiny tenderness to palpation.  Extremities:  No pitting edema.   NEURO:     At neurologic baseline  IMAGING STUDIES:    As per studies above      PATHOLOGY:   As above    LABORATORY:    Hematology  Recent Labs   Lab Test 05/06/25  1105 04/03/25  1247 03/05/25  0828 02/10/25  1302 01/13/25  1301 12/16/24  1225 11/18/24  1304 10/21/24  1248 09/24/24  1248 08/26/24  1223 07/29/24  1311 07/01/24  1316 05/28/24  1249 05/01/24  1031 04/04/24  1246 03/06/24  1317   WBC 5.4 5.4 4.7 5.5 6.1 4.6 5.2 4.1 4.5 6.5 5.8 4.6 5.1 5.1 5.3 6.9   HGB 14.7 14.7 14.6 14.1 13.4 13.5 13.4 14.4 13.9 13.2* 13.6 14.2 13.7 14.7 14.2 14.8   HCT 43.1 43.1 43.8 42.7 41.4 41.5 40.6 43.0 41.2 40.0 40.9 41.3 40.1 43.3 41.7 44.0    201 222 207 218 213 214 222 226 281 284 232 242 259 234 278        Coagulation  Recent Labs   Lab Test 07/02/24  1234   INR 0.90       Chemistry  Recent Labs   Lab Test 01/13/25  1301 12/16/24  1225 11/18/24  1304    139 137   CO2 28 26 27   BUN 9.7 10.5 11.2   No results for input(s): \"PHOS\" in the last 04652 hours.    Invalid input(s): \"MG\"  Recent Labs   Lab Test 01/13/25  1301 12/16/24  1225 11/18/24  1304   ALBUMIN 4.0 3.8 3.9   AST 17 21 21   ALT 16 10 16   ALKPHOS 90 95 79       PFT:    RADIATION ONCOLOGY PRE-TREATMENT EVALUATION:   Prior RT: The patient denies any history of prior radiation therapy or exposure to ionizing radiation.   Pacemaker: No  Pregnancy status: N/A  Pain: 5/10  Pain plan: topical treatments  Intent of treatment: (curative/palliative): palliative  Side Effects of Radiation: We reviewed potential short and long-term side effects, and provided educational materials regarding the self-care of the most common side effects.    ASSESSMENT AND PLAN:   Mr. Ugalde is a 65 yo M with Mycosis Fungoides with Sezary Syndrome, Stage IVA2 (T4N3B1) who has progressed through multiple lines of treatment including methotrexate, " nbUVB, and Mogalizumab. He presents with worsened ulcerating lesions of the scalp and diffuse erythroderma and is referred for consideration of TSEBT.    We discussed our multidisciplinary recommendation for total skin electron therapy in order to address his symptoms prior to planned next-line systemic treatment. I focused my meeting with Mr. Ugalde on the role of radiation in the management of his disease. We had a discussion regarding the rationale of, logistics of, benefits of, alternatives to, and risks of radiation, both short and long term, as noted on the consent form. I emphasized that the risk of severe skin toxicity is higher in the setting of erythroderma and discussed skin care during and after radiation.    After a discussion regarding the plan of care above, Mr. Ugalde expressed an interest in proceeding with RT. We will plan for TSEBT to start next week.     I personally reviewed the available images. Laboratory data and pathology as noted above was reviewed. I reviewed previous medical records, which are summarized in the HPI. A total of 60 minutes were spent (including face to face time) during this visit.    Carlos Kelley M.D.  [facility]     Department of Radiation Oncology    Cc:  Ezra Rico MD  [address] [place] [id number]    Rush Johnson

## 2025-05-10 LAB — BACTERIA SPEC CULT: ABNORMAL

## 2025-05-13 ENCOUNTER — APPOINTMENT (OUTPATIENT)
Dept: RADIATION ONCOLOGY | Facility: CLINIC | Age: 65
End: 2025-05-13
Attending: STUDENT IN AN ORGANIZED HEALTH CARE EDUCATION/TRAINING PROGRAM
Payer: COMMERCIAL

## 2025-05-13 PROCEDURE — 77290 THER RAD SIMULAJ FIELD CPLX: CPT | Performed by: RADIOLOGY

## 2025-05-13 PROCEDURE — 77412 RADIATION TX DELIVERY LVL 3: CPT | Performed by: RADIOLOGY

## 2025-05-13 PROCEDURE — 77290 THER RAD SIMULAJ FIELD CPLX: CPT | Mod: 26 | Performed by: STUDENT IN AN ORGANIZED HEALTH CARE EDUCATION/TRAINING PROGRAM

## 2025-05-14 ENCOUNTER — TELEPHONE (OUTPATIENT)
Dept: OTOLARYNGOLOGY | Facility: CLINIC | Age: 65
End: 2025-05-14
Payer: COMMERCIAL

## 2025-05-14 ENCOUNTER — OFFICE VISIT (OUTPATIENT)
Dept: RADIATION ONCOLOGY | Facility: CLINIC | Age: 65
End: 2025-05-14
Attending: STUDENT IN AN ORGANIZED HEALTH CARE EDUCATION/TRAINING PROGRAM
Payer: COMMERCIAL

## 2025-05-14 VITALS
WEIGHT: 158.1 LBS | BODY MASS INDEX: 25.52 KG/M2 | HEART RATE: 70 BPM | SYSTOLIC BLOOD PRESSURE: 117 MMHG | DIASTOLIC BLOOD PRESSURE: 71 MMHG

## 2025-05-14 DIAGNOSIS — C84.11: Primary | ICD-10-CM

## 2025-05-14 PROCEDURE — 77412 RADIATION TX DELIVERY LVL 3: CPT | Performed by: STUDENT IN AN ORGANIZED HEALTH CARE EDUCATION/TRAINING PROGRAM

## 2025-05-14 PROCEDURE — 77470 SPECIAL RADIATION TREATMENT: CPT | Mod: 26 | Performed by: STUDENT IN AN ORGANIZED HEALTH CARE EDUCATION/TRAINING PROGRAM

## 2025-05-14 PROCEDURE — 77470 SPECIAL RADIATION TREATMENT: CPT | Performed by: STUDENT IN AN ORGANIZED HEALTH CARE EDUCATION/TRAINING PROGRAM

## 2025-05-14 NOTE — LETTER
2025      Blane Ugalde  [address] [facility] [address]      Dear Colleague,    Thank you for referring your patient, Blane Ugalde, to the [facility] RADIATION ONCOLOGY. Please see a copy of my visit note below.    RADIATION ONCOLOGY WEEKLY ON TREATMENT VISIT   Encounter Date: May 14, 2025     Patient Name: Blane Ugalde  MRN: [id number]  : 1960     Disease and Stage: Mycosis Fungoides, Stage IV  Treatment Site: Total Skin  Current Dose/Planned Total Dose: 300 / 1200 cGy  Current Fraction/Planned Total fractions: 2 / 8  Concurrent Chemotherapy: No        ED visits/Hospitalizations:  None    Missed Treatments:  None    Subjective: Mr. Ugalde presents to clinic today for his first weekly on-treatment visit. He notes that his right neck lymphadenopathy has worsened over the weekend (prior to treatment start) and notes a swollen neck. No shortness of breath/dysphagia. He has also noticed worsened pain in that area. His oncology team has prescribed a steroid taper to address swelling but he has not yet initiated this. He states that ibuprofen has helped with pain but he did not take it today and the pain is worse.      Objective:   /71   Pulse 70   Wt 71.7 kg (158 lb 1.6 oz)   BMI 25.52 kg/m     KPS 90  ECOG 1  Pain Score Data Unavailable/10  General: Alert and in no acute distress.  HEENT: Normocephalic, atraumatic. No visible scleral icterus.  Neck: Apparent full range of motion. Palpable firm lymphadenopathy of the right cervical neck and right temple, worsened from prior appointment  CV: Appears well-perfused, with no visible cyanosis.  Lungs: Breathing easily on room air, with no difficulty completing full sentences  Extremities:  No visible edema of the upper extremities.   Neuro: Alert and oriented; grossly nonfocal. Normal speech. Moving upper and lower extremities equally.  Gait within normal limits  Skin: Diffuse erythroderma. Slight ulceration of right temple  lesion.  Psych: Mood and affect are appropriate to given situation. Answers questions appropriately.        Treatment-related toxicities (CTCAE v5.0):  Fatigue: Grade 0: No toxicity    Assessment:    Mr. Ugalde is a 65 yo M with Mycosis Fungoides with Sezary Syndrome, Stage IVA2 (T4N3B1) who has progressed through multiple lines of treatment including methotrexate, nbUVB, and Mogalizumab. 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 We discussed the option of ED evaluation for CT imaging to rule out compressive sequelae of lymphadenopathy, which the patient declined. He will start steroids per his medical team and we discussed that this will likely address lymphadenopathy and pain during treatment and temporize these symptoms prior to starting systemic therapy after TSEBT is finished. We discussed that should he continue to progress while on steroids, we may need to consider a boost dose of radiation to the right neck and temple to palliate progression at this area.  To initiate steroids per medical team.    Pain management:  The patient reports pain relief with ibuprofen; steroids may additionally help with pain from lymphadenopathy. We will continue to assess and may consider escalating pain medications as needed.    Dermatitis:  Counseled on skin care. Pt using aquaphor and Cerave.    Mosaiq chart and setup information reviewed         Carlos Kelley MD     Department of Radiation Oncology  [facility]        Again, thank you for allowing me to participate in the care of your patient.        Sincerely,        Carlos Kelley MD    Electronically signed

## 2025-05-14 NOTE — PROGRESS NOTES
RADIATION ONCOLOGY WEEKLY ON TREATMENT VISIT   Encounter Date: May 14, 2025     Patient Name: Blane Ugalde  MRN: [id number]  : 1960     Disease and Stage: Mycosis Fungoides, Stage IV  Treatment Site: Total Skin  Current Dose/Planned Total Dose: 300 / 1200 cGy  Current Fraction/Planned Total fractions: 2 / 8  Concurrent Chemotherapy: No        ED visits/Hospitalizations:  None    Missed Treatments:  None    Subjective: Mr. Ugalde presents to clinic today for his first weekly on-treatment visit. He notes that his right neck lymphadenopathy has worsened over the weekend (prior to treatment start) and notes a swollen neck. No shortness of breath/dysphagia. He has also noticed worsened pain in that area. His oncology team has prescribed a steroid taper to address swelling but he has not yet initiated this. He states that ibuprofen has helped with pain but he did not take it today and the pain is worse.      Objective:   /71   Pulse 70   Wt 71.7 kg (158 lb 1.6 oz)   BMI 25.52 kg/m     KPS 90  ECOG 1  Pain Score Data Unavailable/10  General: Alert and in no acute distress.  HEENT: Normocephalic, atraumatic. No visible scleral icterus.  Neck: Apparent full range of motion. Palpable firm lymphadenopathy of the right cervical neck and right temple, worsened from prior appointment  CV: Appears well-perfused, with no visible cyanosis.  Lungs: Breathing easily on room air, with no difficulty completing full sentences  Extremities:  No visible edema of the upper extremities.   Neuro: Alert and oriented; grossly nonfocal. Normal speech. Moving upper and lower extremities equally.  Gait within normal limits  Skin: Diffuse erythroderma. Slight ulceration of right temple lesion.  Psych: Mood and affect are appropriate to given situation. Answers questions appropriately.        Treatment-related toxicities (CTCAE v5.0):  Fatigue: Grade 0: No toxicity    Assessment:    Mr. Ugalde is a 65 yo M with Mycosis  Fungoides with Sezary Syndrome, Stage IVA2 (T4N3B1) who has progressed through multiple lines of treatment including methotrexate, nbUVB, and Mogalizumab. 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 We discussed the option of ED evaluation for CT imaging to rule out compressive sequelae of lymphadenopathy, which the patient declined. He will start steroids per his medical team and we discussed that this will likely address lymphadenopathy and pain during treatment and temporize these symptoms prior to starting systemic therapy after TSEBT is finished. We discussed that should he continue to progress while on steroids, we may need to consider a boost dose of radiation to the right neck and temple to palliate progression at this area.  To initiate steroids per medical team.    Pain management:  The patient reports pain relief with ibuprofen; steroids may additionally help with pain from lymphadenopathy. We will continue to assess and may consider escalating pain medications as needed.    Dermatitis:  Counseled on skin care. Pt using aquaphor and Cerave.    Mosaiq chart and setup information reviewed         Carlos Kelley MD     Department of Radiation Oncology  [facility]

## 2025-05-15 ENCOUNTER — APPOINTMENT (OUTPATIENT)
Dept: RADIATION ONCOLOGY | Facility: CLINIC | Age: 65
End: 2025-05-15
Attending: STUDENT IN AN ORGANIZED HEALTH CARE EDUCATION/TRAINING PROGRAM
Payer: COMMERCIAL

## 2025-05-15 PROCEDURE — 77412 RADIATION TX DELIVERY LVL 3: CPT | Performed by: STUDENT IN AN ORGANIZED HEALTH CARE EDUCATION/TRAINING PROGRAM

## 2025-05-15 NOTE — TELEPHONE ENCOUNTER
REFERRAL INFORMATION:  Referring By:   Referring Clinic:   Reason for Visit/Diagnosis: Parotid lymphadenopathy referred      FUTURE VISIT INFORMATION:  Appointment Date: 6/4/25  Appointment Time: 1:20 PM     NOTES STATUS DETAILS   OFFICE NOTE from referring provider [facility] DERMATOLOGY   5/6/25: Ezra Rico MD    OFFICE NOTE from other specialist [facility]    Care Everywhere  [facility] RADIATION ONCOLOGY   5/14/25: Carlos Kelley MD     [facility] Oncology   5/2/25: Rush Johnson MD     HEAD & NECK TUMOR      PATHOLOGY REPORTS     PATHOLOGY SLIDES TRACKING   Tracking #:    [facility]  4/30/2025 Pathology Consult: [id number]   Specimen:    Consult Slide, [id number]    IMAGES *pertaining images & report*       CT, MRI, PET, NM, US, XRAYS, etc ...    IMAGING  DISC TRACKING   Tracking #:    PACS [facility]  4/28/25 NM PET CT  4/17/25 CT Neck  4/15/25 CT chest abd pel  *additional images in PACS

## 2025-05-16 ENCOUNTER — APPOINTMENT (OUTPATIENT)
Dept: RADIATION ONCOLOGY | Facility: CLINIC | Age: 65
End: 2025-05-16
Attending: STUDENT IN AN ORGANIZED HEALTH CARE EDUCATION/TRAINING PROGRAM
Payer: COMMERCIAL

## 2025-05-16 PROCEDURE — 77412 RADIATION TX DELIVERY LVL 3: CPT | Performed by: STUDENT IN AN ORGANIZED HEALTH CARE EDUCATION/TRAINING PROGRAM

## 2025-05-19 ENCOUNTER — APPOINTMENT (OUTPATIENT)
Dept: RADIATION ONCOLOGY | Facility: CLINIC | Age: 65
End: 2025-05-19
Attending: STUDENT IN AN ORGANIZED HEALTH CARE EDUCATION/TRAINING PROGRAM
Payer: COMMERCIAL

## 2025-05-19 PROCEDURE — 77290 THER RAD SIMULAJ FIELD CPLX: CPT | Mod: 26 | Performed by: STUDENT IN AN ORGANIZED HEALTH CARE EDUCATION/TRAINING PROGRAM

## 2025-05-19 PROCEDURE — 77427 RADIATION TX MANAGEMENT X5: CPT | Performed by: STUDENT IN AN ORGANIZED HEALTH CARE EDUCATION/TRAINING PROGRAM

## 2025-05-19 PROCEDURE — 77412 RADIATION TX DELIVERY LVL 3: CPT | Performed by: STUDENT IN AN ORGANIZED HEALTH CARE EDUCATION/TRAINING PROGRAM

## 2025-05-19 PROCEDURE — 77336 RADIATION PHYSICS CONSULT: CPT | Performed by: STUDENT IN AN ORGANIZED HEALTH CARE EDUCATION/TRAINING PROGRAM

## 2025-05-19 PROCEDURE — 77290 THER RAD SIMULAJ FIELD CPLX: CPT | Performed by: STUDENT IN AN ORGANIZED HEALTH CARE EDUCATION/TRAINING PROGRAM

## 2025-05-20 ENCOUNTER — APPOINTMENT (OUTPATIENT)
Dept: RADIATION ONCOLOGY | Facility: CLINIC | Age: 65
End: 2025-05-20
Attending: STUDENT IN AN ORGANIZED HEALTH CARE EDUCATION/TRAINING PROGRAM
Payer: COMMERCIAL

## 2025-05-20 PROCEDURE — 77300 RADIATION THERAPY DOSE PLAN: CPT | Performed by: STUDENT IN AN ORGANIZED HEALTH CARE EDUCATION/TRAINING PROGRAM

## 2025-05-20 PROCEDURE — 77332 RADIATION TREATMENT AID(S): CPT | Mod: 26 | Performed by: STUDENT IN AN ORGANIZED HEALTH CARE EDUCATION/TRAINING PROGRAM

## 2025-05-20 PROCEDURE — 77300 RADIATION THERAPY DOSE PLAN: CPT | Mod: 26 | Performed by: STUDENT IN AN ORGANIZED HEALTH CARE EDUCATION/TRAINING PROGRAM

## 2025-05-20 PROCEDURE — 77332 RADIATION TREATMENT AID(S): CPT | Performed by: STUDENT IN AN ORGANIZED HEALTH CARE EDUCATION/TRAINING PROGRAM

## 2025-05-20 PROCEDURE — 77412 RADIATION TX DELIVERY LVL 3: CPT | Performed by: STUDENT IN AN ORGANIZED HEALTH CARE EDUCATION/TRAINING PROGRAM

## 2025-05-21 ENCOUNTER — APPOINTMENT (OUTPATIENT)
Dept: RADIATION ONCOLOGY | Facility: CLINIC | Age: 65
End: 2025-05-21
Attending: STUDENT IN AN ORGANIZED HEALTH CARE EDUCATION/TRAINING PROGRAM
Payer: COMMERCIAL

## 2025-05-21 PROCEDURE — 77412 RADIATION TX DELIVERY LVL 3: CPT | Performed by: STUDENT IN AN ORGANIZED HEALTH CARE EDUCATION/TRAINING PROGRAM

## 2025-05-22 ENCOUNTER — OFFICE VISIT (OUTPATIENT)
Dept: RADIATION ONCOLOGY | Facility: CLINIC | Age: 65
End: 2025-05-22
Attending: STUDENT IN AN ORGANIZED HEALTH CARE EDUCATION/TRAINING PROGRAM
Payer: COMMERCIAL

## 2025-05-22 VITALS
BODY MASS INDEX: 25.5 KG/M2 | DIASTOLIC BLOOD PRESSURE: 69 MMHG | HEART RATE: 60 BPM | SYSTOLIC BLOOD PRESSURE: 110 MMHG | WEIGHT: 158 LBS

## 2025-05-22 DIAGNOSIS — C84.11: Primary | ICD-10-CM

## 2025-05-22 PROCEDURE — 77412 RADIATION TX DELIVERY LVL 3: CPT | Performed by: STUDENT IN AN ORGANIZED HEALTH CARE EDUCATION/TRAINING PROGRAM

## 2025-05-22 PROCEDURE — 77336 RADIATION PHYSICS CONSULT: CPT | Performed by: STUDENT IN AN ORGANIZED HEALTH CARE EDUCATION/TRAINING PROGRAM

## 2025-05-22 PROCEDURE — 77427 RADIATION TX MANAGEMENT X5: CPT | Mod: GC | Performed by: STUDENT IN AN ORGANIZED HEALTH CARE EDUCATION/TRAINING PROGRAM

## 2025-05-22 NOTE — LETTER
2025      Blane Ugalde  [address] [facility] [address]      Dear Colleague,    Thank you for referring your patient, Blane Ugalde, to the [facility] RADIATION ONCOLOGY. Please see a copy of my visit note below.    RADIATION ONCOLOGY WEEKLY ON TREATMENT VISIT   Encounter Date: May 22, 2025     Patient Name: Blane Ugalde  MRN: [id number]  : 1960     Disease and Stage: Mycosis Fungoides, Stage IV  Treatment Site: Total Skin  Current Dose/Planned Total Dose: 1200 / 1200 cGy  Current Fraction/Planned Total fractions: 8 / 8  Concurrent Chemotherapy: No    ED visits/Hospitalizations:  None    Missed Treatments:  None    Subjective: Mr. Ugalde presents to clinic today for his last weekly on-treatment visit.  He was prescribed steroid medication by his medical oncology team last week.  Since then the right neck lymph adenopathy has improved considerably.  The swelling on the side of his head has gone down.  It still continues to weep some on his pillow at night.  There is little pain, but it is tender when wearing a hat.  He has finished treatment today.  He reports fatigue, stating that he works outside still.  He has a sun at, and asks if he can be outside working.    Objective:   /69   Pulse 60   Wt 71.7 kg (158 lb)   BMI 25.50 kg/m     KPS 90  ECOG 1  Pain Score Data Unavailable/10  General: Alert and in no acute distress.  HEENT: Normocephalic, atraumatic. No visible scleral icterus.  Neck: Apparent full range of motion. Palpable firm lymphadenopathy of the right cervical neck and right temple, improved significantly from prior appointment  CV: Appears well-perfused, with no visible cyanosis.  Lungs: Breathing easily on room air, with no difficulty completing full sentences  Extremities:  No visible edema of the upper extremities.   Neuro: Alert and oriented; grossly nonfocal. Normal speech. Moving upper and lower extremities equally.  Gait within normal limits  Skin:  Diffuse erythroderma. Slight ulceration of right temple lesion.  Psych: Mood and affect are appropriate to given situation. Answers questions appropriately.        Nursing ROS:   Nutrition Alteration  Diet Type: Patient's Preference  Skin  Skin Reaction: 1 - Faint erythema or dry desquamation  Skin Note: Baseline redness     ENT and Mouth Exam  Mucositis - Current: 0 - None   Cardiovascular  Respiratory effort: 1 - Normal - without distress  Gastrointestinal  Nausea: 0 - None     Psychosocial  Mood - Anxiety: 1 - Mild mood alteration  Pain Assessment  Pain Descriptors: Aching, Discomfort  Pain Note: Improved swelling right side of face      Treatment-related toxicities (CTCAE v5.0):  Fatigue: Grade 1: Fatigue relieved by rest  Pain: Grade 1: Mild pain  Dermatitis: Grade 2: Moderate to brisk erythema; patchy moist desquamation, mostly confined to skin folds and creases; moderate erythema    Assessment:    Mr. Ugalde is a 63 yo M with Mycosis Fungoides with Sezary Syndrome, Stage IVA2 (T4N3B1) who has progressed through multiple lines of treatment including methotrexate, nbUVB, and Mogalizumab. He presents with worsened ulcerating lesions of the scalp and erythroderma and is planned for TSEBT    Plan:   Mycosis Fungoides:  Finishing TSEBT today, 5/22/25.  The patient had lymphadenopathy of the right neck concerning for possible progression of the systemic component of his lymphoma.  During treatment we had discussed the option of ED evaluation for CT imaging to rule out compressive sequelae of lymphadenopathy, which the patient declined.  He initiated steroids per his medical team last week with subsequent improvement in lymphadenopathy. As he is improved, we have no current plans for boost radiation dose to the neck at this time.  Steroids managed per medical team.  Return to clinic in 1 month for follow-up    Pain management:  The patient reports pain relief with ibuprofen; steroids may additionally help with pain  from lymphadenopathy. We will continue to assess and may consider escalating pain medications as needed.  Patient counseled to reach out to us via MyChart versus phone if pain should increase.    Dermatitis:  Counseled on skin care. Pt using aquaphor and Cerave.  Discussed importance of continue skin cares after treatment, as dermatitis reaction can peak in 1-2 weeks after treatment.      Mosaiq chart and setup information reviewed       Preston Ventura MD  PGY-4 Radiation Oncology  Department of Radiation Oncology  [facility]  Phone: [phone]    Physician Attestation:  I saw the patient in conjunction with the resident physician, Dr. Ventura, and have edited the note above to reflect my opinion and examination. Imaging and pathology were reviewed as above. I spent 30 minutes in consultation with the patient, including face-to-face time and same day care coordination and pathology/imaging review.    Carlos Kelley MD    Associate Residency   Department of Radiation Oncology  [facility]    Carlos Kellye MD     Department of Radiation Oncology  [facility]        Again, thank you for allowing me to participate in the care of your patient.        Sincerely,        Carlos Kelley MD    Electronically signed

## 2025-05-22 NOTE — PROGRESS NOTES
RADIATION ONCOLOGY WEEKLY ON TREATMENT VISIT   Encounter Date: May 22, 2025     Patient Name: Blane Ugalde  MRN: [id number]  : 1960     Disease and Stage: Mycosis Fungoides, Stage IV  Treatment Site: Total Skin  Current Dose/Planned Total Dose: 1200 / 1200 cGy  Current Fraction/Planned Total fractions: 8 / 8  Concurrent Chemotherapy: No    ED visits/Hospitalizations:  None    Missed Treatments:  None    Subjective: Mr. Ugalde presents to clinic today for his last weekly on-treatment visit.  He was prescribed steroid medication by his medical oncology team last week.  Since then the right neck lymph adenopathy has improved considerably.  The swelling on the side of his head has gone down.  It still continues to weep some on his pillow at night.  There is little pain, but it is tender when wearing a hat.  He has finished treatment today.  He reports fatigue, stating that he works outside still.  He has a sun at, and asks if he can be outside working.    Objective:   /69   Pulse 60   Wt 71.7 kg (158 lb)   BMI 25.50 kg/m     KPS 90  ECOG 1  Pain Score Data Unavailable/10  General: Alert and in no acute distress.  HEENT: Normocephalic, atraumatic. No visible scleral icterus.  Neck: Apparent full range of motion. Palpable firm lymphadenopathy of the right cervical neck and right temple, improved significantly from prior appointment  CV: Appears well-perfused, with no visible cyanosis.  Lungs: Breathing easily on room air, with no difficulty completing full sentences  Extremities:  No visible edema of the upper extremities.   Neuro: Alert and oriented; grossly nonfocal. Normal speech. Moving upper and lower extremities equally.  Gait within normal limits  Skin: Diffuse erythroderma. Slight ulceration of right temple lesion.  Psych: Mood and affect are appropriate to given situation. Answers questions appropriately.        Nursing ROS:   Nutrition Alteration  Diet Type: Patient's Preference  Skin  Skin  Reaction: 1 - Faint erythema or dry desquamation  Skin Note: Baseline redness     ENT and Mouth Exam  Mucositis - Current: 0 - None   Cardiovascular  Respiratory effort: 1 - Normal - without distress  Gastrointestinal  Nausea: 0 - None     Psychosocial  Mood - Anxiety: 1 - Mild mood alteration  Pain Assessment  Pain Descriptors: Aching, Discomfort  Pain Note: Improved swelling right side of face      Treatment-related toxicities (CTCAE v5.0):  Fatigue: Grade 1: Fatigue relieved by rest  Pain: Grade 1: Mild pain  Dermatitis: Grade 2: Moderate to brisk erythema; patchy moist desquamation, mostly confined to skin folds and creases; moderate erythema    Assessment:    Mr. Ugalde is a 65 yo M with Mycosis Fungoides with Sezary Syndrome, Stage IVA2 (T4N3B1) who has progressed through multiple lines of treatment including methotrexate, nbUVB, and Mogalizumab. He presents with worsened ulcerating lesions of the scalp and erythroderma and is planned for TSEBT    Plan:   Mycosis Fungoides:  Finishing TSEBT today, 5/22/25.  The patient had lymphadenopathy of the right neck concerning for possible progression of the systemic component of his lymphoma.  During treatment we had discussed the option of ED evaluation for CT imaging to rule out compressive sequelae of lymphadenopathy, which the patient declined.  He initiated steroids per his medical team last week with subsequent improvement in lymphadenopathy. As he is improved, we have no current plans for boost radiation dose to the neck at this time.  Steroids managed per medical team.  Return to clinic in 1 month for follow-up    Pain management:  The patient reports pain relief with ibuprofen; steroids may additionally help with pain from lymphadenopathy. We will continue to assess and may consider escalating pain medications as needed.  Patient counseled to reach out to us via myDrugCostst versus phone if pain should increase.    Dermatitis:  Counseled on skin care. Pt using  aquaphor and Cerave.  Discussed importance of continue skin cares after treatment, as dermatitis reaction can peak in 1-2 weeks after treatment.      Mosaiq chart and setup information reviewed       Preston Ventura MD  PGY-4 Radiation Oncology  Department of Radiation Oncology  [facility]  Phone: [phone]    Physician Attestation:  I saw the patient in conjunction with the resident physician, Dr. Ventura, and have edited the note above to reflect my opinion and examination. Imaging and pathology were reviewed as above. I spent 30 minutes in consultation with the patient, including face-to-face time and same day care coordination and pathology/imaging review.    Carlos Kelley MD    Associate Residency   Department of Radiation Oncology  [facility]    Carlos Kelley MD     Department of Radiation Oncology  [facility]

## 2025-05-27 DIAGNOSIS — C84.11: Primary | ICD-10-CM

## 2025-05-27 RX ORDER — OXYCODONE HYDROCHLORIDE 5 MG/1
5 TABLET ORAL EVERY 6 HOURS PRN
Qty: 56 TABLET | Refills: 0 | Status: SHIPPED | OUTPATIENT
Start: 2025-05-27 | End: 2025-06-10

## 2025-05-31 ENCOUNTER — ONCOLOGY VISIT (OUTPATIENT)
Dept: RADIATION THERAPY | Facility: OUTPATIENT CENTER | Age: 65
End: 2025-05-31

## 2025-05-31 NOTE — LETTER
5/31/2025      Blane Ugalde  [address] [facility] [address]      Dear Colleague,    Thank you for referring your patient, Blane Ugalde, to the [facility] RADIATION THERAPY CLINIC. Please see a copy of my visit note below.    No notes on file    Again, thank you for allowing me to participate in the care of your patient.        Sincerely,        Carlos Kelley MD    Electronically signed

## 2025-06-02 RX ORDER — CALCIUM CARBONATE 500 MG/1
500 TABLET, CHEWABLE ORAL
Status: CANCELLED | OUTPATIENT
Start: 2025-06-02

## 2025-06-03 ENCOUNTER — HOSPITAL ENCOUNTER (OUTPATIENT)
Dept: LAB | Facility: CLINIC | Age: 65
Discharge: HOME | End: 2025-06-03
Attending: STUDENT IN AN ORGANIZED HEALTH CARE EDUCATION/TRAINING PROGRAM | Admitting: STUDENT IN AN ORGANIZED HEALTH CARE EDUCATION/TRAINING PROGRAM
Payer: COMMERCIAL

## 2025-06-03 VITALS
HEART RATE: 61 BPM | DIASTOLIC BLOOD PRESSURE: 53 MMHG | SYSTOLIC BLOOD PRESSURE: 97 MMHG | TEMPERATURE: 97.4 F | RESPIRATION RATE: 16 BRPM

## 2025-06-03 LAB
BASOPHILS # BLD AUTO: 0 10E3/UL (ref 0–0.2)
BASOPHILS NFR BLD AUTO: 1 %
EOSINOPHIL # BLD AUTO: 0.1 10E3/UL (ref 0–0.7)
EOSINOPHIL NFR BLD AUTO: 2 %
ERYTHROCYTE [DISTWIDTH] IN BLOOD BY AUTOMATED COUNT: 13.2 % (ref 10–15)
HCT VFR BLD AUTO: 39 % (ref 40–53)
HGB BLD-MCNC: 12.9 G/DL (ref 13.3–17.7)
IMM GRANULOCYTES # BLD: 0 10E3/UL
IMM GRANULOCYTES NFR BLD: 0 %
LYMPHOCYTES # BLD AUTO: 0.3 10E3/UL (ref 0.8–5.3)
LYMPHOCYTES NFR BLD AUTO: 6 %
MCH RBC QN AUTO: 29.7 PG (ref 26.5–33)
MCHC RBC AUTO-ENTMCNC: 33.1 G/DL (ref 31.5–36.5)
MCV RBC AUTO: 90 FL (ref 78–100)
MONOCYTES # BLD AUTO: 0.6 10E3/UL (ref 0–1.3)
MONOCYTES NFR BLD AUTO: 11 %
NEUTROPHILS # BLD AUTO: 4.1 10E3/UL (ref 1.6–8.3)
NEUTROPHILS NFR BLD AUTO: 79 %
NRBC # BLD AUTO: 0 10E3/UL
NRBC BLD AUTO-RTO: 0 /100
PLATELET # BLD AUTO: 180 10E3/UL (ref 150–450)
RBC # BLD AUTO: 4.34 10E6/UL (ref 4.4–5.9)
WBC # BLD AUTO: 5.2 10E3/UL (ref 4–11)

## 2025-06-03 PROCEDURE — 36522 PHOTOPHERESIS: CPT

## 2025-06-03 PROCEDURE — 85048 AUTOMATED LEUKOCYTE COUNT: CPT

## 2025-06-03 PROCEDURE — 250N000009 HC RX 250

## 2025-06-03 PROCEDURE — 36415 COLL VENOUS BLD VENIPUNCTURE: CPT

## 2025-06-03 RX ORDER — CALCIUM CARBONATE 500 MG/1
500 TABLET, CHEWABLE ORAL
Status: CANCELLED | OUTPATIENT
Start: 2025-06-03

## 2025-06-03 RX ADMIN — ANTICOAGULANT CITRATE DEXTROSE SOLUTION FORMULA A 220 ML: 12.25; 11; 3.65 SOLUTION INTRAVENOUS at 12:50

## 2025-06-03 NOTE — DISCHARGE INSTRUCTIONS
Apheresis Blood Donor Center Post Instructions  You may feel tired after your procedure today.   Please call your doctor if you have:  bleeding that doesn t stop, fever, pain where a needle or tube (catheter) was placed, seizures, trouble breathing, red urine, nausea or vomiting, other health concerns.     If your symptoms are severe, call 911.  If your veins were used, keep the bandages on for 2-4 hours.  Avoid heavy lifting with your arms.  If bleeding occurs from these sites, apply firm pressure for 5-10 minutes.  Call your physician if bleeding continues.    The Apheresis/Blood Donor Center is open Monday-Friday 7:30 a.m. to 5 p.m.  The phone number is [phone].  A Transfusion Medicine physician can be reached after 5:00 p.m. weekdays and on weekends /Holidays by calling [phone], and asking for the physician on call.      Photopheresis:  Avoid sunlight , and wear UVA-protective, full coverage sunglasses and sunscreen SPF 15 or higher  for 24 hours after your treatment.  The drug used in your treatment makes patients more sensitive to sunlight for about 24 hours after the treatment.

## 2025-06-03 NOTE — PROCEDURES
Radiotherapy Treatment Summary          Date of Report: May 31, 2025     PATIENT: ANA MARIA UGALDE  MEDICAL RECORD NO: [id number]  : 1960     DIAGNOSIS: C84.0 Mycosis fungoides  INTENT OF RADIOTHERAPY: Palliative and Local Control  PATHOLOGY: Mycosis Fungoides, Sezary syndrome,   STAGE: Stage IVA2 (T4N3B1)  CONCURRENT SYSTEMIC THERAPY: none     Details of the treatments summarized below are found in records kept in the Department of Radiation Oncology at [facility].     Treatment Summary:  Radiation Oncology - Course: 1 Protocol:   Treatment Site Current Dose Modality From To Elapsed Days Fx.  total skin  1,200 cGy e09  2025   9  8  perineum Bst    700 cGy e09  2025   1  2          Dose per Fraction: total skin 150 cGy  perineum Bst 350 cGy  Total Dose: total skin 1,200 cGy  perineum Bst  700 cGy  COMMENTS:   Mr. Ugalde, 64 year old Male with Mycosis Fungoides with Sezary Syndrome, Stage IVA2 (T4N3B1),   progressed through multiple treatments including methotrexate, nbUVB, and Mogalizumab. He presented   with worsened ulcerating scalp lesions and diffuse erythroderma. He underwent palliative and local control   intent radiotherapy with 1,200 cGy in 8 fractions from 2025 to 2025, and a perineum boost of 700   cGy in 2 fractions from 2025 to 2025 using electron technique. He experienced Grade 1 fatigue   relieved by rest, Grade 1 mild pain, and Grade 2 dermatitis with moderate to brisk erythema and patchy   moist desquamation mostly in skin folds. The patient had lymphadenopathy of the right neck concerning for   possible progression of the systemic component of his lymphoma.  During treatment we had discussed the   option of ED evaluation for CT imaging to rule out compressive sequelae of lymphadenopathy, which the   patient declined.  He initiated steroids per his medical team with subsequent improvement in   lymphadenopathy. No treatment breaks.     ED  visits/hospitalizations: none     Missed treatments: none     Acute Toxicity Profile by CTC v5.0:  Fatigue: Grade 1: Fatigue relieved by rest 2. Pain: Grade 1: Mild pain 3. Dermatitis: Grade 2: Moderate to brisk   erythema; patchy moist desquamation, mostly confined to skin folds and creases; moderate erythema     PAIN MANAGEMENT:    0/10 pain management not required  FOLLOW UP PLAN:   1. Followup in 1 month  2. Continue skin care. Pt using aquaphor and Cerave.  3. Followup with medical oncology as scheduled     Resident Physician: Preston Ventura M.D.  Staff Physician: Carlos Kelley M.D.     CC: MD Ezra Barnett MD Craig Eckfeldt, MD Sara Wojciechochski, RN Specialty Care Coordinator                                  Radiation Oncology:  [facility] [address], [address] [place] [phone]

## 2025-06-03 NOTE — PROCEDURES
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   He tolerated it well and is on our schedule for tomorrow.  He has had concern about his disease continuing through multiple therapies, and his clinical team is persuing additional therapies.  He has recently received rounds of radiation therapy, a couple of weeks ago.  His skin is noticeably red.   Resting through much of the procedure.                 History of Present Illness:   Blane Ugalde is a 64 year old male with past medical history of  hyperlipidemia and a prothrombin gene mutation. He has been followed for rashes and cutaneous symptoms since 2011, but has not had a definitive diagnosis and staging for CTCL or Sézary until a skin biopsy, blood and bone marrow work-up in early 2024 . His first ECP was in March of 2024 ECP and  currently has 2 procedures a month on consecutive days               Past Medical History:   No past medical history on file.  CTCL/Sezary          Past Surgical History:     Past Surgical History:   Procedure Laterality Date    APPENDECTOMY      IR LYMPH NODE BIOPSY  7/2/2024             Allergies:   No Known Allergies          Medications:     Current Outpatient Medications   Medication Sig Dispense Refill    acetaminophen (TYLENOL) 325 MG tablet Take 650 mg by mouth      ciprofloxacin-dexAMETHasone (CIPRODEX) 0.3-0.1 % otic suspension Place 4 drops Into the left ear 2 times daily. 10 mL 0    clobetasol (TEMOVATE) 0.05 % external ointment Apply topically 2 times daily. Apply thin layer to rash on the legs twice daily 60 g 4    clobetasol (TEMOVATE) 0.05 % external ointment Apply topically 2 times daily 60 g 3     folic acid (FOLVITE) 1 MG tablet Take 1 tablet (1 mg) by mouth daily , except on days that you take methotrexate 26 tablet 11    naltrexone (DEPADE/REVIA) 50 MG tablet Take 0.5 tablets (25 mg) by mouth daily. 30 tablet 3    nystatin (MYCOSTATIN) 659383 UNIT/ML suspension Switch 5ml AND SPIT 3-4 times a day. 473 mL 1    oxyCODONE (ROXICODONE) 5 MG tablet Take 1 tablet (5 mg) by mouth every 6 hours as needed for pain. 56 tablet 0    tazarotene (TAZORAC) 0.1 % external cream Apply to areas of thick scaling on hands and feet daily 60 g 3    triamcinolone (KENALOG) 0.1 % external cream Apply twice a day, avoiding skin folds, face, and groin area. 453.6 g 3    triamcinolone (KENALOG) 0.1 % external cream APPLY TO AFFECTED AREA 2 TIMES A DAY FOR 10 DAYS TO ARMS, CHEST, NECK, AND BACK      clobetasol propionate (CLOBEX) 0.05 % external shampoo       ferrous sulfate dried 160 (50 Fe) MG tablet Take 1 tablet by mouth daily (with breakfast). 65 mg      hydrOXYzine HCl (ATARAX) 25 MG tablet Take 1-2 tablets (25-50 mg) by mouth 3 times daily as needed for itching. 60 tablet 2    methotrexate 2.5 MG tablet Take 10 tablets (25 mg) by mouth every 7 days . Take the same day of the week. 40 tablet 5    omeprazole (PRILOSEC) 10 MG DR capsule Take 20 mg by mouth daily      prochlorperazine (COMPAZINE) 10 MG tablet Take 10 mg by mouth.      pseudoePHEDrine (SUDAFED) 30 MG tablet Take 30 mg by mouth       Current Facility-Administered Medications   Medication Dose Route Frequency Provider Last Rate Last Admin    - medication instruction - If heparin contraindicated, use anticoagulant citrate dextrose formula A for prime and procedure.    Does not apply During Apheresis (from stock) Wendie Aragon MD        methoxsalen (photopheresis) SOLN   Apheresis DOES NOT GO TO Wendie Hyman MD                 Review of Systems:     See above           Exam:   /56   Pulse 60   Temp 97.7  F (36.5  C) (Oral)   Resp 16   Resting,  no apparent distress  All exposed/visible skin appears red.  Breathing appears comfortable on room air  Peripheral IV access for the procedure.             Data:     Results for orders placed or performed during the hospital encounter of 06/03/25   CBC with platelets and differential     Status: Abnormal   Result Value Ref Range    WBC Count 5.2 4.0 - 11.0 10e3/uL    RBC Count 4.34 (L) 4.40 - 5.90 10e6/uL    Hemoglobin 12.9 (L) 13.3 - 17.7 g/dL    Hematocrit 39.0 (L) 40.0 - 53.0 %    MCV 90 78 - 100 fL    MCH 29.7 26.5 - 33.0 pg    MCHC 33.1 31.5 - 36.5 g/dL    RDW 13.2 10.0 - 15.0 %    Platelet Count 180 150 - 450 10e3/uL    % Neutrophils 79 %    % Lymphocytes 6 %    % Monocytes 11 %    % Eosinophils 2 %    % Basophils 1 %    % Immature Granulocytes 0 %    NRBCs per 100 WBC 0 <1 /100    Absolute Neutrophils 4.1 1.6 - 8.3 10e3/uL    Absolute Lymphocytes 0.3 (L) 0.8 - 5.3 10e3/uL    Absolute Monocytes 0.6 0.0 - 1.3 10e3/uL    Absolute Eosinophils 0.1 0.0 - 0.7 10e3/uL    Absolute Basophils 0.0 0.0 - 0.2 10e3/uL    Absolute Immature Granulocytes 0.0 <=0.4 10e3/uL    Absolute NRBCs 0.0 10e3/uL   CBC with platelets differential     Status: Abnormal    Narrative    The following orders were created for panel order CBC with platelets differential.  Procedure                               Abnormality         Status                     ---------                               -----------         ------                     CBC with platelets and ...[[id number]]  Abnormal            Final result                 Please view results for these tests on the individual orders.                Procedure Summary:   Extracorporeal photopheresis was performed on a [employer] device. Peripheral IV was used for access. ACD-A/saline was used to prime the circuit and ACD-A was used during the procedure for anticoagulation.  The patient's vital signs were stable throughout.  The patient tolerated the procedure well without complication.  See  apheresis flowsheet for additional details.         Attestation: During the procedure the patient was directly seen and evaluated by me, Tremayne Gillis MD.  I have reviewed the chart and pertinent laboratory findings, and discussed the patient and the current procedure with the Apheresis nursing staff.    Tremayne Gillis MD  Transfusion Medicine Attending  Laboratory Medicine & Pathology

## 2025-06-03 NOTE — PROGRESS NOTES
Dear Dr. Rioc:    I had the pleasure of meeting Blane Ugalde in consultation today at the [facility] at your request.     History of Present Illness:   Blane Ugalde is a 64 year old man referred for evaluation of lymphadenopathy in the setting of known mycosis fungoides.     He has a history of mycosis fungoides in 2024.     He has been seen by oncology locally, the [place] and at [place] and has been recommended for treatment.      He had a PET scan 4/2025 which demonstrated progressive FDG activity of calvarial, parotid, cervical supraclavicular, axillary, inguinal, hilar nodes consistent with mild progression of disease.     He saw his local oncology team in April 2025 without concerns for aggressive transformation, discussed systemic therapy and consideration of radiation while waiting response to his systemic therapy.    He had photophoresis in May 2025 and again this month.    He completed radiation with Dr Dumont from 5/13/2025 to 5/22/2025 with treatment to skin and perineum. Per his treatment summary after radiation there was concerns for right neck lymphadenopathy, was started on steroids per oncology with improvement in symptoms.     He is going to start chemotherapy once he recovers from the radiation.     He feels like his lymph nodes decreased in size with the radiation. He says these were quite large prior but these have resolved. He had some bumps on the side of his right face which also resolved with the radiation. He says he can still feel the lymph nodes but much smaller. These were causing some pain before radiation.     He is accompanied by his wife who supplements history.      Past medical history: mycosis fungoides    Past surgical history: shoulder surgery, appendectomy    Social history: No smoking. No chewing tobacco. Occasional alcohol. . Work in construction.         MEDICATIONS:     Current Outpatient Medications   Medication Sig Dispense  Refill    acetaminophen (TYLENOL) 325 MG tablet Take 650 mg by mouth      ciprofloxacin-dexAMETHasone (CIPRODEX) 0.3-0.1 % otic suspension Place 4 drops Into the left ear 2 times daily. 10 mL 0    clobetasol (TEMOVATE) 0.05 % external ointment Apply topically 2 times daily. Apply thin layer to rash on the legs twice daily 60 g 4    clobetasol (TEMOVATE) 0.05 % external ointment Apply topically 2 times daily 60 g 3    clobetasol propionate (CLOBEX) 0.05 % external shampoo       ferrous sulfate dried 160 (50 Fe) MG tablet Take 1 tablet by mouth daily (with breakfast). 65 mg      folic acid (FOLVITE) 1 MG tablet Take 1 tablet (1 mg) by mouth daily , except on days that you take methotrexate 26 tablet 11    hydrOXYzine HCl (ATARAX) 25 MG tablet Take 1-2 tablets (25-50 mg) by mouth 3 times daily as needed for itching. 60 tablet 2    methotrexate 2.5 MG tablet Take 10 tablets (25 mg) by mouth every 7 days . Take the same day of the week. 40 tablet 5    naltrexone (DEPADE/REVIA) 50 MG tablet Take 0.5 tablets (25 mg) by mouth daily. 30 tablet 3    nystatin (MYCOSTATIN) 612417 UNIT/ML suspension Switch 5ml AND SPIT 3-4 times a day. 473 mL 1    omeprazole (PRILOSEC) 10 MG DR capsule Take 20 mg by mouth daily      oxyCODONE (ROXICODONE) 5 MG tablet Take 1 tablet (5 mg) by mouth every 6 hours as needed for pain. 56 tablet 0    prochlorperazine (COMPAZINE) 10 MG tablet Take 10 mg by mouth.      pseudoePHEDrine (SUDAFED) 30 MG tablet Take 30 mg by mouth      tazarotene (TAZORAC) 0.1 % external cream Apply to areas of thick scaling on hands and feet daily 60 g 3    triamcinolone (KENALOG) 0.1 % external cream Apply twice a day, avoiding skin folds, face, and groin area. 453.6 g 3    triamcinolone (KENALOG) 0.1 % external cream APPLY TO AFFECTED AREA 2 TIMES A DAY FOR 10 DAYS TO ARMS, CHEST, NECK, AND BACK         ALLERGIES:  No Known Allergies    HABITS/SOCIAL HISTORY:   No smoking. No chewing tobacco. Occasional alcohol.    .   Work in construction.     Social History     Socioeconomic History    Marital status:      Spouse name: Not on file    Number of children: Not on file    Years of education: Not on file    Highest education level: Not on file   Occupational History    Not on file   Tobacco Use    Smoking status: Never    Smokeless tobacco: Never   Vaping Use    Vaping status: Never Used   Substance and Sexual Activity    Alcohol use: Not on file    Drug use: Not on file    Sexual activity: Not on file   Other Topics Concern    Not on file   Social History Narrative    Not on file     Social Drivers of Health     Financial Resource Strain: Not on file   Food Insecurity: No Food Insecurity (7/18/2024)    Received from [facility]    Hunger Vital Sign     Worried About Running Out of Food in the Last Year: Never true     Ran Out of Food in the Last Year: Never true   Transportation Needs: No Transportation Needs (7/18/2024)    Received from [facility]    PRAPARE - Transportation     Lack of Transportation (Medical): No     Lack of Transportation (Non-Medical): No   Physical Activity: Sufficiently Active (7/18/2024)    Received from [facility]    Exercise Vital Sign     Days of Exercise per Week: 5 days     Minutes of Exercise per Session: 150+ min   Stress: Not on file   Social Connections: Not on file   Interpersonal Safety: Not At Risk (10/9/2024)    Received from [facility] SDOH: Intimate Partner Violence     [facility] IPV - Concerns about your personal safety: No   Housing Stability: Low Risk  (7/18/2024)    Received from [facility]    Housing Stability     What is your living situation today?: I have a steady place to live       PAST MEDICAL HISTORY: No past medical history on file.     PAST SURGICAL HISTORY:   Past Surgical History:   Procedure Laterality Date    APPENDECTOMY      IR LYMPH NODE BIOPSY  7/2/2024       FAMILY HISTORY:  No family history on file.    REVIEW OF SYSTEMS:  12 point ROS  "was negative other than the symptoms noted above in the HPI.  Patient Supplied Answers to Review of Systems      6/4/2025    11:48 AM   [facility] ROS   Endocrine Heat or cold intolerance   Other Rash         PHYSICAL EXAMINATION:   /67   Pulse 66   Ht 1.651 m (5' 5\")   Wt 74 kg (163 lb 3.2 oz)   SpO2 98%   BMI 27.16 kg/m    Appearance:   normal; NAD, age-appropriate appearance, well-developed, normal habitus   Communication:   normal; communicates verbally, normal voice quality   Head/Face:   inspection -  skin changes consistent with mycosis fungoides/erythroderma   Salivary glands -  Normal size, no tenderness, swelling, or palpable masses   Facial strength -  Normal and symmetric bilateral; H/B I/VI   Skin:  Peeling erythroderma throughout  Healing radiation burn on right temple   Ears:  auricle (AD) -  normal  EAC (AD) -  normal  TM (AD) -  Normal, np effusion  auricle (AS) -  normal  EAC (AS) -  significant drainage suctioned  TM (AS) -  perforation superior posterior quadrant, spares annulus  Normal clinical speech reception   Nose:  Ext. inspection -  Normal   Oral Cavity:  lips -  radiation changes  Dry oral cavity mucosa, no obvious mucositis  No thrush   Neck: No visible mass or asymmetry   Right level IB/tail of parotid with about a 1.5 cm mobile node  Normal range of motion   Lymphatic:  As above   Cardiovascular:  warm, pink, well-perfused extremities without swelling, tenderness, or edema   Respiratory:  Normal respiratory effort, no stridor   Neuro/Psych.:  mood/affect -  normal  mental status -  normal       PROCEDURES:   The left ear was examined with an operating microscope and significant drainage noted.  The ear canal was suctioned of all the drainage. The TM had a perforation, no obvious purulence.     RESULTS REVIEWED:   I reviewed PET report, derm note, local oncology note, radiation oncology note    Care discussed with cytology staff    IMPRESSION AND PLAN:   Blane Ugalde is a 64 year " old man with a history of mycosis fungoides referred for evaluation of lymphadenopathy.    Per the patient his oncologist had discussed biopsy. I explained that an excision cannot be performed in clinic today. The node is significantly smaller in size, unclear if response to radiation or steroids. We offered him an FNA in clinic today which was performed by cytology today. I did explain that lymphomas can create challenges for diagnosis on FNA.     The patient has a TM perforation on the left. This was cleaned today. He was advised he needs to practice water precautions. He does think he may get water in his ear when he does beach baths, advised to protect the ear to avoid communication with the middle ear. He can establish care locally for his ear if he wants.    He was recommended to use salt/soda rinses for his mouth. There is no evidence of thrush, no indication for nystatin.     He was advised to use aquaphor on his lips.    We will contact him with fna results.      Thank you very much for the opportunity to participate in the care of your patient.      Fifi Sol MD  Otolaryngology- Head & Neck Surgery      This note was dictated with voice recognition software and then edited. Please excuse any unintentional errors.         CC:  Ezra Rico MD  [address] [facility] [id number]

## 2025-06-04 ENCOUNTER — OFFICE VISIT (OUTPATIENT)
Dept: OTOLARYNGOLOGY | Facility: CLINIC | Age: 65
End: 2025-06-04
Attending: STUDENT IN AN ORGANIZED HEALTH CARE EDUCATION/TRAINING PROGRAM
Payer: COMMERCIAL

## 2025-06-04 ENCOUNTER — PRE VISIT (OUTPATIENT)
Dept: OTOLARYNGOLOGY | Facility: CLINIC | Age: 65
End: 2025-06-04

## 2025-06-04 ENCOUNTER — HOSPITAL ENCOUNTER (OUTPATIENT)
Dept: LAB | Facility: CLINIC | Age: 65
Discharge: HOME | End: 2025-06-04
Attending: STUDENT IN AN ORGANIZED HEALTH CARE EDUCATION/TRAINING PROGRAM | Admitting: STUDENT IN AN ORGANIZED HEALTH CARE EDUCATION/TRAINING PROGRAM
Payer: COMMERCIAL

## 2025-06-04 VITALS
RESPIRATION RATE: 16 BRPM | TEMPERATURE: 98.4 F | BODY MASS INDEX: 26.05 KG/M2 | WEIGHT: 161.38 LBS | SYSTOLIC BLOOD PRESSURE: 110 MMHG | HEART RATE: 64 BPM | DIASTOLIC BLOOD PRESSURE: 59 MMHG

## 2025-06-04 VITALS
DIASTOLIC BLOOD PRESSURE: 67 MMHG | SYSTOLIC BLOOD PRESSURE: 122 MMHG | OXYGEN SATURATION: 98 % | WEIGHT: 163.2 LBS | HEART RATE: 66 BPM | BODY MASS INDEX: 27.19 KG/M2 | HEIGHT: 65 IN

## 2025-06-04 DIAGNOSIS — R59.0 PAROTID LYMPHADENOPATHY: ICD-10-CM

## 2025-06-04 DIAGNOSIS — C84.09 MYCOSIS FUNGOIDES INVOLVING EXTRANODAL SITE EXCLUDING SPLEEN AND OTHER SOLID ORGANS (H): Primary | ICD-10-CM

## 2025-06-04 PROCEDURE — 99204 OFFICE O/P NEW MOD 45 MIN: CPT | Mod: 25 | Performed by: OTOLARYNGOLOGY

## 2025-06-04 PROCEDURE — 88184 FLOWCYTOMETRY/ TC 1 MARKER: CPT | Performed by: OTOLARYNGOLOGY

## 2025-06-04 PROCEDURE — 10021 FNA BX W/O IMG GDN 1ST LES: CPT | Mod: GC | Performed by: PATHOLOGY

## 2025-06-04 PROCEDURE — 92504 EAR MICROSCOPY EXAMINATION: CPT | Performed by: OTOLARYNGOLOGY

## 2025-06-04 PROCEDURE — 1125F AMNT PAIN NOTED PAIN PRSNT: CPT | Performed by: OTOLARYNGOLOGY

## 2025-06-04 PROCEDURE — 250N000009 HC RX 250

## 2025-06-04 PROCEDURE — 36522 PHOTOPHERESIS: CPT

## 2025-06-04 PROCEDURE — 88173 CYTOPATH EVAL FNA REPORT: CPT | Mod: 26 | Performed by: PATHOLOGY

## 2025-06-04 PROCEDURE — 3074F SYST BP LT 130 MM HG: CPT | Performed by: OTOLARYNGOLOGY

## 2025-06-04 PROCEDURE — 3078F DIAST BP <80 MM HG: CPT | Performed by: OTOLARYNGOLOGY

## 2025-06-04 PROCEDURE — 88189 FLOWCYTOMETRY/READ 16 & >: CPT | Performed by: PATHOLOGY

## 2025-06-04 PROCEDURE — 88172 CYTP DX EVAL FNA 1ST EA SITE: CPT | Mod: TC | Performed by: OTOLARYNGOLOGY

## 2025-06-04 PROCEDURE — 88305 TISSUE EXAM BY PATHOLOGIST: CPT | Mod: 26 | Performed by: PATHOLOGY

## 2025-06-04 PROCEDURE — 88172 CYTP DX EVAL FNA 1ST EA SITE: CPT | Mod: 26 | Performed by: PATHOLOGY

## 2025-06-04 RX ADMIN — ANTICOAGULANT CITRATE DEXTROSE SOLUTION FORMULA A 219 ML: 12.25; 11; 3.65 SOLUTION INTRAVENOUS at 08:40

## 2025-06-04 ASSESSMENT — PAIN SCALES - GENERAL: PAINLEVEL_OUTOF10: MODERATE PAIN (5)

## 2025-06-04 NOTE — LETTER
6/4/2025       RE: Blane Ugalde  [address]  [facility] [address]     Dear Colleague,    Thank you for referring your patient, Blane Ugalde, to the [facility] E[facility] NOSE [facility] [place] at [facility]. Please see a copy of my visit note below.    Dear Dr. Rico:    I had the pleasure of meeting Blane Ugalde in consultation today at the [facility] at your request.     History of Present Illness:   Blane Ugalde is a 64 year old man referred for evaluation of lymphadenopathy in the setting of known mycosis fungoides.     He has a history of mycosis fungoides in 2024.     He has been seen by oncology locally, the [place] and at [place] and has been recommended for treatment.      He had a PET scan 4/2025 which demonstrated progressive FDG activity of calvarial, parotid, cervical supraclavicular, axillary, inguinal, hilar nodes consistent with mild progression of disease.     He saw his local oncology team in April 2025 without concerns for aggressive transformation, discussed systemic therapy and consideration of radiation while waiting response to his systemic therapy.    He had photophoresis in May 2025 and again this month.    He completed radiation with Dr Dumont from 5/13/2025 to 5/22/2025 with treatment to skin and perineum. Per his treatment summary after radiation there was concerns for right neck lymphadenopathy, was started on steroids per oncology with improvement in symptoms.     He is going to start chemotherapy once he recovers from the radiation.     He feels like his lymph nodes decreased in size with the radiation. He says these were quite large prior but these have resolved. He had some bumps on the side of his right face which also resolved with the radiation. He says he can still feel the lymph nodes but much smaller. These were causing some pain before radiation.     He is accompanied by  his wife who supplements history.      Past medical history: mycosis fungoides    Past surgical history: shoulder surgery, appendectomy    Social history: No smoking. No chewing tobacco. Occasional alcohol. . Work in construction.         MEDICATIONS:     Current Outpatient Medications   Medication Sig Dispense Refill     acetaminophen (TYLENOL) 325 MG tablet Take 650 mg by mouth       ciprofloxacin-dexAMETHasone (CIPRODEX) 0.3-0.1 % otic suspension Place 4 drops Into the left ear 2 times daily. 10 mL 0     clobetasol (TEMOVATE) 0.05 % external ointment Apply topically 2 times daily. Apply thin layer to rash on the legs twice daily 60 g 4     clobetasol (TEMOVATE) 0.05 % external ointment Apply topically 2 times daily 60 g 3     clobetasol propionate (CLOBEX) 0.05 % external shampoo        ferrous sulfate dried 160 (50 Fe) MG tablet Take 1 tablet by mouth daily (with breakfast). 65 mg       folic acid (FOLVITE) 1 MG tablet Take 1 tablet (1 mg) by mouth daily , except on days that you take methotrexate 26 tablet 11     hydrOXYzine HCl (ATARAX) 25 MG tablet Take 1-2 tablets (25-50 mg) by mouth 3 times daily as needed for itching. 60 tablet 2     methotrexate 2.5 MG tablet Take 10 tablets (25 mg) by mouth every 7 days . Take the same day of the week. 40 tablet 5     naltrexone (DEPADE/REVIA) 50 MG tablet Take 0.5 tablets (25 mg) by mouth daily. 30 tablet 3     nystatin (MYCOSTATIN) 757544 UNIT/ML suspension Switch 5ml AND SPIT 3-4 times a day. 473 mL 1     omeprazole (PRILOSEC) 10 MG DR capsule Take 20 mg by mouth daily       oxyCODONE (ROXICODONE) 5 MG tablet Take 1 tablet (5 mg) by mouth every 6 hours as needed for pain. 56 tablet 0     prochlorperazine (COMPAZINE) 10 MG tablet Take 10 mg by mouth.       pseudoePHEDrine (SUDAFED) 30 MG tablet Take 30 mg by mouth       tazarotene (TAZORAC) 0.1 % external cream Apply to areas of thick scaling on hands and feet daily 60 g 3     triamcinolone (KENALOG) 0.1 %  external cream Apply twice a day, avoiding skin folds, face, and groin area. 453.6 g 3     triamcinolone (KENALOG) 0.1 % external cream APPLY TO AFFECTED AREA 2 TIMES A DAY FOR 10 DAYS TO ARMS, CHEST, NECK, AND BACK         ALLERGIES:  No Known Allergies    HABITS/SOCIAL HISTORY:   No smoking. No chewing tobacco. Occasional alcohol.   .   Work in construction.     Social History     Socioeconomic History     Marital status:      Spouse name: Not on file     Number of children: Not on file     Years of education: Not on file     Highest education level: Not on file   Occupational History     Not on file   Tobacco Use     Smoking status: Never     Smokeless tobacco: Never   Vaping Use     Vaping status: Never Used   Substance and Sexual Activity     Alcohol use: Not on file     Drug use: Not on file     Sexual activity: Not on file   Other Topics Concern     Not on file   Social History Narrative     Not on file     Social Drivers of Health     Financial Resource Strain: Not on file   Food Insecurity: No Food Insecurity (7/18/2024)    Received from [facility]    Hunger Vital Sign      Worried About Running Out of Food in the Last Year: Never true      Ran Out of Food in the Last Year: Never true   Transportation Needs: No Transportation Needs (7/18/2024)    Received from [facility]    PRAPARE - Transportation      Lack of Transportation (Medical): No      Lack of Transportation (Non-Medical): No   Physical Activity: Sufficiently Active (7/18/2024)    Received from [facility]    Exercise Vital Sign      Days of Exercise per Week: 5 days      Minutes of Exercise per Session: 150+ min   Stress: Not on file   Social Connections: Not on file   Interpersonal Safety: Not At Risk (10/9/2024)    Received from  SDOH: Intimate Partner Violence      [facility] IPV - Concerns about your personal safety: No   Housing Stability: Low Risk  (7/18/2024)    Received from [facility]    Housing  "Stability      What is your living situation today?: I have a steady place to live       PAST MEDICAL HISTORY: No past medical history on file.     PAST SURGICAL HISTORY:   Past Surgical History:   Procedure Laterality Date     APPENDECTOMY       IR LYMPH NODE BIOPSY  7/2/2024       FAMILY HISTORY:  No family history on file.    REVIEW OF SYSTEMS:  12 point ROS was negative other than the symptoms noted above in the HPI.  Patient Supplied Answers to Review of Systems      6/4/2025    11:48 AM   [facility] E[facility] ROS   Endocrine Heat or cold intolerance   Other Rash         PHYSICAL EXAMINATION:   /67   Pulse 66   Ht 1.651 m (5' 5\")   Wt 74 kg (163 lb 3.2 oz)   SpO2 98%   BMI 27.16 kg/m    Appearance:   normal; NAD, age-appropriate appearance, well-developed, normal habitus   Communication:   normal; communicates verbally, normal voice quality   Head/Face:   inspection -  skin changes consistent with mycosis fungoides/erythroderma   Salivary glands -  Normal size, no tenderness, swelling, or palpable masses   Facial strength -  Normal and symmetric bilateral; H/B I/VI   Skin:  Peeling erythroderma throughout  Healing radiation burn on right temple   Ears:  auricle (AD) -  normal  EAC (AD) -  normal  TM (AD) -  Normal, np effusion  auricle (AS) -  normal  EAC (AS) -  significant drainage suctioned  TM (AS) -  perforation superior posterior quadrant, spares annulus  Normal clinical speech reception   Nose:  Ext. inspection -  Normal   Oral Cavity:  lips -  radiation changes  Dry oral cavity mucosa, no obvious mucositis  No thrush   Neck: No visible mass or asymmetry   Right level IB/tail of parotid with about a 1.5 cm mobile node  Normal range of motion   Lymphatic:  As above   Cardiovascular:  warm, pink, well-perfused extremities without swelling, tenderness, or edema   Respiratory:  Normal respiratory effort, no stridor   Neuro/Psych.:  mood/affect -  normal  mental status -  normal       PROCEDURES:   The left ear " was examined with an operating microscope and significant drainage noted.  The ear canal was suctioned of all the drainage. The TM had a perforation, no obvious purulence.     RESULTS REVIEWED:   I reviewed PET report, derm note, local oncology note, radiation oncology note    Care discussed with cytology staff    IMPRESSION AND PLAN:   Blane Ugalde is a 64 year old man with a history of mycosis fungoides referred for evaluation of lymphadenopathy.    Per the patient his oncologist had discussed biopsy. I explained that an excision cannot be performed in clinic today. The node is significantly smaller in size, unclear if response to radiation or steroids. We offered him an FNA in clinic today which was performed by cytology today. I did explain that lymphomas can create challenges for diagnosis on FNA.     The patient has a TM perforation on the left. This was cleaned today. He was advised he needs to practice water precautions. He does think he may get water in his ear when he does beach baths, advised to protect the ear to avoid communication with the middle ear. He can establish care locally for his ear if he wants.    He was recommended to use salt/soda rinses for his mouth. There is no evidence of thrush, no indication for nystatin.     He was advised to use aquaphor on his lips.    We will contact him with fna results.      Thank you very much for the opportunity to participate in the care of your patient.      Fifi Sol MD  Otolaryngology- Head & Neck Surgery      This note was dictated with voice recognition software and then edited. Please excuse any unintentional errors.         CC:  Ezra Rico MD  [address] [place] [id number]    Again, thank you for allowing me to participate in the care of your patient.      Sincerely,    Fifi Sol MD

## 2025-06-04 NOTE — PROCEDURES
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   He tolerated it well.  He has had concern about his disease continuing through multiple therapies, and his clinical team is persuing additional therapies.  He has recently received rounds of radiation therapy, a couple of weeks ago.  His skin is noticeably red.   Notes he is starting to feel better his radiation treatment.  Denies nausea, vomiting, fevers, chills.                 History of Present Illness:   Blane Ugalde is a 64 year old male with past medical history of  hyperlipidemia and a prothrombin gene mutation. He has been followed for rashes and cutaneous symptoms since 2011, but has not had a definitive diagnosis and staging for CTCL or Sézary until a skin biopsy, blood and bone marrow work-up in early 2024 . His first ECP was in March of 2024 ECP and  currently has 2 procedures a month on consecutive days               Past Medical History:   No past medical history on file.  CTCL/Sezary          Past Surgical History:     Past Surgical History:   Procedure Laterality Date    APPENDECTOMY      IR LYMPH NODE BIOPSY  7/2/2024             Allergies:   No Known Allergies          Medications:     Current Outpatient Medications   Medication Sig Dispense Refill    acetaminophen (TYLENOL) 325 MG tablet Take 650 mg by mouth      ciprofloxacin-dexAMETHasone (CIPRODEX) 0.3-0.1 % otic suspension Place 4 drops Into the left ear 2 times daily. 10 mL 0    clobetasol (TEMOVATE) 0.05 % external ointment Apply topically 2 times daily. Apply thin layer to rash on the legs twice daily 60 g 4    clobetasol (TEMOVATE) 0.05 % external ointment Apply  topically 2 times daily 60 g 3    clobetasol propionate (CLOBEX) 0.05 % external shampoo       ferrous sulfate dried 160 (50 Fe) MG tablet Take 1 tablet by mouth daily (with breakfast). 65 mg      folic acid (FOLVITE) 1 MG tablet Take 1 tablet (1 mg) by mouth daily , except on days that you take methotrexate 26 tablet 11    hydrOXYzine HCl (ATARAX) 25 MG tablet Take 1-2 tablets (25-50 mg) by mouth 3 times daily as needed for itching. 60 tablet 2    methotrexate 2.5 MG tablet Take 10 tablets (25 mg) by mouth every 7 days . Take the same day of the week. 40 tablet 5    naltrexone (DEPADE/REVIA) 50 MG tablet Take 0.5 tablets (25 mg) by mouth daily. 30 tablet 3    nystatin (MYCOSTATIN) 801640 UNIT/ML suspension Switch 5ml AND SPIT 3-4 times a day. 473 mL 1    omeprazole (PRILOSEC) 10 MG DR capsule Take 20 mg by mouth daily      oxyCODONE (ROXICODONE) 5 MG tablet Take 1 tablet (5 mg) by mouth every 6 hours as needed for pain. 56 tablet 0    prochlorperazine (COMPAZINE) 10 MG tablet Take 10 mg by mouth.      pseudoePHEDrine (SUDAFED) 30 MG tablet Take 30 mg by mouth      tazarotene (TAZORAC) 0.1 % external cream Apply to areas of thick scaling on hands and feet daily 60 g 3    triamcinolone (KENALOG) 0.1 % external cream Apply twice a day, avoiding skin folds, face, and groin area. 453.6 g 3    triamcinolone (KENALOG) 0.1 % external cream APPLY TO AFFECTED AREA 2 TIMES A DAY FOR 10 DAYS TO ARMS, CHEST, NECK, AND BACK       Current Facility-Administered Medications   Medication Dose Route Frequency Provider Last Rate Last Admin    methoxsalen (photopheresis) SOLN   Apheresis DOES NOT GO TO Wendie Hyman MD                 Review of Systems:     See above           Exam:   /59   Pulse 64   Temp 98.4  F (36.9  C) (Oral)   Resp 16   Wt 73.2 kg (161 lb 6 oz)   BMI 26.05 kg/m    Easily awakened, no apparent distress  All exposed/visible skin appears red.  Breathing appears comfortable on room air  Peripheral  IV access for the procedure.             Data:       CBC  Recent Labs   Lab 06/03/25  1253   WBC 5.2   RBC 4.34*   HGB 12.9*   HCT 39.0*   MCV 90   MCH 29.7   MCHC 33.1   RDW 13.2                       Procedure Summary:   Extracorporeal photopheresis was performed on a [employer] device. Peripheral IV was used for access. ACD-A/saline was used to prime the circuit and ACD-A was used during the procedure for anticoagulation.  The patient's vital signs were stable throughout.  The patient tolerated the procedure well without complication.  See apheresis flowsheet for additional details.         Attestation: During the procedure the patient was directly seen and evaluated by me, Tremayne Gillis MD.  I have reviewed the chart and pertinent laboratory findings, and discussed the patient and the current procedure with the Apheresis nursing staff.    Tremayne Gillis MD  Transfusion Medicine Attending  Laboratory Medicine & Pathology

## 2025-06-04 NOTE — DISCHARGE INSTRUCTIONS
Apheresis Blood Donor Center Post Instructions  You may feel tired after your procedure today.   Please call your doctor if you have:  bleeding that doesn t stop, fever, pain where a needle or tube (catheter) was placed, seizures, trouble breathing, red urine, nausea or vomiting, other health concerns.     If your symptoms are severe, call 911.  If your veins were used, keep the bandages on for 2-4 hours.  Avoid heavy lifting with your arms.  If bleeding occurs from these sites, apply firm pressure for 5-10 minutes.  Call your physician if bleeding continues.    The Apheresis/Blood Donor Center is open Monday-Friday 7:30 a.m. to 5 p.m.  The phone number is [phone].  A Transfusion Medicine physician can be reached after 5:00 p.m. weekdays and on weekends /Holidays by calling [phone], and asking for the physician on call.      Photopheresis:  Avoid sunlight , and wear UVA-protective, full coverage sunglasses and sunscreen SPF 15 or higher  for 24 hours after your treatment.  The drug used in your treatment makes patients more sensitive to sunlight for about 24 hours after the treatment.

## 2025-06-09 ENCOUNTER — PATIENT OUTREACH (OUTPATIENT)
Dept: OTOLARYNGOLOGY | Facility: CLINIC | Age: 65
End: 2025-06-09
Payer: COMMERCIAL

## 2025-06-09 NOTE — TELEPHONE ENCOUNTER
LVM for patient to review biopsy results.    Provided direct call back number for patient to return call to discuss further.    Nohemi MALONEN, RN

## 2025-06-29 RX ORDER — CALCIUM CARBONATE 500 MG/1
500 TABLET, CHEWABLE ORAL
Status: CANCELLED | OUTPATIENT
Start: 2025-06-29

## 2025-06-30 ENCOUNTER — HOSPITAL ENCOUNTER (OUTPATIENT)
Dept: LAB | Facility: CLINIC | Age: 65
Discharge: HOME | End: 2025-06-30
Attending: STUDENT IN AN ORGANIZED HEALTH CARE EDUCATION/TRAINING PROGRAM | Admitting: STUDENT IN AN ORGANIZED HEALTH CARE EDUCATION/TRAINING PROGRAM
Payer: COMMERCIAL

## 2025-06-30 ENCOUNTER — OFFICE VISIT (OUTPATIENT)
Dept: RADIATION ONCOLOGY | Facility: CLINIC | Age: 65
End: 2025-06-30
Attending: STUDENT IN AN ORGANIZED HEALTH CARE EDUCATION/TRAINING PROGRAM
Payer: COMMERCIAL

## 2025-06-30 VITALS
WEIGHT: 153 LBS | SYSTOLIC BLOOD PRESSURE: 112 MMHG | HEART RATE: 70 BPM | RESPIRATION RATE: 16 BRPM | TEMPERATURE: 98.6 F | BODY MASS INDEX: 25.46 KG/M2 | DIASTOLIC BLOOD PRESSURE: 53 MMHG

## 2025-06-30 DIAGNOSIS — C84.11: Primary | ICD-10-CM

## 2025-06-30 LAB
BASOPHILS # BLD AUTO: 0.1 10E3/UL (ref 0–0.2)
BASOPHILS NFR BLD AUTO: 2 %
EOSINOPHIL # BLD AUTO: 0.2 10E3/UL (ref 0–0.7)
EOSINOPHIL NFR BLD AUTO: 7 %
ERYTHROCYTE [DISTWIDTH] IN BLOOD BY AUTOMATED COUNT: 12.8 % (ref 10–15)
HCT VFR BLD AUTO: 34.4 % (ref 40–53)
HGB BLD-MCNC: 11.8 G/DL (ref 13.3–17.7)
IMM GRANULOCYTES # BLD: 0 10E3/UL
IMM GRANULOCYTES NFR BLD: 1 %
LYMPHOCYTES # BLD AUTO: 0.2 10E3/UL (ref 0.8–5.3)
LYMPHOCYTES NFR BLD AUTO: 8 %
MCH RBC QN AUTO: 29.4 PG (ref 26.5–33)
MCHC RBC AUTO-ENTMCNC: 34.3 G/DL (ref 31.5–36.5)
MCV RBC AUTO: 86 FL (ref 78–100)
MONOCYTES # BLD AUTO: 0.6 10E3/UL (ref 0–1.3)
MONOCYTES NFR BLD AUTO: 30 %
NEUTROPHILS # BLD AUTO: 1.1 10E3/UL (ref 1.6–8.3)
NEUTROPHILS NFR BLD AUTO: 52 %
NRBC # BLD AUTO: 0 10E3/UL
NRBC BLD AUTO-RTO: 0 /100
PLAT MORPH BLD: NORMAL
PLATELET # BLD AUTO: 251 10E3/UL (ref 150–450)
RBC # BLD AUTO: 4.01 10E6/UL (ref 4.4–5.9)
RBC MORPH BLD: NORMAL
WBC # BLD AUTO: 2.1 10E3/UL (ref 4–11)

## 2025-06-30 PROCEDURE — 36522 PHOTOPHERESIS: CPT

## 2025-06-30 PROCEDURE — 36415 COLL VENOUS BLD VENIPUNCTURE: CPT | Performed by: PATHOLOGY

## 2025-06-30 PROCEDURE — 250N000009 HC RX 250: Performed by: PATHOLOGY

## 2025-06-30 PROCEDURE — 85004 AUTOMATED DIFF WBC COUNT: CPT | Performed by: PATHOLOGY

## 2025-06-30 RX ORDER — CALCIUM CARBONATE 500 MG/1
500 TABLET, CHEWABLE ORAL
Status: CANCELLED | OUTPATIENT
Start: 2025-06-30

## 2025-06-30 RX ADMIN — ANTICOAGULANT CITRATE DEXTROSE SOLUTION FORMULA A 210 ML: 12.25; 11; 3.65 SOLUTION INTRAVENOUS at 13:03

## 2025-06-30 NOTE — PROGRESS NOTES
[facility]  RADIATION ONCOLOGY FOLLOW UP NOTE     NAME: Blane Ugalde  :  1960  DATE OF SERVICE: 2025  MRN: [id number]  REFERRING PHYSICIAN: Ezra Rico   DIAGNOSIS & STAGING: Mycosis Fungoides with Sezary Syndrome, Stage IVA2 (T4N3B1)    Requesting MD: Mr. Ugalde, 64 year old Male with Mycosis Fungoides with Sezary Syndrome, Stage IVA2 (T4N3B1), progressed through multiple treatments including methotrexate, nbUVB, and Mogalizumab. He presented with worsened ulcerating scalp lesions and diffuse erythroderma. He underwent palliative and local control   intent radiotherapy with 1,200 cGy in 8 fractions from 2025 to 2025, and a perineum boost of 700 cGy in 2 fractions from 2025 to 2025 using electron technique. He presents for follow up.    Interval History:   Blane has initiated chemotherapy since finishing treatment and is tolerating it well, but with significant fatigue. He reports that he has experienced significant improvement in his skin redness, skin lesions, and itching since completing RT. He does note some continued eye watering/postnasal drip since treatment. His right neck lymphadenopathy has reduced as well. Denies fevers/chills/weight loss.    All other systems reviewed and are negative.    PAST MEDICAL HISTORY:  No past medical history on file.    SURGICAL HISTORY:  Past Surgical History:   Procedure Laterality Date    APPENDECTOMY      IR LYMPH NODE BIOPSY  2024       FAMILY HISTORY:  No family history on file.  No history of familial malignancies, bleeding disorders, or other syndromes    SOCIAL HISTORY:     Social History     Socioeconomic History    Marital status:      Spouse name: Not on file    Number of children: Not on file    Years of education: Not on file    Highest education level: Not on file   Occupational History    Not on file   Tobacco Use    Smoking status: Never    Smokeless tobacco: Never   Vaping Use    Vaping status: Never  Used   Substance and Sexual Activity    Alcohol use: Not on file    Drug use: Not on file    Sexual activity: Not on file   Other Topics Concern    Not on file   Social History Narrative    Not on file     Social Drivers of Health     Financial Resource Strain: Not on file   Food Insecurity: No Food Insecurity (7/18/2024)    [facility]    Hunger Vital Sign     Worried About Running Out of Food in the Last Year: Never true     Ran Out of Food in the Last Year: Never true   Transportation Needs: No Transportation Needs (7/18/2024)    [facility]    PRAPARE - Transportation     Lack of Transportation (Medical): No     Lack of Transportation (Non-Medical): No   Physical Activity: Sufficiently Active (7/18/2024)    [facility]    Exercise Vital Sign     Days of Exercise per Week: 5 days     Minutes of Exercise per Session: 150+ min   Stress: Not on file   Social Connections: Not on file   Interpersonal Safety: Not At Risk (10/9/2024)    [email or web address]    AHS SDOH: Intimate Partner Violence     AHS IPV - Concerns about your personal safety: No   Housing Stability: Low Risk  (7/18/2024)    [facility]    Housing Stability     What is your living situation today?: I have a steady place to live       MEDICATIONS:    Current Outpatient Medications:     acetaminophen (TYLENOL) 325 MG tablet, Take 650 mg by mouth, Disp: , Rfl:     ciprofloxacin-dexAMETHasone (CIPRODEX) 0.3-0.1 % otic suspension, Place 4 drops Into the left ear 2 times daily., Disp: 10 mL, Rfl: 0    clobetasol (TEMOVATE) 0.05 % external ointment, Apply topically 2 times daily. Apply thin layer to rash on the legs twice daily, Disp: 60 g, Rfl: 4    clobetasol (TEMOVATE) 0.05 % external ointment, Apply topically 2 times daily, Disp: 60 g, Rfl: 3    clobetasol propionate (CLOBEX) 0.05 % external shampoo, , Disp: , Rfl:     ferrous sulfate dried 160 (50 Fe) MG tablet, Take 1 tablet by mouth  daily (with breakfast). 65 mg, Disp: , Rfl:     folic acid (FOLVITE) 1 MG tablet, Take 1 tablet (1 mg) by mouth daily , except on days that you take methotrexate, Disp: 26 tablet, Rfl: 11    hydrOXYzine HCl (ATARAX) 25 MG tablet, Take 1-2 tablets (25-50 mg) by mouth 3 times daily as needed for itching., Disp: 60 tablet, Rfl: 2    methotrexate 2.5 MG tablet, Take 10 tablets (25 mg) by mouth every 7 days . Take the same day of the week., Disp: 40 tablet, Rfl: 5    naltrexone (DEPADE/REVIA) 50 MG tablet, Take 0.5 tablets (25 mg) by mouth daily., Disp: 30 tablet, Rfl: 3    nystatin (MYCOSTATIN) 245934 UNIT/ML suspension, Switch 5ml AND SPIT 3-4 times a day., Disp: 473 mL, Rfl: 1    omeprazole (PRILOSEC) 10 MG DR capsule, Take 20 mg by mouth daily, Disp: , Rfl:     prochlorperazine (COMPAZINE) 10 MG tablet, Take 10 mg by mouth., Disp: , Rfl:     pseudoePHEDrine (SUDAFED) 30 MG tablet, Take 30 mg by mouth, Disp: , Rfl:     tazarotene (TAZORAC) 0.1 % external cream, Apply to areas of thick scaling on hands and feet daily, Disp: 60 g, Rfl: 3    triamcinolone (KENALOG) 0.1 % external cream, Apply twice a day, avoiding skin folds, face, and groin area., Disp: 453.6 g, Rfl: 3    triamcinolone (KENALOG) 0.1 % external cream, APPLY TO AFFECTED AREA 2 TIMES A DAY FOR 10 DAYS TO ARMS, CHEST, NECK, AND BACK, Disp: , Rfl:     ALLERGIES:  No Known Allergies    REVIEW OF SYSTEMS:    As per HPI; additionally the patient denies fevers, chills, or night sweats and denies any cough, chest pain or chest pressure.  No lower extremity swelling. The remainder of the review of systems was reviewed in detail and was negative    PHYSICAL EXAM:  ECO: Fully active  VITALS: There were no vitals taken for this visit.  GENERAL: Alert, well-appearing, in NAD.    SKIN: Erythroderma greatly reduced, no open sores.  HEENT: NCAT, EOMI, minimal palpable right neck lymphadenopathy-                 Resp:  Lungs clear to auscultation bilaterally.    CV:  "Regular rate and rhythm.    GI: Abd soft, non-tender, and non-distended.  No palpable masses.   MSK: No spiny tenderness to palpation.  Extremities:  No pitting edema.   NEURO:     At neurologic baseline    LABORATORY:    Hematology  Recent Labs   Lab Test 06/03/25  1253 05/06/25  1105 04/03/25  1247 03/05/25  0828 02/10/25  1302 01/13/25  1301 12/16/24  1225 11/18/24  1304 10/21/24  1248 09/24/24  1248 08/26/24  1223 07/29/24  1311 07/01/24  1316 05/28/24  1249 05/01/24  1031 04/04/24  1246 03/06/24  1317   WBC 5.2 5.4 5.4 4.7 5.5 6.1 4.6 5.2 4.1 4.5 6.5 5.8 4.6 5.1 5.1 5.3 6.9   HGB 12.9* 14.7 14.7 14.6 14.1 13.4 13.5 13.4 14.4 13.9 13.2* 13.6 14.2 13.7 14.7 14.2 14.8   HCT 39.0* 43.1 43.1 43.8 42.7 41.4 41.5 40.6 43.0 41.2 40.0 40.9 41.3 40.1 43.3 41.7 44.0    214 201 222 207 218 213 214 222 226 281 284 232 242 259 234 278        Coagulation  Recent Labs   Lab Test 07/02/24  1234   INR 0.90       Chemistry  Recent Labs   Lab Test 01/13/25  1301 12/16/24  1225 11/18/24  1304    139 137   CO2 28 26 27   BUN 9.7 10.5 11.2   No results for input(s): \"PHOS\" in the last 20030 hours.    Invalid input(s): \"MG\"  Recent Labs   Lab Test 01/13/25  1301 12/16/24  1225 11/18/24  1304   ALBUMIN 4.0 3.8 3.9   AST 17 21 21   ALT 16 10 16   ALKPHOS 90 95 79         ASSESSMENT AND PLAN:   Requesting MD: Mr. Ugalde, 64 year old Male with Mycosis Fungoides with Sezary Syndrome, Stage IVA2 (T4N3B1), progressed through multiple treatments including methotrexate, nbUVB, and Mogalizumab. He presented with worsened ulcerating scalp lesions and diffuse erythroderma. He underwent palliative and local control   intent radiotherapy with 1,200 cGy in 8 fractions from 5/13/2025 to 5/22/2025, and a perineum boost of 700 cGy in 2 fractions from 5/21/2025 to 5/22/2025 using electron technique. He presents for follow up.    Blane has had an excellent response to total skin electron therapy, symptom-wise, and is recovering well from " radiation therapy. He will continue to follow with medical oncology and dermatology for further care. We will remain available for follow up as needed.    I personally reviewed the available images. Laboratory data and pathology as noted above was reviewed. I reviewed previous medical records, which are summarized in the HPI. A total of 30 minutes were spent (including face to face time) during this visit.    Carlos Kelley M.D.  [facility]     Department of Radiation Oncology    Cc:  Ezra Rico MD  [address] [place] [id number]    Rush Johnson

## 2025-06-30 NOTE — DISCHARGE INSTRUCTIONS
Apheresis Blood Donor Center Post Instructions  You may feel tired after your procedure today.   Please call your doctor if you have:  bleeding that doesn t stop, fever, pain where a needle or tube (catheter) was placed, seizures, trouble breathing, red urine, nausea or vomiting, other health concerns.     If your symptoms are severe, call 911.  If your veins were used, keep the bandages on for 2-4 hours.  Avoid heavy lifting with your arms.  If bleeding occurs from these sites, apply firm pressure for 5-10 minutes.  Call your physician if bleeding continues.      The Apheresis/Blood Donor Center is open Monday-Friday 7:30 a.m. to 5 p.m.  The phone number is [phone].  A Transfusion Medicine physician can be reached after 5:00 p.m. weekdays and on weekends /Holidays by calling [phone], and asking for the physician on call.      Photopheresis:  Avoid sunlight , and wear UVA-protective, full coverage sunglasses and sunscreen SPF 15 or higher  for 24 hours after your treatment.  The drug used in your treatment makes patients more sensitive to sunlight for about 24 hours after the treatment.

## 2025-06-30 NOTE — PROCEDURES
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   He tolerated it well and is on our schedule for tomorrow.  He has had concern about his disease continuing through multiple therapies, and his clinical team is persuing additional therapies.  He has recently received rounds of radiation therapy, a couple of weeks ago.  His skin is noticeably red.   Resting through much of the procedure.                History of Present Illness:   Blane Ugalde is a 64 year old male with past medical history of  hyperlipidemia and a prothrombin gene mutation. He has been followed for rashes and cutaneous symptoms since 2011, but has not had a definitive diagnosis and staging for CTCL or Sézary until a skin biopsy, blood and bone marrow work-up in early 2024 . His first ECP was in March of 2024 ECP and  currently has 2 procedures a month on consecutive days              Past Medical History:   No past medical history on file.          Past Surgical History:     Past Surgical History:   Procedure Laterality Date    APPENDECTOMY      IR LYMPH NODE BIOPSY  7/2/2024               Immunizations:     Immunization History   Administered Date(s) Administered    COVID-19 Monovalent 18+ (Moderna) 03/16/2021, 04/13/2021    Zoster recombinant adjuvanted (Shingrix) 03/15/2024             Allergies:   No Known Allergies          Medications:     Current Outpatient Medications   Medication Sig Dispense Refill    acetaminophen (TYLENOL) 325 MG tablet Take 650 mg by mouth      ciprofloxacin-dexAMETHasone (CIPRODEX) 0.3-0.1 % otic suspension Place 4 drops Into the left ear 2 times daily. 10 mL 0    clobetasol  (TEMOVATE) 0.05 % external ointment Apply topically 2 times daily. Apply thin layer to rash on the legs twice daily 60 g 4    clobetasol (TEMOVATE) 0.05 % external ointment Apply topically 2 times daily 60 g 3    clobetasol propionate (CLOBEX) 0.05 % external shampoo       ferrous sulfate dried 160 (50 Fe) MG tablet Take 1 tablet by mouth daily (with breakfast). 65 mg      folic acid (FOLVITE) 1 MG tablet Take 1 tablet (1 mg) by mouth daily , except on days that you take methotrexate 26 tablet 11    hydrOXYzine HCl (ATARAX) 25 MG tablet Take 1-2 tablets (25-50 mg) by mouth 3 times daily as needed for itching. 60 tablet 2    naltrexone (DEPADE/REVIA) 50 MG tablet Take 0.5 tablets (25 mg) by mouth daily. 30 tablet 3    nystatin (MYCOSTATIN) 824681 UNIT/ML suspension Switch 5ml AND SPIT 3-4 times a day. 473 mL 1    omeprazole (PRILOSEC) 10 MG DR capsule Take 20 mg by mouth daily      prochlorperazine (COMPAZINE) 10 MG tablet Take 10 mg by mouth.      pseudoePHEDrine (SUDAFED) 30 MG tablet Take 30 mg by mouth      tazarotene (TAZORAC) 0.1 % external cream Apply to areas of thick scaling on hands and feet daily 60 g 3    triamcinolone (KENALOG) 0.1 % external cream Apply twice a day, avoiding skin folds, face, and groin area. 453.6 g 3    triamcinolone (KENALOG) 0.1 % external cream APPLY TO AFFECTED AREA 2 TIMES A DAY FOR 10 DAYS TO ARMS, CHEST, NECK, AND BACK      methotrexate 2.5 MG tablet Take 10 tablets (25 mg) by mouth every 7 days . Take the same day of the week. 40 tablet 5     Current Facility-Administered Medications   Medication Dose Route Frequency Provider Last Rate Last Admin    methoxsalen (photopheresis) SOLN   Apheresis DOES NOT GO TO RAFAT Bill, Wilner Escoto MD                 Vital Signs:   Recent vital signs were reviewed            Data:   ROUTINE IP LABS (Last four results)  BMPNo lab results found in last 7 days.  CBC  Recent Labs   Lab 06/30/25  1311   WBC 2.1*   RBC 4.01*   HGB 11.8*   HCT  34.4*   MCV 86   MCH 29.4   MCHC 34.3   RDW 12.8        INRNo lab results found in last 7 days.      PROCEDURE  Extracorporeal photopheresis was performed on a [employer] device. Peripheral IV was used for access. ACD-A/saline was used to prime the circuit and ACD-A was used during the procedure for anticoagulation.  The patient's vital signs were stable throughout.  The patient tolerated the procedure well without complication.  See apheresis flowsheet for additional details.     Attestation: During the procedure the patient was directly seen and evaluated by me, Alexa Alford MD.  I have reviewed the chart and pertinent laboratory findings, and discussed the patient and the current procedure with the Apheresis nursing staff.    Alexa Alford MD  Transfusion Medicine Attending  Laboratory Medicine & Pathology

## 2025-06-30 NOTE — LETTER
2025      Blane Ugalde  [address] [facility] [address]      Dear Colleague,    Thank you for referring your patient, Blane Ugalde, to the [facility] RADIATION ONCOLOGY. Please see a copy of my visit note below.    [facility]  RADIATION ONCOLOGY FOLLOW UP NOTE     NAME: Blane Ugalde  :  1960  DATE OF SERVICE: 2025  MRN: [id number]  REFERRING PHYSICIAN: Ezra Rico   DIAGNOSIS & STAGING: Mycosis Fungoides with Sezary Syndrome, Stage IVA2 (T4N3B1)    Requesting MD: Mr. Ugalde, 64 year old Male with Mycosis Fungoides with Sezary Syndrome, Stage IVA2 (T4N3B1), progressed through multiple treatments including methotrexate, nbUVB, and Mogalizumab. He presented with worsened ulcerating scalp lesions and diffuse erythroderma. He underwent palliative and local control   intent radiotherapy with 1,200 cGy in 8 fractions from 2025 to 2025, and a perineum boost of 700 cGy in 2 fractions from 2025 to 2025 using electron technique. He presents for follow up.    Interval History:   Blane has initiated chemotherapy since finishing treatment and is tolerating it well, but with significant fatigue. He reports that he has experienced significant improvement in his skin redness, skin lesions, and itching since completing RT. He does note some continued eye watering/postnasal drip since treatment. His right neck lymphadenopathy has reduced as well. Denies fevers/chills/weight loss.    All other systems reviewed and are negative.    PAST MEDICAL HISTORY:  No past medical history on file.    SURGICAL HISTORY:  Past Surgical History:   Procedure Laterality Date     APPENDECTOMY       IR LYMPH NODE BIOPSY  2024       FAMILY HISTORY:  No family history on file.  No history of familial malignancies, bleeding disorders, or other syndromes    SOCIAL HISTORY:     Social History     Socioeconomic History     Marital status:      Spouse name: Not on file      Number of children: Not on file     Years of education: Not on file     Highest education level: Not on file   Occupational History     Not on file   Tobacco Use     Smoking status: Never     Smokeless tobacco: Never   Vaping Use     Vaping status: Never Used   Substance and Sexual Activity     Alcohol use: Not on file     Drug use: Not on file     Sexual activity: Not on file   Other Topics Concern     Not on file   Social History Narrative     Not on file     Social Drivers of Health     Financial Resource Strain: Not on file   Food Insecurity: No Food Insecurity (7/18/2024)    Received from [facility]    Hunger Vital Sign      Worried About Running Out of Food in the Last Year: Never true      Ran Out of Food in the Last Year: Never true   Transportation Needs: No Transportation Needs (7/18/2024)    Received from [facility]    PRAPARE - Transportation      Lack of Transportation (Medical): No      Lack of Transportation (Non-Medical): No   Physical Activity: Sufficiently Active (7/18/2024)    Received from [facility]    Exercise Vital Sign      Days of Exercise per Week: 5 days      Minutes of Exercise per Session: 150+ min   Stress: Not on file   Social Connections: Not on file   Interpersonal Safety: Not At Risk (10/9/2024)    Received from  SDOH: Intimate Partner Violence      [facility] IPV - Concerns about your personal safety: No   Housing Stability: Low Risk  (7/18/2024)    Received from [facility]    Housing Stability      What is your living situation today?: I have a steady place to live       MEDICATIONS:    Current Outpatient Medications:      acetaminophen (TYLENOL) 325 MG tablet, Take 650 mg by mouth, Disp: , Rfl:      ciprofloxacin-dexAMETHasone (CIPRODEX) 0.3-0.1 % otic suspension, Place 4 drops Into the left ear 2 times daily., Disp: 10 mL, Rfl: 0     clobetasol (TEMOVATE) 0.05 % external ointment, Apply topically 2 times daily. Apply thin layer to rash on the legs twice  daily, Disp: 60 g, Rfl: 4     clobetasol (TEMOVATE) 0.05 % external ointment, Apply topically 2 times daily, Disp: 60 g, Rfl: 3     clobetasol propionate (CLOBEX) 0.05 % external shampoo, , Disp: , Rfl:      ferrous sulfate dried 160 (50 Fe) MG tablet, Take 1 tablet by mouth daily (with breakfast). 65 mg, Disp: , Rfl:      folic acid (FOLVITE) 1 MG tablet, Take 1 tablet (1 mg) by mouth daily , except on days that you take methotrexate, Disp: 26 tablet, Rfl: 11     hydrOXYzine HCl (ATARAX) 25 MG tablet, Take 1-2 tablets (25-50 mg) by mouth 3 times daily as needed for itching., Disp: 60 tablet, Rfl: 2     methotrexate 2.5 MG tablet, Take 10 tablets (25 mg) by mouth every 7 days . Take the same day of the week., Disp: 40 tablet, Rfl: 5     naltrexone (DEPADE/REVIA) 50 MG tablet, Take 0.5 tablets (25 mg) by mouth daily., Disp: 30 tablet, Rfl: 3     nystatin (MYCOSTATIN) 628825 UNIT/ML suspension, Switch 5ml AND SPIT 3-4 times a day., Disp: 473 mL, Rfl: 1     omeprazole (PRILOSEC) 10 MG DR capsule, Take 20 mg by mouth daily, Disp: , Rfl:      prochlorperazine (COMPAZINE) 10 MG tablet, Take 10 mg by mouth., Disp: , Rfl:      pseudoePHEDrine (SUDAFED) 30 MG tablet, Take 30 mg by mouth, Disp: , Rfl:      tazarotene (TAZORAC) 0.1 % external cream, Apply to areas of thick scaling on hands and feet daily, Disp: 60 g, Rfl: 3     triamcinolone (KENALOG) 0.1 % external cream, Apply twice a day, avoiding skin folds, face, and groin area., Disp: 453.6 g, Rfl: 3     triamcinolone (KENALOG) 0.1 % external cream, APPLY TO AFFECTED AREA 2 TIMES A DAY FOR 10 DAYS TO ARMS, CHEST, NECK, AND BACK, Disp: , Rfl:     ALLERGIES:  No Known Allergies    REVIEW OF SYSTEMS:    As per HPI; additionally the patient denies fevers, chills, or night sweats and denies any cough, chest pain or chest pressure.  No lower extremity swelling. The remainder of the review of systems was reviewed in detail and was negative    PHYSICAL EXAM:  ECO: Fully  "active  VITALS: There were no vitals taken for this visit.  GENERAL: Alert, well-appearing, in NAD.    SKIN: Erythroderma greatly reduced, no open sores.  HEENT: NCAT, EOMI, minimal palpable right neck lymphadenopathy-                 Resp:  Lungs clear to auscultation bilaterally.    CV: Regular rate and rhythm.    GI: Abd soft, non-tender, and non-distended.  No palpable masses.   MSK: No spiny tenderness to palpation.  Extremities:  No pitting edema.   NEURO:     At neurologic baseline    LABORATORY:    Hematology  Recent Labs   Lab Test 06/03/25  1253 05/06/25  1105 04/03/25  1247 03/05/25  0828 02/10/25  1302 01/13/25  1301 12/16/24  1225 11/18/24  1304 10/21/24  1248 09/24/24  1248 08/26/24  1223 07/29/24  1311 07/01/24  1316 05/28/24  1249 05/01/24  1031 04/04/24  1246 03/06/24  1317   WBC 5.2 5.4 5.4 4.7 5.5 6.1 4.6 5.2 4.1 4.5 6.5 5.8 4.6 5.1 5.1 5.3 6.9   HGB 12.9* 14.7 14.7 14.6 14.1 13.4 13.5 13.4 14.4 13.9 13.2* 13.6 14.2 13.7 14.7 14.2 14.8   HCT 39.0* 43.1 43.1 43.8 42.7 41.4 41.5 40.6 43.0 41.2 40.0 40.9 41.3 40.1 43.3 41.7 44.0    214 201 222 207 218 213 214 222 226 281 284 232 242 259 234 278        Coagulation  Recent Labs   Lab Test 07/02/24  1234   INR 0.90       Chemistry  Recent Labs   Lab Test 01/13/25  1301 12/16/24  1225 11/18/24  1304    139 137   CO2 28 26 27   BUN 9.7 10.5 11.2   No results for input(s): \"PHOS\" in the last 01858 hours.    Invalid input(s): \"MG\"  Recent Labs   Lab Test 01/13/25  1301 12/16/24  1225 11/18/24  1304   ALBUMIN 4.0 3.8 3.9   AST 17 21 21   ALT 16 10 16   ALKPHOS 90 95 79         ASSESSMENT AND PLAN:   Requesting MD: Mr. Ugalde, 64 year old Male with Mycosis Fungoides with Sezary Syndrome, Stage IVA2 (T4N3B1), progressed through multiple treatments including methotrexate, nbUVB, and Mogalizumab. He presented with worsened ulcerating scalp lesions and diffuse erythroderma. He underwent palliative and local control   intent radiotherapy with 1,200 " cGy in 8 fractions from 5/13/2025 to 5/22/2025, and a perineum boost of 700 cGy in 2 fractions from 5/21/2025 to 5/22/2025 using electron technique. He presents for follow up.    Blane has had an excellent response to total skin electron therapy, symptom-wise, and is recovering well from radiation therapy. He will continue to follow with medical oncology and dermatology for further care. We will remain available for follow up as needed.    I personally reviewed the available images. Laboratory data and pathology as noted above was reviewed. I reviewed previous medical records, which are summarized in the HPI. A total of 30 minutes were spent (including face to face time) during this visit.    Carlos Kelley M.D.  [facility]     Department of Radiation Oncology    Cc:  Ezra Rico MD  [address] [place] [id number]    Rush Johnson       Again, thank you for allowing me to participate in the care of your patient.        Sincerely,        Carlos Kelley MD    Electronically signed

## 2025-07-01 ENCOUNTER — HOSPITAL ENCOUNTER (OUTPATIENT)
Dept: LAB | Facility: CLINIC | Age: 65
Discharge: HOME | End: 2025-07-01
Attending: STUDENT IN AN ORGANIZED HEALTH CARE EDUCATION/TRAINING PROGRAM | Admitting: STUDENT IN AN ORGANIZED HEALTH CARE EDUCATION/TRAINING PROGRAM
Payer: COMMERCIAL

## 2025-07-01 VITALS
WEIGHT: 152.56 LBS | SYSTOLIC BLOOD PRESSURE: 103 MMHG | BODY MASS INDEX: 25.39 KG/M2 | TEMPERATURE: 99.2 F | DIASTOLIC BLOOD PRESSURE: 53 MMHG | RESPIRATION RATE: 16 BRPM | HEART RATE: 64 BPM

## 2025-07-01 PROCEDURE — 258N000003 HC RX IP 258 OP 636: Performed by: PATHOLOGY

## 2025-07-01 PROCEDURE — 36522 PHOTOPHERESIS: CPT

## 2025-07-01 PROCEDURE — 250N000009 HC RX 250: Performed by: PATHOLOGY

## 2025-07-01 PROCEDURE — 96360 HYDRATION IV INFUSION INIT: CPT | Mod: XU

## 2025-07-01 RX ADMIN — ANTICOAGULANT CITRATE DEXTROSE SOLUTION FORMULA A 219 ML: 12.25; 11; 3.65 SOLUTION INTRAVENOUS at 08:48

## 2025-07-01 RX ADMIN — SODIUM CHLORIDE 500 ML: 0.9 INJECTION, SOLUTION INTRAVENOUS at 11:02

## 2025-07-01 NOTE — PROGRESS NOTES
Gave NS bolus 500 ml via right forearm PIV per order, for hydration, post Photopheresis today. Patient tolerated bolus, VSS , de-accessed PIV, pressure applied x 1 min, no bleeding; used gauze/coban. Patient is discharged , stable, left ambulatory, self.

## 2025-07-01 NOTE — PROCEDURES
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   He tolerated it well.  We gave a 500 ml bolus of saline after the procedure at the patient's request.  He reports feeling better when he receives saline after other procedures at an OSH.  We will look into this practice to better understand if this is indicated.  Patient denies feeling dizzy.    He has had concern about his disease continuing through multiple therapies, and his clinical team is persuing additional therapies.  He has recently received rounds of radiation therapy, a couple of weeks ago.  His skin is noticeably red.   Resting through much of the procedure.                History of Present Illness:   Blane Ugalde is a 64 year old male with past medical history of  hyperlipidemia and a prothrombin gene mutation. He has been followed for rashes and cutaneous symptoms since 2011, but has not had a definitive diagnosis and staging for CTCL or Sézary until a skin biopsy, blood and bone marrow work-up in early 2024 . His first ECP was in March of 2024 ECP and  currently has 2 procedures a month on consecutive days              Past Medical History:   No past medical history on file.          Past Surgical History:     Past Surgical History:   Procedure Laterality Date    APPENDECTOMY      IR LYMPH NODE BIOPSY  7/2/2024               Immunizations:     Immunization History   Administered Date(s) Administered    COVID-19 Monovalent 18+ (Moderna) 03/16/2021, 04/13/2021    Zoster recombinant adjuvanted (Shingrix) 03/15/2024             Allergies:   No Known Allergies          Medications:     Current Outpatient Medications    Medication Sig Dispense Refill    acetaminophen (TYLENOL) 325 MG tablet Take 650 mg by mouth      ciprofloxacin-dexAMETHasone (CIPRODEX) 0.3-0.1 % otic suspension Place 4 drops Into the left ear 2 times daily. 10 mL 0    ferrous sulfate dried 160 (50 Fe) MG tablet Take 1 tablet by mouth daily (with breakfast). 65 mg      hydrOXYzine HCl (ATARAX) 25 MG tablet Take 1-2 tablets (25-50 mg) by mouth 3 times daily as needed for itching. 60 tablet 2    naltrexone (DEPADE/REVIA) 50 MG tablet Take 0.5 tablets (25 mg) by mouth daily. 30 tablet 3    prochlorperazine (COMPAZINE) 10 MG tablet Take 10 mg by mouth.      pseudoePHEDrine (SUDAFED) 30 MG tablet Take 30 mg by mouth      triamcinolone (KENALOG) 0.1 % external cream Apply twice a day, avoiding skin folds, face, and groin area. 453.6 g 3    triamcinolone (KENALOG) 0.1 % external cream APPLY TO AFFECTED AREA 2 TIMES A DAY FOR 10 DAYS TO ARMS, CHEST, NECK, AND BACK      clobetasol (TEMOVATE) 0.05 % external ointment Apply topically 2 times daily. Apply thin layer to rash on the legs twice daily 60 g 4    clobetasol (TEMOVATE) 0.05 % external ointment Apply topically 2 times daily 60 g 3    clobetasol propionate (CLOBEX) 0.05 % external shampoo       folic acid (FOLVITE) 1 MG tablet Take 1 tablet (1 mg) by mouth daily , except on days that you take methotrexate 26 tablet 11    methotrexate 2.5 MG tablet Take 10 tablets (25 mg) by mouth every 7 days . Take the same day of the week. 40 tablet 5    nystatin (MYCOSTATIN) 066453 UNIT/ML suspension Switch 5ml AND SPIT 3-4 times a day. 473 mL 1    omeprazole (PRILOSEC) 10 MG DR capsule Take 20 mg by mouth daily      tazarotene (TAZORAC) 0.1 % external cream Apply to areas of thick scaling on hands and feet daily 60 g 3     Current Facility-Administered Medications   Medication Dose Route Frequency Provider Last Rate Last Admin    Heparinized Saline to be used in Apheresis Center for Prime  10 mL Apheresis During Apheresis (from  stock) Alexa Alford MD        methoxsalen (photopheresis) SOLN   Apheresis DOES NOT GO TO Alexa Marinelli MD                 Vital Signs:   Recent vital signs were reviewed            Data:   ROUTINE IP LABS (Last four results)  BMPNo lab results found in last 7 days.  CBC  Recent Labs   Lab 06/30/25  1311   WBC 2.1*   RBC 4.01*   HGB 11.8*   HCT 34.4*   MCV 86   MCH 29.4   MCHC 34.3   RDW 12.8        INRNo lab results found in last 7 days.      PROCEDURE  Extracorporeal photopheresis was performed on a [employer] [facility] device. Peripheral IV was used for access. ACD-A/saline was used to prime the circuit and ACD-A was used during the procedure for anticoagulation.  The patient's vital signs were stable throughout.  The patient tolerated the procedure well without complication.  See apheresis flowsheet for additional details.     Attestation: During the procedure the patient was directly seen and evaluated by me, Alexa Alford MD.  I have reviewed the chart and pertinent laboratory findings, and discussed the patient and the current procedure with the Apheresis nursing staff.    Alexa Alford MD  Transfusion Medicine Attending  Laboratory Medicine & Pathology

## 2025-07-23 RX ORDER — CALCIUM CARBONATE 500 MG/1
500 TABLET, CHEWABLE ORAL
Status: CANCELLED | OUTPATIENT
Start: 2025-07-23

## 2025-07-24 ENCOUNTER — HOSPITAL ENCOUNTER (OUTPATIENT)
Dept: LAB | Facility: CLINIC | Age: 65
End: 2025-07-24
Attending: STUDENT IN AN ORGANIZED HEALTH CARE EDUCATION/TRAINING PROGRAM
Payer: COMMERCIAL

## 2025-07-24 VITALS
WEIGHT: 152.56 LBS | SYSTOLIC BLOOD PRESSURE: 111 MMHG | RESPIRATION RATE: 16 BRPM | BODY MASS INDEX: 25.39 KG/M2 | TEMPERATURE: 97.9 F | HEART RATE: 65 BPM | DIASTOLIC BLOOD PRESSURE: 63 MMHG

## 2025-07-24 LAB
BASOPHILS # BLD AUTO: 0 10E3/UL (ref 0–0.2)
BASOPHILS NFR BLD AUTO: 2 %
EOSINOPHIL # BLD AUTO: 0 10E3/UL (ref 0–0.7)
EOSINOPHIL NFR BLD AUTO: 1 %
ERYTHROCYTE [DISTWIDTH] IN BLOOD BY AUTOMATED COUNT: 13.9 % (ref 10–15)
HCT VFR BLD AUTO: 36.7 % (ref 40–53)
HGB BLD-MCNC: 13.1 G/DL (ref 13.3–17.7)
IMM GRANULOCYTES # BLD: 0 10E3/UL
IMM GRANULOCYTES NFR BLD: 1 %
LYMPHOCYTES # BLD AUTO: 0.3 10E3/UL (ref 0.8–5.3)
LYMPHOCYTES NFR BLD AUTO: 13 %
MCH RBC QN AUTO: 30.8 PG (ref 26.5–33)
MCHC RBC AUTO-ENTMCNC: 35.7 G/DL (ref 31.5–36.5)
MCV RBC AUTO: 86 FL (ref 78–100)
MONOCYTES # BLD AUTO: 0.6 10E3/UL (ref 0–1.3)
MONOCYTES NFR BLD AUTO: 31 %
NEUTROPHILS # BLD AUTO: 1 10E3/UL (ref 1.6–8.3)
NEUTROPHILS NFR BLD AUTO: 53 %
NRBC # BLD AUTO: 0 10E3/UL
NRBC BLD AUTO-RTO: 0 /100
PLATELET # BLD AUTO: 233 10E3/UL (ref 150–450)
RBC # BLD AUTO: 4.25 10E6/UL (ref 4.4–5.9)
WBC # BLD AUTO: 1.9 10E3/UL (ref 4–11)

## 2025-07-24 PROCEDURE — 85004 AUTOMATED DIFF WBC COUNT: CPT | Performed by: PATHOLOGY

## 2025-07-24 PROCEDURE — 36415 COLL VENOUS BLD VENIPUNCTURE: CPT | Performed by: PATHOLOGY

## 2025-07-24 PROCEDURE — 36522 PHOTOPHERESIS: CPT

## 2025-07-24 PROCEDURE — 96360 HYDRATION IV INFUSION INIT: CPT

## 2025-07-24 PROCEDURE — 250N000009 HC RX 250: Performed by: PATHOLOGY

## 2025-07-24 PROCEDURE — 258N000003 HC RX IP 258 OP 636: Performed by: PATHOLOGY

## 2025-07-24 RX ADMIN — ANTICOAGULANT CITRATE DEXTROSE SOLUTION FORMULA A 213 ML: 12.25; 11; 3.65 SOLUTION INTRAVENOUS at 12:30

## 2025-07-24 RX ADMIN — SODIUM CHLORIDE 500 ML: 0.9 INJECTION, SOLUTION INTRAVENOUS at 15:01

## 2025-07-24 NOTE — PROCEDURES
Laboratory Medicine and Pathology  Transfusion Medicine - Apheresis Procedure    Blane Ugalde MRN# [id number]   YOB: 1960 Age: 64 year old        Reason for consult: Mycosis fungoides/Sezary syndrome           Assessment and Plan:   The patient is a 64 year old male with mycosis fungoides/Sezary syndrome. He underwent extracorporeal photopheresis (ECP). He tolerated the procedure well. He was given a scheduled normal saline bolus at the end as he feels he tolerates the procedures better if he is hydrated.         Chief Complaint:   Rash         History of Present Illness:   The patient is a 64 year old male with mycosis fungoides/Sezary syndrome. He has symptoms for many years before being diagnosed in 1/2024.  He is currently on brentuximab, bendamustine  and ECP. He has also received methotrexate, mogamulizumab, and total skin electron beam therapy in the past.   He reports skin is relatively stable. No areas of skin breakdown. He does feel constipated and has some related stomach pain. He also has leg pain, which is more in both thighs. He does not drink enough fluids and has not gained weight. No recent fever or chills.         Past Medical History:   Mycosis fungoides/Sezary syndrome  Right shoulder injury  Esophagitis  Gastritis  GERD  Hemorrhoids  Factor V Leiden          Past Surgical History:   Appendectomy  Lymph node biopsy  Skin biopsy  Rotator cuff         Social History:   , works in construction          Family History:   Not reviewed today          Immunizations:   Not reviewed otday          Allergies:   No Known Allergies          Medications:     Current Outpatient Medications   Medication Sig    acetaminophen (TYLENOL) 325 MG tablet Take 650 mg by mouth    clobetasol (TEMOVATE) 0.05 % external ointment Apply topically 2 times daily. Apply thin layer to rash on the legs twice daily    clobetasol (TEMOVATE) 0.05 % external ointment Apply topically 2 times  daily    clobetasol propionate (CLOBEX) 0.05 % external shampoo     ferrous sulfate dried 160 (50 Fe) MG tablet Take 1 tablet by mouth daily (with breakfast). 65 mg    folic acid (FOLVITE) 1 MG tablet Take 1 tablet (1 mg) by mouth daily , except on days that you take methotrexate    hydrOXYzine HCl (ATARAX) 25 MG tablet Take 1-2 tablets (25-50 mg) by mouth 3 times daily as needed for itching.    naltrexone (DEPADE/REVIA) 50 MG tablet Take 0.5 tablets (25 mg) by mouth daily.    nystatin (MYCOSTATIN) 264473 UNIT/ML suspension Switch 5ml AND SPIT 3-4 times a day.    omeprazole (PRILOSEC) 10 MG DR capsule Take 20 mg by mouth daily    prochlorperazine (COMPAZINE) 10 MG tablet Take 10 mg by mouth.    pseudoePHEDrine (SUDAFED) 30 MG tablet Take 30 mg by mouth    tazarotene (TAZORAC) 0.1 % external cream Apply to areas of thick scaling on hands and feet daily    triamcinolone (KENALOG) 0.1 % external cream Apply twice a day, avoiding skin folds, face, and groin area.    triamcinolone (KENALOG) 0.1 % external cream APPLY TO AFFECTED AREA 2 TIMES A DAY FOR 10 DAYS TO ARMS, CHEST, NECK, AND BACK    ciprofloxacin-dexAMETHasone (CIPRODEX) 0.3-0.1 % otic suspension Place 4 drops Into the left ear 2 times daily.    methotrexate 2.5 MG tablet Take 10 tablets (25 mg) by mouth every 7 days . Take the same day of the week.           Review of Systems:   Denied fever, chills, cough, shortness of breath, nausea, vomiting, diarrhea  See also above         Exam:   /79 P 73 T 98 RR 16 O2 sat 97%  Alert, no apparent distress  Breathing appears comfortable on room air  No jaundice or scleral icterus  Moves extremities  Erythema of visible skin, some patchy darkening  PIV          Data:     CBC with platelets and differential   Result Value Ref Range    WBC Count 1.9 (L) 4.0 - 11.0 10e3/uL    RBC Count 4.25 (L) 4.40 - 5.90 10e6/uL    Hemoglobin 13.1 (L) 13.3 - 17.7 g/dL    Hematocrit 36.7 (L) 40.0 - 53.0 %    MCV 86 78 - 100 fL    MCH  30.8 26.5 - 33.0 pg    MCHC 35.7 31.5 - 36.5 g/dL    RDW 13.9 10.0 - 15.0 %    Platelet Count 233 150 - 450 10e3/uL    % Neutrophils 53 %    % Lymphocytes 13 %    % Monocytes 31 %    % Eosinophils 1 %    % Basophils 2 %    % Immature Granulocytes 1 %    NRBCs per 100 WBC 0 <1 /100    Absolute Neutrophils 1.0 (L) 1.6 - 8.3 10e3/uL    Absolute Lymphocytes 0.3 (L) 0.8 - 5.3 10e3/uL    Absolute Monocytes 0.6 0.0 - 1.3 10e3/uL    Absolute Eosinophils 0.0 0.0 - 0.7 10e3/uL    Absolute Basophils 0.0 0.0 - 0.2 10e3/uL    Absolute Immature Granulocytes 0.0 <=0.4 10e3/uL    Absolute NRBCs 0.0 10e3/uL          Procedure Summary:   Extracorporeal photopheresis (ECP) was performed using a [employer] device.  The vascular access was peripheral IV.  The circuit was primed with ACD-A.  ACD-A was also used for anticoagulation during the procedure. The IV infiltrated and a new peripheral IV had to be placed. The collected white blood cells were treated with Uvadex (methoxsalen) and then exposed to UVA irradiation in the device.  The treated cells were returned to the patient. A 500 mL normal saline bolus was given after the procedure. Vital signs were stable. The patient tolerated the procedure well.     Attestation: During the procedure the patient was directly seen and evaluated by me, Rona Brown MD, PhD.  I have reviewed the chart and pertinent laboratory findings, and discussed the patient and the current procedure with the Apheresis nursing staff.    Rona Brown MD, PhD  Transfusion Medicine Attending  Laboratory Medicine & Pathology

## 2025-07-24 NOTE — DISCHARGE INSTRUCTIONS
Apheresis Blood Donor Center Post Instructions  You may feel tired after your procedure today.   Please call your doctor if you have:  bleeding that doesn t stop, fever, pain where a needle or tube (catheter) was placed, seizures, trouble breathing, red urine, nausea or vomiting, other health concerns.     If your symptoms are severe, call 911.  If your veins were used, keep the bandages on for 2-4 hours.  Avoid heavy lifting with your arms.  If bleeding occurs from these sites, apply firm pressure for 5-10 minutes.  Call your physician if bleeding continues.      The Apheresis/Blood Donor Center is open Monday-Friday 7:30 a.m. to 5 p.m.  The phone number is [phone].  A Transfusion Medicine physician can be reached after 5:00 p.m. weekdays and on weekends /Holidays by calling [phone], and asking for the physician on call.      Photopheresis:  Avoid sunlight , and wear UVA-protective, full coverage sunglasses and sunscreen SPF 15 or higher  for 24 hours after your treatment.  The drug used in your treatment makes patients more sensitive to sunlight for about 24 hours after the treatment.

## 2025-07-25 ENCOUNTER — HOSPITAL ENCOUNTER (OUTPATIENT)
Dept: LAB | Facility: CLINIC | Age: 65
Discharge: HOME | End: 2025-07-25
Attending: STUDENT IN AN ORGANIZED HEALTH CARE EDUCATION/TRAINING PROGRAM | Admitting: STUDENT IN AN ORGANIZED HEALTH CARE EDUCATION/TRAINING PROGRAM
Payer: COMMERCIAL

## 2025-07-25 VITALS
SYSTOLIC BLOOD PRESSURE: 111 MMHG | RESPIRATION RATE: 16 BRPM | TEMPERATURE: 98.4 F | DIASTOLIC BLOOD PRESSURE: 71 MMHG | HEART RATE: 72 BPM

## 2025-07-25 PROCEDURE — 258N000003 HC RX IP 258 OP 636: Performed by: PATHOLOGY

## 2025-07-25 PROCEDURE — 36522 PHOTOPHERESIS: CPT

## 2025-07-25 PROCEDURE — 96360 HYDRATION IV INFUSION INIT: CPT | Mod: XU

## 2025-07-25 PROCEDURE — 250N000009 HC RX 250: Performed by: PATHOLOGY

## 2025-07-25 RX ORDER — CALCIUM CARBONATE 500 MG/1
500 TABLET, CHEWABLE ORAL
Status: CANCELLED | OUTPATIENT
Start: 2025-07-25

## 2025-07-25 RX ADMIN — ANTICOAGULANT CITRATE DEXTROSE SOLUTION FORMULA A 215 ML: 12.25; 11; 3.65 SOLUTION INTRAVENOUS at 09:14

## 2025-07-25 RX ADMIN — SODIUM CHLORIDE 500 ML: 9 INJECTION, SOLUTION INTRAVENOUS at 11:16

## 2025-07-25 NOTE — PROCEDURES
Laboratory Medicine and Pathology  Transfusion Medicine - Apheresis Procedure    Blane Ugalde MRN# [id number]   YOB: 1960 Age: 64 year old        Reason for consult: Mycosis fungoides/Sezary syndrome           Assessment and Plan:   The patient is a 64 year old male with mycosis fungoides/Sezary syndrome. He underwent extracorporeal photopheresis (ECP). He tolerated the procedure well. He was given a scheduled normal saline bolus at the end as he feels he tolerates the procedures better if he is hydrated.          Chief Complaint:   Constipation         History of Present Illness:   The patient is a 64 year old male with mycosis fungoides/Sezary syndrome. He has symptoms for many years before being diagnosed in 1/2024. He is currently on brentuximab, bendamustine and ECP. He has also received methotrexate, mogamulizumab, and total skin electron beam therapy in the past. He tolerated ECP yesterday. [place] well overnight, but is continuing to have some constipation and abdominal cramping. He did have a bowel movement, no blood stools.  He does continue to have bilateral leg pain.  His skin symptoms are unchanged. He does not have a much pruritus. His skin has been darkening since he received the electron beam therapy.         Past Medical History:   Mycosis fungoides/Sezary syndrome  Right shoulder injury  Esophagitis  Gastritis  GERD  Hemorrhoids  Factor V Leiden         Past Surgical History:   Appendectomy  Lymph node biopsy  Skin biopsy  Rotator cuff         Social History:   , works in construction          Family History:   Not reviewed today          Immunizations:   Not reviewed today          Allergies:   No Known Allergies          Medications:     Current Outpatient Medications   Medication Sig    acetaminophen (TYLENOL) 325 MG tablet Take 650 mg by mouth    ciprofloxacin-dexAMETHasone (CIPRODEX) 0.3-0.1 % otic suspension Place 4 drops Into the left ear 2 times daily.     clobetasol (TEMOVATE) 0.05 % external ointment Apply topically 2 times daily. Apply thin layer to rash on the legs twice daily    clobetasol (TEMOVATE) 0.05 % external ointment Apply topically 2 times daily    clobetasol propionate (CLOBEX) 0.05 % external shampoo     ferrous sulfate dried 160 (50 Fe) MG tablet Take 1 tablet by mouth daily (with breakfast). 65 mg    folic acid (FOLVITE) 1 MG tablet Take 1 tablet (1 mg) by mouth daily , except on days that you take methotrexate    hydrOXYzine HCl (ATARAX) 25 MG tablet Take 1-2 tablets (25-50 mg) by mouth 3 times daily as needed for itching.    methotrexate 2.5 MG tablet Take 10 tablets (25 mg) by mouth every 7 days . Take the same day of the week.    naltrexone (DEPADE/REVIA) 50 MG tablet Take 0.5 tablets (25 mg) by mouth daily.    nystatin (MYCOSTATIN) 593136 UNIT/ML suspension Switch 5ml AND SPIT 3-4 times a day.    omeprazole (PRILOSEC) 10 MG DR capsule Take 20 mg by mouth daily    prochlorperazine (COMPAZINE) 10 MG tablet Take 10 mg by mouth.    pseudoePHEDrine (SUDAFED) 30 MG tablet Take 30 mg by mouth    tazarotene (TAZORAC) 0.1 % external cream Apply to areas of thick scaling on hands and feet daily    triamcinolone (KENALOG) 0.1 % external cream Apply twice a day, avoiding skin folds, face, and groin area.    triamcinolone (KENALOG) 0.1 % external cream APPLY TO AFFECTED AREA 2 TIMES A DAY FOR 10 DAYS TO ARMS, CHEST, NECK, AND BACK          Review of Systems:   +tired  Denied fever, chills, cough, shortness of breath, nausea, vomiting, diarrhea  See also above         Exam:   /71 P 72 T 98.4 RR 16 O2 sat 95%  Alert, no apparent distress  Breathing appears comfortable on room air  No jaundice or scleral icterus  Moves all extremities  Erythema of visible skin, areas of darker skin on parts of arms  PIV          Data:   No labs today         Procedure Summary:   Extracorporeal photopheresis (ECP) was performed using a Cellex device.  The vascular access  was peripheral IV.  The circuit was primed with ACD-A.  ACD-A was also used for anticoagulation during the procedure.  The collected white blood cells were treated with Uvadex (methoxsalen) and then exposed to UVA irradiation in the device.  The treated cells were returned to the patient. He was given a 500 mL normal saline bolus at the end of the procedure. Vital signs were stable. The patient tolerated the procedure well.     Attestation: During the procedure the patient was directly seen and evaluated by me, Rona Brown MD, PhD  I have reviewed the chart and pertinent laboratory findings, and discussed the patient and the current procedure with the Apheresis nursing staff.    Rona Brown MD, PhD  Transfusion Medicine Attending  Laboratory Medicine & Pathology

## 2025-08-20 RX ORDER — CALCIUM CARBONATE 500 MG/1
500 TABLET, CHEWABLE ORAL
Status: CANCELLED | OUTPATIENT
Start: 2025-08-20

## 2025-08-21 ENCOUNTER — PATIENT OUTREACH (OUTPATIENT)
Dept: CARE COORDINATION | Facility: CLINIC | Age: 65
End: 2025-08-21

## 2025-08-21 ENCOUNTER — HOSPITAL ENCOUNTER (OUTPATIENT)
Dept: LAB | Facility: CLINIC | Age: 65
End: 2025-08-21
Attending: STUDENT IN AN ORGANIZED HEALTH CARE EDUCATION/TRAINING PROGRAM
Payer: COMMERCIAL

## 2025-08-21 VITALS
RESPIRATION RATE: 16 BRPM | DIASTOLIC BLOOD PRESSURE: 59 MMHG | HEART RATE: 48 BPM | SYSTOLIC BLOOD PRESSURE: 106 MMHG | TEMPERATURE: 98.4 F

## 2025-08-21 DIAGNOSIS — C84.04 MYCOSIS FUNGOIDES INVOLVING LYMPH NODES OF AXILLA (H): Primary | ICD-10-CM

## 2025-08-21 LAB
BASOPHILS # BLD AUTO: <0.03 10E3/UL (ref 0–0.2)
BASOPHILS NFR BLD AUTO: 0.4 %
EOSINOPHIL # BLD AUTO: <0.03 10E3/UL (ref 0–0.7)
EOSINOPHIL NFR BLD AUTO: 0 %
ERYTHROCYTE [DISTWIDTH] IN BLOOD BY AUTOMATED COUNT: 15.5 % (ref 10–15)
HCT VFR BLD AUTO: 34.9 % (ref 40–53)
HGB BLD-MCNC: 12.1 G/DL (ref 13.3–17.7)
IMM GRANULOCYTES # BLD: 0.03 10E3/UL
IMM GRANULOCYTES NFR BLD: 0.5 %
LYMPHOCYTES # BLD AUTO: 0.06 10E3/UL (ref 0.8–5.3)
LYMPHOCYTES NFR BLD AUTO: 1.1 %
MCH RBC QN AUTO: 30.4 PG (ref 26.5–33)
MCHC RBC AUTO-ENTMCNC: 34.7 G/DL (ref 31.5–36.5)
MCV RBC AUTO: 87.7 FL (ref 78–100)
MONOCYTES # BLD AUTO: 0.67 10E3/UL (ref 0–1.3)
MONOCYTES NFR BLD AUTO: 11.9 %
NEUTROPHILS # BLD AUTO: 4.83 10E3/UL (ref 1.6–8.3)
NEUTROPHILS NFR BLD AUTO: 86.1 %
NRBC # BLD AUTO: <0.03 10E3/UL
NRBC BLD AUTO-RTO: 0 /100
PLATELET # BLD AUTO: 190 10E3/UL (ref 150–450)
RBC # BLD AUTO: 3.98 10E6/UL (ref 4.4–5.9)
WBC # BLD AUTO: 5.61 10E3/UL (ref 4–11)

## 2025-08-21 PROCEDURE — 250N000009 HC RX 250

## 2025-08-21 PROCEDURE — 36522 PHOTOPHERESIS: CPT

## 2025-08-21 PROCEDURE — 36415 COLL VENOUS BLD VENIPUNCTURE: CPT

## 2025-08-21 PROCEDURE — 85004 AUTOMATED DIFF WBC COUNT: CPT

## 2025-08-21 RX ORDER — CALCIUM CARBONATE 500 MG/1
500 TABLET, CHEWABLE ORAL
Status: CANCELLED | OUTPATIENT
Start: 2025-08-21

## 2025-08-21 RX ADMIN — ANTICOAGULANT CITRATE DEXTROSE SOLUTION FORMULA A 221 ML: 12.25; 11; 3.65 SOLUTION INTRAVENOUS at 14:43

## 2025-08-22 ENCOUNTER — HOSPITAL ENCOUNTER (OUTPATIENT)
Dept: LAB | Facility: CLINIC | Age: 65
Discharge: HOME | End: 2025-08-22
Attending: STUDENT IN AN ORGANIZED HEALTH CARE EDUCATION/TRAINING PROGRAM | Admitting: STUDENT IN AN ORGANIZED HEALTH CARE EDUCATION/TRAINING PROGRAM
Payer: COMMERCIAL

## 2025-08-22 VITALS
TEMPERATURE: 98.1 F | RESPIRATION RATE: 20 BRPM | DIASTOLIC BLOOD PRESSURE: 70 MMHG | WEIGHT: 148.15 LBS | SYSTOLIC BLOOD PRESSURE: 118 MMHG | BODY MASS INDEX: 24.65 KG/M2 | HEART RATE: 52 BPM

## 2025-08-22 PROCEDURE — 36522 PHOTOPHERESIS: CPT

## 2025-08-22 PROCEDURE — 258N000003 HC RX IP 258 OP 636

## 2025-08-22 PROCEDURE — 250N000009 HC RX 250

## 2025-08-22 RX ADMIN — ANTICOAGULANT CITRATE DEXTROSE SOLUTION FORMULA A 209 ML: 12.25; 11; 3.65 SOLUTION INTRAVENOUS at 08:31

## 2025-08-22 RX ADMIN — SODIUM CHLORIDE 500 ML: 0.9 INJECTION, SOLUTION INTRAVENOUS at 10:51

## 2025-09-02 ENCOUNTER — PATIENT OUTREACH (OUTPATIENT)
Dept: ONCOLOGY | Facility: CLINIC | Age: 65
End: 2025-09-02
Payer: COMMERCIAL

## 2025-09-03 ENCOUNTER — VIRTUAL VISIT (OUTPATIENT)
Dept: ONCOLOGY | Facility: CLINIC | Age: 65
End: 2025-09-03
Attending: INTERNAL MEDICINE
Payer: COMMERCIAL

## 2025-09-03 VITALS — BODY MASS INDEX: 24.99 KG/M2 | WEIGHT: 150 LBS | HEIGHT: 65 IN

## 2025-09-03 DIAGNOSIS — C84.09 MYCOSIS FUNGOIDES INVOLVING EXTRANODAL SITE EXCLUDING SPLEEN AND OTHER SOLID ORGANS (H): Primary | ICD-10-CM

## 2025-09-03 DIAGNOSIS — C84.18 SEZARY DISEASE INVOLVING LYMPH NODES OF MULTIPLE REGIONS (H): ICD-10-CM

## 2025-09-03 DIAGNOSIS — C84.04 MYCOSIS FUNGOIDES INVOLVING LYMPH NODES OF AXILLA (H): ICD-10-CM

## 2025-09-03 PROCEDURE — 98006 SYNCH AUDIO-VIDEO EST MOD 30: CPT | Performed by: INTERNAL MEDICINE

## 2025-09-03 PROCEDURE — 1126F AMNT PAIN NOTED NONE PRSNT: CPT | Performed by: INTERNAL MEDICINE

## 2025-09-03 PROCEDURE — G2211 COMPLEX E/M VISIT ADD ON: HCPCS | Performed by: INTERNAL MEDICINE

## 2025-09-03 ASSESSMENT — PATIENT HEALTH QUESTIONNAIRE - PHQ9: SUM OF ALL RESPONSES TO PHQ QUESTIONS 1-9: 8

## 2025-09-03 ASSESSMENT — PAIN SCALES - GENERAL: PAINLEVEL_OUTOF10: NO PAIN (0)

## (undated) RX ORDER — HEPARIN SODIUM 1000 [USP'U]/ML
INJECTION, SOLUTION INTRAVENOUS; SUBCUTANEOUS
Status: DISPENSED
Start: 2024-08-27

## (undated) RX ORDER — HEPARIN SODIUM 1000 [USP'U]/ML
INJECTION, SOLUTION INTRAVENOUS; SUBCUTANEOUS
Status: DISPENSED
Start: 2024-08-26

## (undated) RX ORDER — HEPARIN SODIUM 1000 [USP'U]/ML
INJECTION, SOLUTION INTRAVENOUS; SUBCUTANEOUS
Status: DISPENSED
Start: 2024-05-29

## (undated) RX ORDER — HEPARIN SODIUM 1000 [USP'U]/ML
INJECTION, SOLUTION INTRAVENOUS; SUBCUTANEOUS
Status: DISPENSED
Start: 2024-04-05

## (undated) RX ORDER — HEPARIN SODIUM 1000 [USP'U]/ML
INJECTION, SOLUTION INTRAVENOUS; SUBCUTANEOUS
Status: DISPENSED
Start: 2024-04-04

## (undated) RX ORDER — HEPARIN SODIUM 1000 [USP'U]/ML
INJECTION, SOLUTION INTRAVENOUS; SUBCUTANEOUS
Status: DISPENSED
Start: 2024-09-25

## (undated) RX ORDER — HEPARIN SODIUM 1000 [USP'U]/ML
INJECTION, SOLUTION INTRAVENOUS; SUBCUTANEOUS
Status: DISPENSED
Start: 2024-07-30

## (undated) RX ORDER — HEPARIN SODIUM 1000 [USP'U]/ML
INJECTION, SOLUTION INTRAVENOUS; SUBCUTANEOUS
Status: DISPENSED
Start: 2024-09-24

## (undated) RX ORDER — HEPARIN SODIUM 1000 [USP'U]/ML
INJECTION, SOLUTION INTRAVENOUS; SUBCUTANEOUS
Status: DISPENSED
Start: 2024-07-29

## (undated) RX ORDER — HEPARIN SODIUM 1000 [USP'U]/ML
INJECTION, SOLUTION INTRAVENOUS; SUBCUTANEOUS
Status: DISPENSED
Start: 2024-05-01

## (undated) RX ORDER — SODIUM CHLORIDE 9 MG/ML
INJECTION, SOLUTION INTRAVENOUS
Status: DISPENSED
Start: 2024-07-02

## (undated) RX ORDER — HEPARIN SODIUM 1000 [USP'U]/ML
INJECTION, SOLUTION INTRAVENOUS; SUBCUTANEOUS
Status: DISPENSED
Start: 2024-05-28

## (undated) RX ORDER — HEPARIN SODIUM 1000 [USP'U]/ML
INJECTION, SOLUTION INTRAVENOUS; SUBCUTANEOUS
Status: DISPENSED
Start: 2024-03-07

## (undated) RX ORDER — HEPARIN SODIUM 1000 [USP'U]/ML
INJECTION, SOLUTION INTRAVENOUS; SUBCUTANEOUS
Status: DISPENSED
Start: 2024-05-02

## (undated) RX ORDER — LIDOCAINE HYDROCHLORIDE 10 MG/ML
INJECTION, SOLUTION EPIDURAL; INFILTRATION; INTRACAUDAL; PERINEURAL
Status: DISPENSED
Start: 2024-07-02

## (undated) RX ORDER — HEPARIN SODIUM 1000 [USP'U]/ML
INJECTION, SOLUTION INTRAVENOUS; SUBCUTANEOUS
Status: DISPENSED
Start: 2024-03-06

## (undated) RX ORDER — HEPARIN SODIUM 1000 [USP'U]/ML
INJECTION, SOLUTION INTRAVENOUS; SUBCUTANEOUS
Status: DISPENSED
Start: 2024-07-01

## (undated) RX ORDER — FENTANYL CITRATE 50 UG/ML
INJECTION, SOLUTION INTRAMUSCULAR; INTRAVENOUS
Status: DISPENSED
Start: 2024-07-02